# Patient Record
Sex: MALE | Race: WHITE | NOT HISPANIC OR LATINO | Employment: OTHER | URBAN - METROPOLITAN AREA
[De-identification: names, ages, dates, MRNs, and addresses within clinical notes are randomized per-mention and may not be internally consistent; named-entity substitution may affect disease eponyms.]

---

## 2017-04-25 ENCOUNTER — COMMUNICATION - HEALTHEAST (OUTPATIENT)
Dept: FAMILY MEDICINE | Facility: CLINIC | Age: 82
End: 2017-04-25

## 2017-05-10 ENCOUNTER — HOSPITAL ENCOUNTER (OUTPATIENT)
Dept: NEUROLOGY | Facility: CLINIC | Age: 82
Setting detail: THERAPIES SERIES
Discharge: STILL ADMITTED OR ADMINISTRATIVE | End: 2017-05-10
Attending: NURSE PRACTITIONER

## 2017-05-10 DIAGNOSIS — R06.02 SHORTNESS OF BREATH: ICD-10-CM

## 2017-05-11 ENCOUNTER — COMMUNICATION - HEALTHEAST (OUTPATIENT)
Dept: NEUROSURGERY | Facility: CLINIC | Age: 82
End: 2017-05-11

## 2017-05-11 ENCOUNTER — RECORDS - HEALTHEAST (OUTPATIENT)
Dept: ADMINISTRATIVE | Facility: OTHER | Age: 82
End: 2017-05-11

## 2017-05-18 ENCOUNTER — HOSPITAL ENCOUNTER (OUTPATIENT)
Dept: NEUROLOGY | Facility: CLINIC | Age: 82
Setting detail: THERAPIES SERIES
Discharge: STILL ADMITTED OR ADMINISTRATIVE | End: 2017-05-18
Attending: NURSE PRACTITIONER

## 2017-05-18 ENCOUNTER — HOSPITAL ENCOUNTER (OUTPATIENT)
Dept: CT IMAGING | Facility: CLINIC | Age: 82
Discharge: HOME | End: 2017-05-18
Attending: NURSE PRACTITIONER

## 2017-05-18 DIAGNOSIS — R29.818 PARKINSONIAN FEATURES: ICD-10-CM

## 2017-05-18 DIAGNOSIS — Z91.09 ENVIRONMENTAL ALLERGIES: ICD-10-CM

## 2017-05-18 DIAGNOSIS — E08.3213: ICD-10-CM

## 2017-05-18 DIAGNOSIS — F09 MILD COGNITIVE DISORDER: ICD-10-CM

## 2017-05-18 DIAGNOSIS — E55.9 VITAMIN D DEFICIENCY: ICD-10-CM

## 2017-05-20 ENCOUNTER — COMMUNICATION - HEALTHEAST (OUTPATIENT)
Dept: FAMILY MEDICINE | Facility: CLINIC | Age: 82
End: 2017-05-20

## 2017-05-30 ENCOUNTER — AMBULATORY - HEALTHEAST (OUTPATIENT)
Dept: NEUROLOGY | Facility: CLINIC | Age: 82
End: 2017-05-30

## 2017-05-30 DIAGNOSIS — R29.818 PARKINSONIAN FEATURES: ICD-10-CM

## 2017-06-05 ENCOUNTER — HOSPITAL ENCOUNTER (OUTPATIENT)
Dept: NEUROLOGY | Facility: CLINIC | Age: 82
Setting detail: THERAPIES SERIES
Discharge: STILL ADMITTED OR ADMINISTRATIVE | End: 2017-06-05
Attending: NURSE PRACTITIONER

## 2017-06-05 DIAGNOSIS — R29.818 PARKINSONIAN FEATURES: ICD-10-CM

## 2017-06-16 ENCOUNTER — OFFICE VISIT - HEALTHEAST (OUTPATIENT)
Dept: FAMILY MEDICINE | Facility: CLINIC | Age: 82
End: 2017-06-16

## 2017-06-16 DIAGNOSIS — R29.818 PARKINSONIAN FEATURES: ICD-10-CM

## 2017-06-16 DIAGNOSIS — Z01.818 PREOP EXAMINATION: ICD-10-CM

## 2017-06-16 DIAGNOSIS — G31.83: ICD-10-CM

## 2017-06-16 DIAGNOSIS — F02.80: ICD-10-CM

## 2017-06-16 DIAGNOSIS — H26.9 CATARACT: ICD-10-CM

## 2017-06-16 ASSESSMENT — MIFFLIN-ST. JEOR: SCORE: 1587.05

## 2017-06-21 ENCOUNTER — HOSPITAL ENCOUNTER (OUTPATIENT)
Dept: NEUROLOGY | Facility: CLINIC | Age: 82
Setting detail: THERAPIES SERIES
Discharge: STILL ADMITTED OR ADMINISTRATIVE | End: 2017-06-21
Attending: NURSE PRACTITIONER

## 2017-06-21 DIAGNOSIS — M51.379 DEGENERATION OF LUMBAR OR LUMBOSACRAL INTERVERTEBRAL DISC: ICD-10-CM

## 2017-06-21 DIAGNOSIS — M54.30 SCIATICA, UNSPECIFIED LATERALITY: ICD-10-CM

## 2017-06-21 DIAGNOSIS — F06.4 ANXIETY DISORDER DUE TO MEDICAL CONDITION: ICD-10-CM

## 2017-06-21 DIAGNOSIS — G89.29 OTHER CHRONIC PAIN: ICD-10-CM

## 2017-06-21 DIAGNOSIS — R29.818 PARKINSONIAN FEATURES: ICD-10-CM

## 2017-06-26 ENCOUNTER — HOSPITAL ENCOUNTER (OUTPATIENT)
Dept: PHYSICAL THERAPY | Age: 82
Setting detail: THERAPIES SERIES
Discharge: STILL ADMITTED OR ADMINISTRATIVE | End: 2017-06-26
Attending: NURSE PRACTITIONER

## 2017-06-26 DIAGNOSIS — M54.30 SCIATICA, UNSPECIFIED LATERALITY: ICD-10-CM

## 2017-06-26 DIAGNOSIS — G89.29 CHRONIC BILATERAL LOW BACK PAIN WITH RIGHT-SIDED SCIATICA: ICD-10-CM

## 2017-06-26 DIAGNOSIS — M51.379 DEGENERATION OF LUMBAR OR LUMBOSACRAL INTERVERTEBRAL DISC: ICD-10-CM

## 2017-06-26 DIAGNOSIS — M54.41 CHRONIC BILATERAL LOW BACK PAIN WITH RIGHT-SIDED SCIATICA: ICD-10-CM

## 2017-06-26 DIAGNOSIS — G89.29 OTHER CHRONIC PAIN: ICD-10-CM

## 2017-07-19 ENCOUNTER — HOSPITAL ENCOUNTER (OUTPATIENT)
Dept: NEUROLOGY | Facility: CLINIC | Age: 82
Setting detail: THERAPIES SERIES
Discharge: STILL ADMITTED OR ADMINISTRATIVE | End: 2017-07-19
Attending: NURSE PRACTITIONER

## 2017-07-19 ENCOUNTER — HOSPITAL ENCOUNTER (OUTPATIENT)
Dept: PHYSICAL THERAPY | Age: 82
Setting detail: THERAPIES SERIES
Discharge: STILL ADMITTED OR ADMINISTRATIVE | End: 2017-07-19
Attending: NURSE PRACTITIONER

## 2017-07-19 DIAGNOSIS — M54.30 SCIATICA, UNSPECIFIED LATERALITY: ICD-10-CM

## 2017-07-19 DIAGNOSIS — G89.29 CHRONIC BILATERAL LOW BACK PAIN WITH RIGHT-SIDED SCIATICA: ICD-10-CM

## 2017-07-19 DIAGNOSIS — R29.818 PARKINSONIAN FEATURES: ICD-10-CM

## 2017-07-19 DIAGNOSIS — M54.41 CHRONIC BILATERAL LOW BACK PAIN WITH RIGHT-SIDED SCIATICA: ICD-10-CM

## 2017-07-19 DIAGNOSIS — M54.9 CHRONIC BACK PAIN GREATER THAN 3 MONTHS DURATION: ICD-10-CM

## 2017-07-19 DIAGNOSIS — G89.29 CHRONIC BACK PAIN GREATER THAN 3 MONTHS DURATION: ICD-10-CM

## 2017-07-19 DIAGNOSIS — G31.84 MILD COGNITIVE IMPAIRMENT: ICD-10-CM

## 2017-07-21 ENCOUNTER — HOSPITAL ENCOUNTER (OUTPATIENT)
Dept: PHYSICAL THERAPY | Age: 82
Setting detail: THERAPIES SERIES
Discharge: STILL ADMITTED OR ADMINISTRATIVE | End: 2017-07-21
Attending: NURSE PRACTITIONER

## 2017-07-21 DIAGNOSIS — G89.29 CHRONIC BILATERAL LOW BACK PAIN WITH RIGHT-SIDED SCIATICA: ICD-10-CM

## 2017-07-21 DIAGNOSIS — M54.41 CHRONIC BILATERAL LOW BACK PAIN WITH RIGHT-SIDED SCIATICA: ICD-10-CM

## 2017-07-26 ENCOUNTER — HOSPITAL ENCOUNTER (OUTPATIENT)
Dept: PHYSICAL THERAPY | Age: 82
Setting detail: THERAPIES SERIES
Discharge: STILL ADMITTED OR ADMINISTRATIVE | End: 2017-07-26
Attending: NURSE PRACTITIONER

## 2017-07-26 DIAGNOSIS — M54.41 CHRONIC BILATERAL LOW BACK PAIN WITH RIGHT-SIDED SCIATICA: ICD-10-CM

## 2017-07-26 DIAGNOSIS — G89.29 CHRONIC BILATERAL LOW BACK PAIN WITH RIGHT-SIDED SCIATICA: ICD-10-CM

## 2017-07-28 ENCOUNTER — HOSPITAL ENCOUNTER (OUTPATIENT)
Dept: PHYSICAL THERAPY | Age: 82
Setting detail: THERAPIES SERIES
Discharge: STILL ADMITTED OR ADMINISTRATIVE | End: 2017-07-28
Attending: NURSE PRACTITIONER

## 2017-07-28 DIAGNOSIS — G89.29 CHRONIC BILATERAL LOW BACK PAIN WITH RIGHT-SIDED SCIATICA: ICD-10-CM

## 2017-07-28 DIAGNOSIS — M54.41 CHRONIC BILATERAL LOW BACK PAIN WITH RIGHT-SIDED SCIATICA: ICD-10-CM

## 2017-08-02 ENCOUNTER — HOSPITAL ENCOUNTER (OUTPATIENT)
Dept: PHYSICAL THERAPY | Age: 82
Setting detail: THERAPIES SERIES
Discharge: STILL ADMITTED OR ADMINISTRATIVE | End: 2017-08-02
Attending: NURSE PRACTITIONER

## 2017-08-02 DIAGNOSIS — M54.41 CHRONIC BILATERAL LOW BACK PAIN WITH RIGHT-SIDED SCIATICA: ICD-10-CM

## 2017-08-02 DIAGNOSIS — G89.29 CHRONIC BILATERAL LOW BACK PAIN WITH RIGHT-SIDED SCIATICA: ICD-10-CM

## 2017-08-04 ENCOUNTER — HOSPITAL ENCOUNTER (OUTPATIENT)
Dept: PHYSICAL THERAPY | Age: 82
Setting detail: THERAPIES SERIES
Discharge: STILL ADMITTED OR ADMINISTRATIVE | End: 2017-08-04
Attending: NURSE PRACTITIONER

## 2017-08-04 DIAGNOSIS — M54.41 CHRONIC BILATERAL LOW BACK PAIN WITH RIGHT-SIDED SCIATICA: ICD-10-CM

## 2017-08-04 DIAGNOSIS — G89.29 CHRONIC BILATERAL LOW BACK PAIN WITH RIGHT-SIDED SCIATICA: ICD-10-CM

## 2017-08-08 ENCOUNTER — COMMUNICATION - HEALTHEAST (OUTPATIENT)
Dept: SCHEDULING | Facility: CLINIC | Age: 82
End: 2017-08-08

## 2017-08-09 ENCOUNTER — HOSPITAL ENCOUNTER (OUTPATIENT)
Dept: PHYSICAL THERAPY | Age: 82
Setting detail: THERAPIES SERIES
Discharge: STILL ADMITTED OR ADMINISTRATIVE | End: 2017-08-09
Attending: NURSE PRACTITIONER

## 2017-08-09 DIAGNOSIS — G89.29 CHRONIC BILATERAL LOW BACK PAIN WITH RIGHT-SIDED SCIATICA: ICD-10-CM

## 2017-08-09 DIAGNOSIS — M54.41 CHRONIC BILATERAL LOW BACK PAIN WITH RIGHT-SIDED SCIATICA: ICD-10-CM

## 2017-08-16 ENCOUNTER — HOSPITAL ENCOUNTER (OUTPATIENT)
Dept: PHYSICAL THERAPY | Age: 82
Setting detail: THERAPIES SERIES
Discharge: STILL ADMITTED OR ADMINISTRATIVE | End: 2017-08-16
Attending: NURSE PRACTITIONER

## 2017-08-16 DIAGNOSIS — G89.29 CHRONIC BILATERAL LOW BACK PAIN WITH RIGHT-SIDED SCIATICA: ICD-10-CM

## 2017-08-16 DIAGNOSIS — M54.41 CHRONIC BILATERAL LOW BACK PAIN WITH RIGHT-SIDED SCIATICA: ICD-10-CM

## 2017-08-18 ENCOUNTER — HOSPITAL ENCOUNTER (OUTPATIENT)
Dept: PHYSICAL THERAPY | Age: 82
Setting detail: THERAPIES SERIES
Discharge: STILL ADMITTED OR ADMINISTRATIVE | End: 2017-08-18
Attending: NURSE PRACTITIONER

## 2017-08-18 ENCOUNTER — HOSPITAL ENCOUNTER (OUTPATIENT)
Dept: NEUROLOGY | Facility: CLINIC | Age: 82
Setting detail: THERAPIES SERIES
Discharge: STILL ADMITTED OR ADMINISTRATIVE | End: 2017-08-18
Attending: NURSE PRACTITIONER

## 2017-08-18 DIAGNOSIS — R29.818 PARKINSONIAN FEATURES: ICD-10-CM

## 2017-08-18 DIAGNOSIS — M54.41 CHRONIC BILATERAL LOW BACK PAIN WITH RIGHT-SIDED SCIATICA: ICD-10-CM

## 2017-08-18 DIAGNOSIS — G89.29 CHRONIC BILATERAL LOW BACK PAIN WITH RIGHT-SIDED SCIATICA: ICD-10-CM

## 2017-08-18 DIAGNOSIS — F43.23 ADJUSTMENT DISORDER WITH MIXED ANXIETY AND DEPRESSED MOOD: ICD-10-CM

## 2017-08-18 DIAGNOSIS — R25.2 CRAMPS OF LOWER EXTREMITY, UNSPECIFIED LATERALITY: ICD-10-CM

## 2017-08-21 ENCOUNTER — OFFICE VISIT - HEALTHEAST (OUTPATIENT)
Dept: FAMILY MEDICINE | Facility: CLINIC | Age: 82
End: 2017-08-21

## 2017-08-21 ENCOUNTER — RECORDS - HEALTHEAST (OUTPATIENT)
Dept: GENERAL RADIOLOGY | Facility: CLINIC | Age: 82
End: 2017-08-21

## 2017-08-21 DIAGNOSIS — R04.0 EPISTAXIS: ICD-10-CM

## 2017-08-21 DIAGNOSIS — S02.2XXA CLOSED FRACTURE OF NASAL BONE, INITIAL ENCOUNTER: ICD-10-CM

## 2017-08-21 DIAGNOSIS — S09.92XA NOSE INJURY, INITIAL ENCOUNTER: ICD-10-CM

## 2017-08-21 DIAGNOSIS — S09.92XA UNSPECIFIED INJURY OF NOSE, INITIAL ENCOUNTER: ICD-10-CM

## 2017-08-21 ASSESSMENT — MIFFLIN-ST. JEOR: SCORE: 1584.33

## 2017-09-05 ENCOUNTER — COMMUNICATION - HEALTHEAST (OUTPATIENT)
Dept: NEUROLOGY | Facility: CLINIC | Age: 82
End: 2017-09-05

## 2017-09-05 DIAGNOSIS — Z91.09 ENVIRONMENTAL ALLERGIES: ICD-10-CM

## 2017-09-21 ENCOUNTER — RECORDS - HEALTHEAST (OUTPATIENT)
Dept: ADMINISTRATIVE | Facility: OTHER | Age: 82
End: 2017-09-21

## 2017-09-21 ENCOUNTER — HOSPITAL ENCOUNTER (OUTPATIENT)
Dept: NEUROLOGY | Facility: CLINIC | Age: 82
Setting detail: THERAPIES SERIES
Discharge: STILL ADMITTED OR ADMINISTRATIVE | End: 2017-09-21
Attending: NURSE PRACTITIONER

## 2017-09-21 DIAGNOSIS — R40.20 LOC (LOSS OF CONSCIOUSNESS) (H): ICD-10-CM

## 2017-09-21 DIAGNOSIS — G47.52 REM SLEEP BEHAVIOR DISORDER: ICD-10-CM

## 2017-09-21 DIAGNOSIS — R29.818 PARKINSONIAN FEATURES: ICD-10-CM

## 2017-09-21 DIAGNOSIS — R44.3 HALLUCINATIONS: ICD-10-CM

## 2017-09-27 LAB
ATRIAL RATE - MUSE: 68 BPM
DIASTOLIC BLOOD PRESSURE - MUSE: NORMAL MMHG
INTERPRETATION ECG - MUSE: NORMAL
P AXIS - MUSE: 61 DEGREES
PR INTERVAL - MUSE: 164 MS
QRS DURATION - MUSE: 78 MS
QT - MUSE: 404 MS
QTC - MUSE: 429 MS
R AXIS - MUSE: -40 DEGREES
SYSTOLIC BLOOD PRESSURE - MUSE: NORMAL MMHG
T AXIS - MUSE: 18 DEGREES
VENTRICULAR RATE- MUSE: 68 BPM

## 2017-10-20 ENCOUNTER — COMMUNICATION - HEALTHEAST (OUTPATIENT)
Dept: CARDIOLOGY | Facility: CLINIC | Age: 82
End: 2017-10-20

## 2017-10-20 ENCOUNTER — COMMUNICATION - HEALTHEAST (OUTPATIENT)
Dept: NEUROLOGY | Facility: CLINIC | Age: 82
End: 2017-10-20

## 2017-10-20 DIAGNOSIS — I25.10 CAD (CORONARY ARTERY DISEASE): ICD-10-CM

## 2017-10-20 DIAGNOSIS — F32.A DEPRESSION: ICD-10-CM

## 2017-10-20 DIAGNOSIS — G89.29 CHRONIC PAIN: ICD-10-CM

## 2017-10-25 ENCOUNTER — OFFICE VISIT - HEALTHEAST (OUTPATIENT)
Dept: FAMILY MEDICINE | Facility: CLINIC | Age: 82
End: 2017-10-25

## 2017-10-25 DIAGNOSIS — R42 VERTIGO: ICD-10-CM

## 2017-10-25 DIAGNOSIS — J06.9 UPPER RESPIRATORY TRACT INFECTION, UNSPECIFIED TYPE: ICD-10-CM

## 2017-11-29 ENCOUNTER — HOSPITAL ENCOUNTER (OUTPATIENT)
Dept: NEUROLOGY | Facility: CLINIC | Age: 82
Setting detail: THERAPIES SERIES
Discharge: STILL ADMITTED OR ADMINISTRATIVE | End: 2017-11-29
Attending: NURSE PRACTITIONER

## 2017-11-29 DIAGNOSIS — F09 MILD COGNITIVE DISORDER: ICD-10-CM

## 2017-11-29 DIAGNOSIS — G20.A1 MILD NEUROCOGNITIVE DISORDER DUE TO PARKINSON'S DISEASE (H): ICD-10-CM

## 2017-11-29 DIAGNOSIS — R29.818 PARKINSONIAN FEATURES: ICD-10-CM

## 2017-11-29 DIAGNOSIS — F06.70 MILD NEUROCOGNITIVE DISORDER DUE TO PARKINSON'S DISEASE (H): ICD-10-CM

## 2017-12-12 ENCOUNTER — HOSPITAL ENCOUNTER (OUTPATIENT)
Dept: NEUROLOGY | Facility: CLINIC | Age: 82
Setting detail: THERAPIES SERIES
Discharge: STILL ADMITTED OR ADMINISTRATIVE | End: 2017-12-12
Attending: NURSE PRACTITIONER

## 2017-12-12 DIAGNOSIS — G20.A1 MILD NEUROCOGNITIVE DISORDER DUE TO PARKINSON'S DISEASE (H): ICD-10-CM

## 2017-12-12 DIAGNOSIS — F06.70 MILD NEUROCOGNITIVE DISORDER DUE TO PARKINSON'S DISEASE (H): ICD-10-CM

## 2017-12-26 ENCOUNTER — COMMUNICATION - HEALTHEAST (OUTPATIENT)
Dept: NEUROLOGY | Facility: CLINIC | Age: 82
End: 2017-12-26

## 2017-12-26 DIAGNOSIS — R29.818 PARKINSONIAN FEATURES: ICD-10-CM

## 2018-01-05 ENCOUNTER — HOSPITAL ENCOUNTER (OUTPATIENT)
Dept: NEUROLOGY | Facility: CLINIC | Age: 83
Setting detail: THERAPIES SERIES
Discharge: STILL ADMITTED OR ADMINISTRATIVE | End: 2018-01-05
Attending: NURSE PRACTITIONER

## 2018-01-05 DIAGNOSIS — R55 PRE-SYNCOPE: ICD-10-CM

## 2018-01-05 DIAGNOSIS — R42 DIZZINESS: ICD-10-CM

## 2018-01-05 DIAGNOSIS — F43.23 ADJUSTMENT DISORDER WITH MIXED ANXIETY AND DEPRESSED MOOD: ICD-10-CM

## 2018-01-05 DIAGNOSIS — R06.02 SOB (SHORTNESS OF BREATH): ICD-10-CM

## 2018-01-05 DIAGNOSIS — K13.0 SORE OF LOWER LIP: ICD-10-CM

## 2018-01-05 DIAGNOSIS — R29.818 PARKINSONIAN FEATURES: ICD-10-CM

## 2018-01-05 LAB
ALBUMIN SERPL-MCNC: 4.1 G/DL (ref 3.5–5)
ALP SERPL-CCNC: 89 U/L (ref 45–120)
ALT SERPL W P-5'-P-CCNC: 17 U/L (ref 0–45)
ANION GAP SERPL CALCULATED.3IONS-SCNC: 9 MMOL/L (ref 5–18)
AST SERPL W P-5'-P-CCNC: 23 U/L (ref 0–40)
BASOPHILS # BLD AUTO: 0 THOU/UL (ref 0–0.2)
BASOPHILS NFR BLD AUTO: 0 % (ref 0–2)
BILIRUB SERPL-MCNC: 0.4 MG/DL (ref 0–1)
BUN SERPL-MCNC: 20 MG/DL (ref 8–28)
CALCIUM SERPL-MCNC: 10.5 MG/DL (ref 8.5–10.5)
CHLORIDE BLD-SCNC: 105 MMOL/L (ref 98–107)
CK SERPL-CCNC: 250 U/L (ref 30–190)
CO2 SERPL-SCNC: 26 MMOL/L (ref 22–31)
CREAT SERPL-MCNC: 0.9 MG/DL (ref 0.7–1.3)
EOSINOPHIL # BLD AUTO: 0.2 THOU/UL (ref 0–0.4)
EOSINOPHIL NFR BLD AUTO: 3 % (ref 0–6)
ERYTHROCYTE [DISTWIDTH] IN BLOOD BY AUTOMATED COUNT: 14.3 % (ref 11–14.5)
GFR SERPL CREATININE-BSD FRML MDRD: >60 ML/MIN/1.73M2
GLUCOSE BLD-MCNC: 97 MG/DL (ref 70–125)
HCT VFR BLD AUTO: 44.3 % (ref 40–54)
HGB BLD-MCNC: 14.6 G/DL (ref 14–18)
LYMPHOCYTES # BLD AUTO: 2.4 THOU/UL (ref 0.8–4.4)
LYMPHOCYTES NFR BLD AUTO: 35 % (ref 20–40)
MCH RBC QN AUTO: 28.3 PG (ref 27–34)
MCHC RBC AUTO-ENTMCNC: 33 G/DL (ref 32–36)
MCV RBC AUTO: 86 FL (ref 80–100)
MONOCYTES # BLD AUTO: 0.7 THOU/UL (ref 0–0.9)
MONOCYTES NFR BLD AUTO: 11 % (ref 2–10)
NEUTROPHILS # BLD AUTO: 3.4 THOU/UL (ref 2–7.7)
NEUTROPHILS NFR BLD AUTO: 52 % (ref 50–70)
PLATELET # BLD AUTO: 219 THOU/UL (ref 140–440)
PMV BLD AUTO: 10.4 FL (ref 8.5–12.5)
POTASSIUM BLD-SCNC: 4.6 MMOL/L (ref 3.5–5)
PROT SERPL-MCNC: 7.1 G/DL (ref 6–8)
RBC # BLD AUTO: 5.16 MILL/UL (ref 4.4–6.2)
SODIUM SERPL-SCNC: 140 MMOL/L (ref 136–145)
TROPONIN I SERPL-MCNC: 0.02 NG/ML (ref 0–0.29)
WBC: 6.7 THOU/UL (ref 4–11)

## 2018-01-08 ENCOUNTER — COMMUNICATION - HEALTHEAST (OUTPATIENT)
Dept: NEUROLOGY | Facility: CLINIC | Age: 83
End: 2018-01-08

## 2018-01-08 ENCOUNTER — HOSPITAL ENCOUNTER (OUTPATIENT)
Dept: PHYSICAL THERAPY | Age: 83
Setting detail: THERAPIES SERIES
Discharge: STILL ADMITTED OR ADMINISTRATIVE | End: 2018-01-08
Attending: NURSE PRACTITIONER

## 2018-01-08 DIAGNOSIS — R29.818 PARKINSONIAN FEATURES: ICD-10-CM

## 2018-01-08 DIAGNOSIS — R26.89 DECREASED FUNCTIONAL MOBILITY: ICD-10-CM

## 2018-01-08 DIAGNOSIS — G89.29 CHRONIC BILATERAL LOW BACK PAIN WITH RIGHT-SIDED SCIATICA: ICD-10-CM

## 2018-01-08 DIAGNOSIS — R26.89 UNSTABLE BALANCE: ICD-10-CM

## 2018-01-08 DIAGNOSIS — R42 DIZZINESS: ICD-10-CM

## 2018-01-08 DIAGNOSIS — M54.41 CHRONIC BILATERAL LOW BACK PAIN WITH RIGHT-SIDED SCIATICA: ICD-10-CM

## 2018-01-08 DIAGNOSIS — R26.81 GAIT INSTABILITY: ICD-10-CM

## 2018-01-09 LAB
ATRIAL RATE - MUSE: 65 BPM
DIASTOLIC BLOOD PRESSURE - MUSE: NORMAL MMHG
INTERPRETATION ECG - MUSE: NORMAL
P AXIS - MUSE: 57 DEGREES
PR INTERVAL - MUSE: 170 MS
QRS DURATION - MUSE: 78 MS
QT - MUSE: 400 MS
QTC - MUSE: 416 MS
R AXIS - MUSE: -43 DEGREES
SYSTOLIC BLOOD PRESSURE - MUSE: NORMAL MMHG
T AXIS - MUSE: 17 DEGREES
VENTRICULAR RATE- MUSE: 65 BPM

## 2018-01-12 ENCOUNTER — OFFICE VISIT - HEALTHEAST (OUTPATIENT)
Dept: FAMILY MEDICINE | Facility: CLINIC | Age: 83
End: 2018-01-12

## 2018-01-12 DIAGNOSIS — C00.9 CANCER OF LIP: ICD-10-CM

## 2018-01-17 ENCOUNTER — RECORDS - HEALTHEAST (OUTPATIENT)
Dept: ADMINISTRATIVE | Facility: OTHER | Age: 83
End: 2018-01-17

## 2018-01-17 ENCOUNTER — COMMUNICATION - HEALTHEAST (OUTPATIENT)
Dept: FAMILY MEDICINE | Facility: CLINIC | Age: 83
End: 2018-01-17

## 2018-01-19 ENCOUNTER — HOSPITAL ENCOUNTER (OUTPATIENT)
Dept: PHYSICAL THERAPY | Age: 83
Setting detail: THERAPIES SERIES
Discharge: STILL ADMITTED OR ADMINISTRATIVE | End: 2018-01-19
Attending: NURSE PRACTITIONER

## 2018-01-19 DIAGNOSIS — R42 DIZZINESS: ICD-10-CM

## 2018-01-19 DIAGNOSIS — M54.41 CHRONIC BILATERAL LOW BACK PAIN WITH RIGHT-SIDED SCIATICA: ICD-10-CM

## 2018-01-19 DIAGNOSIS — G89.29 CHRONIC BILATERAL LOW BACK PAIN WITH RIGHT-SIDED SCIATICA: ICD-10-CM

## 2018-01-19 DIAGNOSIS — R26.89 UNSTABLE BALANCE: ICD-10-CM

## 2018-01-19 DIAGNOSIS — R26.81 GAIT INSTABILITY: ICD-10-CM

## 2018-01-19 DIAGNOSIS — R26.89 DECREASED FUNCTIONAL MOBILITY: ICD-10-CM

## 2018-01-22 ENCOUNTER — HOSPITAL ENCOUNTER (OUTPATIENT)
Dept: PHYSICAL THERAPY | Age: 83
Setting detail: THERAPIES SERIES
Discharge: STILL ADMITTED OR ADMINISTRATIVE | End: 2018-01-22
Attending: NURSE PRACTITIONER

## 2018-01-22 DIAGNOSIS — R26.89 UNSTABLE BALANCE: ICD-10-CM

## 2018-01-22 DIAGNOSIS — R42 DIZZINESS: ICD-10-CM

## 2018-01-29 ENCOUNTER — HOSPITAL ENCOUNTER (OUTPATIENT)
Dept: PHYSICAL THERAPY | Age: 83
Setting detail: THERAPIES SERIES
Discharge: STILL ADMITTED OR ADMINISTRATIVE | End: 2018-01-29
Attending: NURSE PRACTITIONER

## 2018-01-29 DIAGNOSIS — R26.89 UNSTABLE BALANCE: ICD-10-CM

## 2018-01-29 DIAGNOSIS — R42 DIZZINESS: ICD-10-CM

## 2018-01-29 DIAGNOSIS — R26.89 DECREASED FUNCTIONAL MOBILITY: ICD-10-CM

## 2018-01-29 DIAGNOSIS — R26.81 GAIT INSTABILITY: ICD-10-CM

## 2018-02-01 ENCOUNTER — HOSPITAL ENCOUNTER (OUTPATIENT)
Dept: PHYSICAL THERAPY | Age: 83
Setting detail: THERAPIES SERIES
Discharge: STILL ADMITTED OR ADMINISTRATIVE | End: 2018-02-01
Attending: NURSE PRACTITIONER

## 2018-02-01 DIAGNOSIS — R26.89 UNSTABLE BALANCE: ICD-10-CM

## 2018-02-01 DIAGNOSIS — R42 DIZZINESS: ICD-10-CM

## 2018-02-05 ENCOUNTER — HOSPITAL ENCOUNTER (OUTPATIENT)
Dept: PHYSICAL THERAPY | Age: 83
Setting detail: THERAPIES SERIES
Discharge: STILL ADMITTED OR ADMINISTRATIVE | End: 2018-02-05
Attending: NURSE PRACTITIONER

## 2018-02-05 DIAGNOSIS — R42 DIZZINESS: ICD-10-CM

## 2018-02-05 DIAGNOSIS — R26.89 UNSTABLE BALANCE: ICD-10-CM

## 2018-02-05 DIAGNOSIS — G89.29 OTHER CHRONIC PAIN: ICD-10-CM

## 2018-02-05 DIAGNOSIS — R26.81 GAIT INSTABILITY: ICD-10-CM

## 2018-02-05 DIAGNOSIS — R26.89 DECREASED FUNCTIONAL MOBILITY: ICD-10-CM

## 2018-02-14 ENCOUNTER — OFFICE VISIT - HEALTHEAST (OUTPATIENT)
Dept: OTOLARYNGOLOGY | Facility: CLINIC | Age: 83
End: 2018-02-14

## 2018-02-14 DIAGNOSIS — J34.89 NASAL DRAINAGE: ICD-10-CM

## 2018-02-19 ENCOUNTER — HOSPITAL ENCOUNTER (OUTPATIENT)
Dept: PHYSICAL THERAPY | Age: 83
Setting detail: THERAPIES SERIES
Discharge: STILL ADMITTED OR ADMINISTRATIVE | End: 2018-02-19
Attending: NURSE PRACTITIONER

## 2018-02-19 DIAGNOSIS — R26.89 UNSTABLE BALANCE: ICD-10-CM

## 2018-02-19 DIAGNOSIS — R42 DIZZINESS: ICD-10-CM

## 2018-02-21 ENCOUNTER — HOSPITAL ENCOUNTER (OUTPATIENT)
Dept: PHYSICAL THERAPY | Age: 83
Setting detail: THERAPIES SERIES
Discharge: STILL ADMITTED OR ADMINISTRATIVE | End: 2018-02-21
Attending: NURSE PRACTITIONER

## 2018-02-21 ENCOUNTER — COMMUNICATION - HEALTHEAST (OUTPATIENT)
Dept: FAMILY MEDICINE | Facility: CLINIC | Age: 83
End: 2018-02-21

## 2018-02-21 DIAGNOSIS — Z01.818 PREOP EXAMINATION: ICD-10-CM

## 2018-02-21 DIAGNOSIS — R26.89 UNSTABLE BALANCE: ICD-10-CM

## 2018-02-26 ENCOUNTER — HOSPITAL ENCOUNTER (OUTPATIENT)
Dept: PHYSICAL THERAPY | Age: 83
Setting detail: THERAPIES SERIES
Discharge: STILL ADMITTED OR ADMINISTRATIVE | End: 2018-02-26
Attending: NURSE PRACTITIONER

## 2018-02-26 DIAGNOSIS — R26.89 UNSTABLE BALANCE: ICD-10-CM

## 2018-03-14 ENCOUNTER — HOSPITAL ENCOUNTER (OUTPATIENT)
Dept: PHYSICAL THERAPY | Age: 83
Setting detail: THERAPIES SERIES
Discharge: STILL ADMITTED OR ADMINISTRATIVE | End: 2018-03-14
Attending: NURSE PRACTITIONER

## 2018-03-14 DIAGNOSIS — R26.89 UNSTABLE BALANCE: ICD-10-CM

## 2018-03-14 DIAGNOSIS — R26.89 DECREASED FUNCTIONAL MOBILITY: ICD-10-CM

## 2018-03-14 DIAGNOSIS — R42 DIZZINESS: ICD-10-CM

## 2018-03-14 DIAGNOSIS — R26.81 GAIT INSTABILITY: ICD-10-CM

## 2018-03-21 ENCOUNTER — RECORDS - HEALTHEAST (OUTPATIENT)
Dept: ADMINISTRATIVE | Facility: OTHER | Age: 83
End: 2018-03-21

## 2018-03-23 ENCOUNTER — HOSPITAL ENCOUNTER (OUTPATIENT)
Dept: PHYSICAL THERAPY | Age: 83
Setting detail: THERAPIES SERIES
Discharge: STILL ADMITTED OR ADMINISTRATIVE | End: 2018-03-23
Attending: NURSE PRACTITIONER

## 2018-03-23 DIAGNOSIS — R26.89 UNSTABLE BALANCE: ICD-10-CM

## 2018-03-23 DIAGNOSIS — R42 DIZZINESS: ICD-10-CM

## 2018-05-22 ENCOUNTER — OFFICE VISIT - HEALTHEAST (OUTPATIENT)
Dept: CARDIOLOGY | Facility: CLINIC | Age: 83
End: 2018-05-22

## 2018-05-22 DIAGNOSIS — I10 ESSENTIAL HYPERTENSION: ICD-10-CM

## 2018-05-22 DIAGNOSIS — E78.5 DYSLIPIDEMIA: ICD-10-CM

## 2018-05-22 DIAGNOSIS — I25.10 CORONARY ARTERY DISEASE INVOLVING NATIVE CORONARY ARTERY OF NATIVE HEART WITHOUT ANGINA PECTORIS: ICD-10-CM

## 2018-05-22 ASSESSMENT — MIFFLIN-ST. JEOR: SCORE: 1564.37

## 2018-07-03 ENCOUNTER — COMMUNICATION - HEALTHEAST (OUTPATIENT)
Dept: FAMILY MEDICINE | Facility: CLINIC | Age: 83
End: 2018-07-03

## 2018-07-03 DIAGNOSIS — Z01.818 PREOP EXAMINATION: ICD-10-CM

## 2018-07-06 ENCOUNTER — COMMUNICATION - HEALTHEAST (OUTPATIENT)
Dept: FAMILY MEDICINE | Facility: CLINIC | Age: 83
End: 2018-07-06

## 2018-07-06 DIAGNOSIS — Z01.818 PREOP EXAMINATION: ICD-10-CM

## 2018-08-09 ENCOUNTER — RECORDS - HEALTHEAST (OUTPATIENT)
Dept: ADMINISTRATIVE | Facility: OTHER | Age: 83
End: 2018-08-09

## 2018-09-28 ENCOUNTER — OFFICE VISIT - HEALTHEAST (OUTPATIENT)
Dept: FAMILY MEDICINE | Facility: CLINIC | Age: 83
End: 2018-09-28

## 2018-09-28 DIAGNOSIS — H61.93 LESION OF BOTH EXTERNAL EARS: ICD-10-CM

## 2019-04-17 ENCOUNTER — COMMUNICATION - HEALTHEAST (OUTPATIENT)
Dept: ADMINISTRATIVE | Facility: CLINIC | Age: 84
End: 2019-04-17

## 2019-06-11 ENCOUNTER — OFFICE VISIT - HEALTHEAST (OUTPATIENT)
Dept: CARDIOLOGY | Facility: CLINIC | Age: 84
End: 2019-06-11

## 2019-06-11 DIAGNOSIS — E78.5 DYSLIPIDEMIA: ICD-10-CM

## 2019-06-11 DIAGNOSIS — I10 ESSENTIAL HYPERTENSION: ICD-10-CM

## 2019-06-11 DIAGNOSIS — I25.10 CORONARY ARTERY DISEASE INVOLVING NATIVE CORONARY ARTERY OF NATIVE HEART WITHOUT ANGINA PECTORIS: ICD-10-CM

## 2019-06-11 ASSESSMENT — MIFFLIN-ST. JEOR: SCORE: 1582.52

## 2019-08-15 ENCOUNTER — COMMUNICATION - HEALTHEAST (OUTPATIENT)
Dept: CARDIOLOGY | Facility: CLINIC | Age: 84
End: 2019-08-15

## 2019-08-15 DIAGNOSIS — I25.10 CAD (CORONARY ARTERY DISEASE): ICD-10-CM

## 2019-08-22 ENCOUNTER — HOSPITAL ENCOUNTER (OUTPATIENT)
Dept: CARDIOLOGY | Facility: HOSPITAL | Age: 84
Discharge: HOME | End: 2019-08-22
Attending: INTERNAL MEDICINE

## 2019-08-22 ENCOUNTER — HOSPITAL ENCOUNTER (OUTPATIENT)
Dept: NUCLEAR MEDICINE | Facility: HOSPITAL | Age: 84
Discharge: HOME | End: 2019-08-22
Attending: INTERNAL MEDICINE

## 2019-08-22 DIAGNOSIS — I25.10 CAD (CORONARY ARTERY DISEASE): ICD-10-CM

## 2019-08-22 LAB
CV STRESS CURRENT BP HE: NORMAL
CV STRESS CURRENT HR HE: 58
CV STRESS CURRENT HR HE: 59
CV STRESS CURRENT HR HE: 60
CV STRESS CURRENT HR HE: 61
CV STRESS CURRENT HR HE: 61
CV STRESS CURRENT HR HE: 62
CV STRESS CURRENT HR HE: 72
CV STRESS CURRENT HR HE: 75
CV STRESS CURRENT HR HE: 76
CV STRESS CURRENT HR HE: 77
CV STRESS CURRENT HR HE: 78
CV STRESS CURRENT HR HE: 78
CV STRESS CURRENT HR HE: 79
CV STRESS CURRENT HR HE: 80
CV STRESS CURRENT HR HE: 80
CV STRESS CURRENT HR HE: 81
CV STRESS CURRENT HR HE: 82
CV STRESS DEVIATION TIME HE: NORMAL
CV STRESS ECHO PERCENT HR HE: NORMAL
CV STRESS EXERCISE STAGE HE: NORMAL
CV STRESS FINAL RESTING BP HE: NORMAL
CV STRESS FINAL RESTING HR HE: 72
CV STRESS MAX HR HE: 84
CV STRESS MAX TREADMILL GRADE HE: 0
CV STRESS MAX TREADMILL SPEED HE: 0
CV STRESS PEAK DIA BP HE: NORMAL
CV STRESS PEAK SYS BP HE: NORMAL
CV STRESS PHASE HE: NORMAL
CV STRESS PROTOCOL HE: NORMAL
CV STRESS RESTING PT POSITION HE: NORMAL
CV STRESS RESTING PT POSITION HE: NORMAL
CV STRESS ST DEVIATION AMOUNT HE: NORMAL
CV STRESS ST DEVIATION ELEVATION HE: NORMAL
CV STRESS ST EVELATION AMOUNT HE: NORMAL
CV STRESS TEST TYPE HE: NORMAL
CV STRESS TOTAL STAGE TIME MIN 1 HE: NORMAL
NUC STRESS EJECTION FRACTION: 69 %
STRESS ECHO BASELINE BP: NORMAL
STRESS ECHO BASELINE HR: 62
STRESS ECHO CALCULATED PERCENT HR: 64 %
STRESS ECHO LAST STRESS BP: NORMAL
STRESS ECHO LAST STRESS HR: 80

## 2020-07-06 ENCOUNTER — AMBULATORY - HEALTHEAST (OUTPATIENT)
Dept: CARDIOLOGY | Facility: CLINIC | Age: 85
End: 2020-07-06

## 2020-07-06 ENCOUNTER — RECORDS - HEALTHEAST (OUTPATIENT)
Dept: ADMINISTRATIVE | Facility: OTHER | Age: 85
End: 2020-07-06

## 2020-07-07 ENCOUNTER — OFFICE VISIT - HEALTHEAST (OUTPATIENT)
Dept: CARDIOLOGY | Facility: CLINIC | Age: 85
End: 2020-07-07

## 2020-07-07 DIAGNOSIS — E78.5 DYSLIPIDEMIA: ICD-10-CM

## 2020-07-07 DIAGNOSIS — I10 HYPERTENSION, UNSPECIFIED TYPE: ICD-10-CM

## 2020-07-07 DIAGNOSIS — I25.10 CORONARY ARTERY DISEASE INVOLVING NATIVE CORONARY ARTERY OF NATIVE HEART WITHOUT ANGINA PECTORIS: ICD-10-CM

## 2020-07-22 ENCOUNTER — OFFICE VISIT (OUTPATIENT)
Dept: FAMILY MEDICINE | Facility: CLINIC | Age: 85
End: 2020-07-22
Payer: MEDICARE

## 2020-07-22 VITALS
HEART RATE: 69 BPM | TEMPERATURE: 98.8 F | WEIGHT: 200.6 LBS | OXYGEN SATURATION: 97 % | HEIGHT: 70 IN | DIASTOLIC BLOOD PRESSURE: 86 MMHG | BODY MASS INDEX: 28.72 KG/M2 | RESPIRATION RATE: 16 BRPM | SYSTOLIC BLOOD PRESSURE: 134 MMHG

## 2020-07-22 DIAGNOSIS — R42 DIZZINESS AND GIDDINESS: ICD-10-CM

## 2020-07-22 DIAGNOSIS — Z79.82 LONG TERM (CURRENT) USE OF ASPIRIN: ICD-10-CM

## 2020-07-22 DIAGNOSIS — R41.3 MEMORY DIFFICULTIES: Primary | ICD-10-CM

## 2020-07-22 PROBLEM — D50.9 CHRONIC IRON DEFICIENCY ANEMIA: Status: ACTIVE | Noted: 2020-07-22

## 2020-07-22 PROBLEM — G47.33 OSA (OBSTRUCTIVE SLEEP APNEA): Status: ACTIVE | Noted: 2020-07-22

## 2020-07-22 PROBLEM — H52.223 REGULAR ASTIGMATISM OF BOTH EYES: Status: ACTIVE | Noted: 2020-07-22

## 2020-07-22 PROBLEM — I25.10 CORONARY ARTERY DISEASE INVOLVING NATIVE CORONARY ARTERY OF NATIVE HEART WITHOUT ANGINA PECTORIS: Status: ACTIVE | Noted: 2020-07-22

## 2020-07-22 PROBLEM — M54.16 LUMBAR RADICULOPATHY: Status: ACTIVE | Noted: 2020-07-22

## 2020-07-22 PROBLEM — H90.6 MIXED CONDUCTIVE AND SENSORINEURAL HEARING LOSS OF BOTH EARS: Status: ACTIVE | Noted: 2020-07-22

## 2020-07-22 PROBLEM — M16.12 PRIMARY OSTEOARTHRITIS OF LEFT HIP: Status: ACTIVE | Noted: 2020-07-22

## 2020-07-22 PROBLEM — Z86.718 HISTORY OF DVT OF LOWER EXTREMITY: Status: ACTIVE | Noted: 2020-07-22

## 2020-07-22 PROBLEM — F32.4 MAJOR DEPRESSIVE DISORDER, SINGLE EPISODE, IN PARTIAL REMISSION (H): Status: ACTIVE | Noted: 2020-07-22

## 2020-07-22 PROBLEM — E11.9 TYPE 2 DIABETES MELLITUS WITHOUT COMPLICATIONS (H): Status: ACTIVE | Noted: 2020-07-22

## 2020-07-22 LAB
ALBUMIN SERPL-MCNC: 4.1 G/DL (ref 3.4–5)
ALP SERPL-CCNC: 98 U/L (ref 40–150)
ALT SERPL W P-5'-P-CCNC: 31 U/L (ref 0–70)
ANION GAP SERPL CALCULATED.3IONS-SCNC: 6 MMOL/L (ref 3–14)
AST SERPL W P-5'-P-CCNC: 26 U/L (ref 0–45)
BASOPHILS # BLD AUTO: 0 10E9/L (ref 0–0.2)
BASOPHILS NFR BLD AUTO: 0.3 %
BILIRUB SERPL-MCNC: 0.3 MG/DL (ref 0.2–1.3)
BUN SERPL-MCNC: 22 MG/DL (ref 7–30)
CALCIUM SERPL-MCNC: 10 MG/DL (ref 8.5–10.1)
CHLORIDE SERPL-SCNC: 107 MMOL/L (ref 94–109)
CO2 SERPL-SCNC: 26 MMOL/L (ref 20–32)
CREAT SERPL-MCNC: 0.76 MG/DL (ref 0.66–1.25)
DIFFERENTIAL METHOD BLD: NORMAL
EOSINOPHIL # BLD AUTO: 0.2 10E9/L (ref 0–0.7)
EOSINOPHIL NFR BLD AUTO: 2.6 %
ERYTHROCYTE [DISTWIDTH] IN BLOOD BY AUTOMATED COUNT: 14.6 % (ref 10–15)
GFR SERPL CREATININE-BSD FRML MDRD: 81 ML/MIN/{1.73_M2}
GLUCOSE SERPL-MCNC: 125 MG/DL (ref 70–99)
HCT VFR BLD AUTO: 44.5 % (ref 40–53)
HGB BLD-MCNC: 14.9 G/DL (ref 13.3–17.7)
LYMPHOCYTES # BLD AUTO: 2.3 10E9/L (ref 0.8–5.3)
LYMPHOCYTES NFR BLD AUTO: 31.5 %
MCH RBC QN AUTO: 29.6 PG (ref 26.5–33)
MCHC RBC AUTO-ENTMCNC: 33.5 G/DL (ref 31.5–36.5)
MCV RBC AUTO: 88 FL (ref 78–100)
MONOCYTES # BLD AUTO: 0.8 10E9/L (ref 0–1.3)
MONOCYTES NFR BLD AUTO: 10.8 %
NEUTROPHILS # BLD AUTO: 4 10E9/L (ref 1.6–8.3)
NEUTROPHILS NFR BLD AUTO: 54.8 %
PLATELET # BLD AUTO: 180 10E9/L (ref 150–450)
POTASSIUM SERPL-SCNC: 3.9 MMOL/L (ref 3.4–5.3)
PROT SERPL-MCNC: 7.6 G/DL (ref 6.8–8.8)
RBC # BLD AUTO: 5.04 10E12/L (ref 4.4–5.9)
SODIUM SERPL-SCNC: 139 MMOL/L (ref 133–144)
TSH SERPL DL<=0.005 MIU/L-ACNC: 0.95 MU/L (ref 0.4–4)
VIT B12 SERPL-MCNC: 566 PG/ML (ref 193–986)
WBC # BLD AUTO: 7.2 10E9/L (ref 4–11)

## 2020-07-22 PROCEDURE — 82306 VITAMIN D 25 HYDROXY: CPT | Performed by: FAMILY MEDICINE

## 2020-07-22 PROCEDURE — 99204 OFFICE O/P NEW MOD 45 MIN: CPT | Performed by: FAMILY MEDICINE

## 2020-07-22 PROCEDURE — 82607 VITAMIN B-12: CPT | Performed by: FAMILY MEDICINE

## 2020-07-22 PROCEDURE — 85025 COMPLETE CBC W/AUTO DIFF WBC: CPT | Performed by: FAMILY MEDICINE

## 2020-07-22 PROCEDURE — 84443 ASSAY THYROID STIM HORMONE: CPT | Performed by: FAMILY MEDICINE

## 2020-07-22 PROCEDURE — 80053 COMPREHEN METABOLIC PANEL: CPT | Performed by: FAMILY MEDICINE

## 2020-07-22 RX ORDER — NITROGLYCERIN 0.4 MG/1
0.4 TABLET SUBLINGUAL EVERY 5 MIN PRN
COMMUNITY

## 2020-07-22 RX ORDER — ATORVASTATIN CALCIUM 80 MG/1
40 TABLET, FILM COATED ORAL DAILY
COMMUNITY
End: 2020-07-22

## 2020-07-22 RX ORDER — LIDOCAINE 50 MG/G
2 PATCH TOPICAL DAILY PRN
COMMUNITY

## 2020-07-22 RX ORDER — ACETAMINOPHEN 500 MG
500-1000 TABLET ORAL EVERY 6 HOURS PRN
Status: ON HOLD | COMMUNITY
End: 2024-05-23

## 2020-07-22 RX ORDER — FERROUS SULFATE 325(65) MG
325 TABLET, DELAYED RELEASE (ENTERIC COATED) ORAL DAILY
COMMUNITY

## 2020-07-22 RX ORDER — AMOXICILLIN 500 MG/1
500 CAPSULE ORAL
COMMUNITY
End: 2023-12-10

## 2020-07-22 RX ORDER — CELECOXIB 100 MG/1
100 CAPSULE ORAL 2 TIMES DAILY PRN
COMMUNITY
End: 2023-12-10

## 2020-07-22 RX ORDER — HYDROCORTISONE 2.5 %
CREAM (GRAM) TOPICAL 2 TIMES DAILY PRN
COMMUNITY
End: 2024-05-20

## 2020-07-22 RX ORDER — OLOPATADINE HYDROCHLORIDE 1 MG/ML
1 SOLUTION/ DROPS OPHTHALMIC 2 TIMES DAILY
COMMUNITY

## 2020-07-22 RX ORDER — ASPIRIN 81 MG/1
81 TABLET ORAL DAILY
COMMUNITY
End: 2023-12-10

## 2020-07-22 RX ORDER — KETOCONAZOLE 20 MG/ML
SHAMPOO TOPICAL
COMMUNITY

## 2020-07-22 ASSESSMENT — MIFFLIN-ST. JEOR: SCORE: 1573.23

## 2020-07-22 NOTE — PROGRESS NOTES
"Subjective     Familia Sales is a 89 year old male who presents to clinic today for the following health issues:    HPI     Chief Complaint   Patient presents with     Establish Care     [facility]  Would like to establish with primary care closer to home.     Depression Screening     pt scored 2 on PHQ2     Health Maintenance     pt will bring in copy of ACP          [facility]    Memory difficulty      Duration: worsening in the last \"several months\"; milder for the last 2 years    Description (location/character/radiation): forgetfulness (names, what he needs to get in the garage, how to operate computer programs, how to execute some carpentry projects). Denies difficulty with math, directions, place, time.     Intensity:  moderate    Accompanying signs and symptoms: intermittent left sided posterior headaches; irritability; depressed because of the above; recurrent dizziness and unsteady gait requiring walker all the time; denies nightmares or loss of sleep; denies wandering aimlessly; denies illegible writing    History (similar episodes/previous evaluation): None; denies head trauma; [place] ; former smoker - stopped in the distant past.    Precipitating or alleviating factors: None    Therapies tried and outcome: None     Patient reports he has a cardiologists he regularly sees.    Patient reports to provider there is leukemia hx in the famlily - he could not remember what the disease is called during intake.      Patient Active Problem List   Diagnosis     Chronic iron deficiency anemia     Type 2 diabetes mellitus without complications (H)     Major depressive disorder, single episode, in partial remission (H)     TESSY (obstructive sleep apnea)     Regular astigmatism of both eyes     Mixed conductive and sensorineural hearing loss of both ears     Coronary artery disease involving native coronary artery of native heart without angina pectoris "     History of DVT of lower extremity     Primary osteoarthritis of left hip     Lumbar radiculopathy     Past Surgical History:   Procedure Laterality Date     C ANESTH,LUMBAR SPINE,CORD SURGERY  10/28/97 & 98    L3 to sacrum with spinal fusion L4-L% and decompression L5-S1     C ANESTH,TOTAL KNEE ARTHROPLASTY  03    with revision left 04     C APPENDECTOMY  MAY 1949 OR      C FOOT/TOES SURGERY PROC UNLISTED  1989     HC DEST LOC LES CHOROID, PHOTOCOAG >=1 SESSION  06    LASIK     HERNIA REPAIR, UMBILICAL  06     SURGICAL HISTORY OF -   1959    CARTILAGE REMOVED FROM NOSE     SURGICAL HISTORY OF -   2008    LUMBAR DECOMPRESSION AT L2-3 AND L3-4     SURGICAL HISTORY OF -   09 THRU 3/06/09    CORTIZONE INJECTIOSN A SERIES OF 8     SURGICAL HISTORY OF -       INJECTION L3 NERVE ENDING       Social History     Tobacco Use     Smoking status: Former Smoker     Packs/day: 1.00     Years: 24.00     Pack years: 24.00     Types: Cigarettes     Last attempt to quit: 1964     Years since quittin.5     Smokeless tobacco: Never Used   Substance Use Topics     Alcohol use: Yes     Comment: very rare     Family History   Problem Relation Age of Onset     Gastrointestinal Disease Mother         ulcers     Cancer Mother         brain     Cancer Maternal Grandfather      Depression Daughter      Liver Disease Daughter      Diabetes Daughter          Current Outpatient Medications   Medication Sig Dispense Refill     acetaminophen (TYLENOL) 500 MG tablet Take 500-1,000 mg by mouth every 6 hours as needed for mild pain       amoxicillin (AMOXIL) 500 MG capsule Take 500 mg by mouth Take four capsules 1 hour before dental procedures.       aspirin 81 MG EC tablet Take 81 mg by mouth daily       celecoxib (CELEBREX) 100 MG capsule Take 100 mg by mouth 2 times daily as needed for pain       [employer]S GLUCOSAMINE-CHONDROIT-MSM PO 1 tablet daily       ferrous sulfate (FE TABS)  325 (65 Fe) MG EC tablet Take 325 mg by mouth daily       hydrocortisone 2.5 % cream Apply topically 2 times daily       ketoconazole (NIZORAL) 2 % external shampoo Shampoo scalp 3 times weekly       lidocaine (LIDODERM) 5 % patch Place 2 patches onto the skin every 24 hours To prevent lidocaine toxicity, patient should be patch free for 12 hrs daily.       nitroGLYcerin (NITROSTAT) 0.4 MG sublingual tablet Place 0.4 mg under the tongue every 5 minutes as needed for chest pain For chest pain place 1 tablet under the tongue every 5 minutes for 3 doses. If symptoms persist 5 minutes after 1st dose call 911.       olopatadine (PATANOL) 0.1 % ophthalmic solution Place 1 drop into both eyes 2 times daily prn       sertraline (ZOLOFT) 50 MG tablet Take 75 mg by mouth daily       Allergies   Allergen Reactions     Codeine Itching     Duloxetine      Dye [Contrast Dye]      ONE REACTION TO INJECTED DYE IN SHOULDER, ABOUT MAY 1965.     Morphine      Motrin [Ibuprofen Micronized] Itching     Tagamet Itching     Vicodin [Hydrocodone-Acetaminophen]      EXTREME SWEATING, FLUSHING AND LIGHT-HEADEDNESS.       Hay Fever & [A.R.M.]      BP Readings from Last 3 Encounters:   07/22/20 134/86   06/09/10 122/78    Wt Readings from Last 3 Encounters:   07/22/20 91 kg (200 lb 9.6 oz)   06/09/10 93 kg (205 lb)                    Reviewed and updated as needed this visit by Provider  Tobacco  Allergies  Meds  Problems  Med Hx  Surg Hx  Fam Hx         Review of Systems   C: NEGATIVE for fever, chills, or change in weight  I: NEGATIVE for worrisome rashes, moles or lesions  E: NEGATIVE for vision changes or irritation  ENT: NEGATIVE for ear, mouth and throat problems  R: NEGATIVE for significant cough or SOB  CV: NEGATIVE for chest pain, palpitations or peripheral edema  GI: NEGATIVE for nausea, abdominal pain, heartburn, or change in bowel habits  :negative for dysuria, hematuria, decreased urinary stream, or discharge  M: NEGATIVE  "for significant arthralgias or myalgia  N: NEGATIVE for weakness, or paresthesias  E: NEGATIVE for temperature intolerance, skin/hair changes  H: NEGATIVE for bleeding problems  P: persistent mild depressed mood      Objective    /86   Pulse 69   Temp 98.8  F (37.1  C) (Tympanic)   Resp 16   Ht 1.765 m (5' 9.5\")   Wt 91 kg (200 lb 9.6 oz)   SpO2 97%   BMI 29.20 kg/m    Body mass index is 29.2 kg/m .  Physical Exam   GENERAL: alert and no distress, ambulatory w/ a walker  NECK: no tenderness, no adenopathy,  Thyroid not enlarged  RESP: lungs clear to auscultation - no rales, no rhonchi, no wheezes  CV: regular rates and rhythm, no murmur  MS: no edema  SKIN: no suspicious lesions, no rashes  Neurological:  Mental: awake, follows commands, no aphasia or dysarthria. Fund of knowledge is normal for age. Poor recent recall.  Cranial Nerves:  CN II: visual acuity - is able to accurately count fingers with each eye.  CN III, IV, VI: EOM intact, pupils equal and reactive  CN V: facial sensation grossly intact  CN VII: face symmetric, no facial droop  CN VIII: hearing decreased bilaterally  CN IX: palate elevation symmetric, uvula at midline  CN XI SCM normal, shoulder shrug nl  CN XII: tongue midline  Motor: Strength: 5/5 in all major groups of all extremities. Normal tone.  There isno tremor. Writing is legible.  no other abnormal movements. no pronator drift b/l.  Reflexes: Triceps, biceps, brachioradialis, patellar, and achilles reflexes normal and symmetric. No clonus noted. Toes are down-going b/l.   Sensory: temperature, light touch, pinprick, and vibration grossly intact. Romberg: negative.  Coordination: FNF and heel-shin tests normal b/l. no dysdiadochokinesia with rapid alternating movements.  Gait:  slightly wide based, no shuffling  MMSE: only recalled 1 of the three objects on recent recall; all other points were met.  Diagnostic Test Results:  none         Assessment & Plan     Familia was seen today " for establish care, depression screening and health maintenance.    Diagnoses and all orders for this visit:    Memory difficulties  -     CBC with platelets differential  -     Comprehensive metabolic panel  -     TSH with free T4 reflex  -     Vitamin D Deficiency  -     Vitamin B12  -     MR Brain w/o Contrast; Future  -     MRA Brain ([place]) wo & w Contrast; Future  -     NEUROPSYCHOLOGY REFERRAL  Concerning for dementia: senila, Alzheimer's, vascular, LBD, other forms   Discussed eval with brain imaging, (since with  Dizziness and gait instability as well), and blood tests.  Discussed neuropsych eval - patient concurred.  Fall precautions advised.  Advised driving precautions. Low threshold to advice no driving if with confusion or progressive memory or executory dysfunction.  Return precautions discussed and given to patient.    Long term (current) use of aspirin   -     Vitamin D Deficiency    Dizziness and giddiness   -     MRA Brain ([place] [facility] [place]) wo & w Contrast; Future  -     NEUROPSYCHOLOGY REFERRAL  Fall precautions.         Patient Instructions   .To schedule the MRI and MRA of the brain, call [phone].    Schedule neuropsychology consult for the memory concern.    You will be contacted in 1-2 days for results of your lab tests.    Continue all medications as prescribed.      Thank you for choosing [facility].  You may be receiving an email and/or telephone survey request from [facility] Customer Experience regarding your visit today.  Please take a few minutes to respond to the survey to let us know how we are doing.      If you have questions or concerns, please contact us via "[employer] or you can contact your care team at [phone].    Our Clinic hours are:  Monday 6:40 am  to 7:00 pm  Tuesday -Friday 6:40 am to 5:00 pm    The [place] outpatient lab hours are:  Monday - Friday 6:10 am to 4:45 pm  Saturdays 7:00 am to 11:00 am  Appointments are required, call  "[phone]    If you have clinical questions after hours or would like to schedule an appointment,  call the clinic at [phone].    Patient Education   Coping with Memory Loss  What happens when you have memory loss?  Memory loss causes problems with thinking, learning and memory. You may feel forgetful or have trouble focusing on tasks.  Memory loss may be a side effect of medicine, chemotherapy or radiation. In these cases, problems with memory may improve after you finish your treatment. But you could have long-term problems.  Other factors that affect memory and your ability to focus include:    Aging    Illness    Depression    Hormonal changes    Anemia (low red blood cells)    Stress.  What can I do to treat or prevent memory loss?  Problems with thinking and memory can be very frustrating. There is no standard treatment at this time. But there are things you can do to reduce the effects of memory loss:    Really pay attention. Use your eyes, ears and sense of touch to remember things. Focus when someone tells you something.    Limit distractions when you need to complete tasks that require you to focus.    Tell yourself what you are doing as you do it. For example, if you're going out for a few hours, as you close the door tell yourself, \"I'm locking the door now and putting the key in my pocket so I don't have to worry about whether I locked the door.\"    Give yourself clear reminders. If you need to buy dog food, put the empty bag at the door so you'll see it as you leave the house. Or write yourself a note and put it where it's needed.    Keep things in the same place. This way you don't have to wonder where you put your keys, remote control or medicine.    Keep a journal of daily events and activities. Carry a notebook and write down important information that you want to remember.    Exercise your brain. For example, do crossword puzzles, painting, sudoku.    Use word play and rhyming to help " remember things.    Use helpful tools, such as:  ? A daily planner  ? A wall calendar  ? Checklists  ? Sticky notes  ? A  near the door  ? A pre-programmed phone  ? A wristwatch with an alarm  ? A pill box to keep track of your medicines.    Get plenty of sleep and exercise each day.    Stay positive, and try to manage stress.    If you think you may be depressed, talk to your doctor. Depression should be treated.  When should I call my care team?  Call your care team if:    You feel depressed.    You cannot complete basic daily activities.  Comments:  __________________________________________  __________________________________________  __________________________________________  __________________________________________  __________________________________________  __________________________________________  __________________________________________  For informational purposes only. Not to replace the advice of your health care provider. Copyright   2006 [employer]. All rights reserved. Clinically reviewed by [facility]. SMARTworks 171573 - REV 04/19.           Return in about 2 weeks (around 8/5/2020) for follow up on tests.    Heriberto Reinoso MD  [facility]

## 2020-07-22 NOTE — PATIENT INSTRUCTIONS
.To schedule the MRI and MRA of the brain, call [phone].    Schedule neuropsychology consult for the memory concern.    You will be contacted in 1-2 days for results of your lab tests.    Continue all medications as prescribed.      Thank you for choosing [facility].  You may be receiving an email and/or telephone survey request from [facility] Customer Experience regarding your visit today.  Please take a few minutes to respond to the survey to let us know how we are doing.      If you have questions or concerns, please contact us via [employer] or you can contact your care team at [phone].    Our Clinic hours are:  Monday 6:40 am  to 7:00 pm  Tuesday -Friday 6:40 am to 5:00 pm    The [place] outpatient lab hours are:  Monday - Friday 6:10 am to 4:45 pm  Saturdays 7:00 am to 11:00 am  Appointments are required, call [phone]    If you have clinical questions after hours or would like to schedule an appointment,  call the clinic at [phone].    Patient Education   Coping with Memory Loss  What happens when you have memory loss?  Memory loss causes problems with thinking, learning and memory. You may feel forgetful or have trouble focusing on tasks.  Memory loss may be a side effect of medicine, chemotherapy or radiation. In these cases, problems with memory may improve after you finish your treatment. But you could have long-term problems.  Other factors that affect memory and your ability to focus include:    Aging    Illness    Depression    Hormonal changes    Anemia (low red blood cells)    Stress.  What can I do to treat or prevent memory loss?  Problems with thinking and memory can be very frustrating. There is no standard treatment at this time. But there are things you can do to reduce the effects of memory loss:    Really pay attention. Use your eyes, ears and sense of touch to remember things. Focus when someone tells you something.    Limit distractions when you need to complete tasks  "that require you to focus.    Tell yourself what you are doing as you do it. For example, if you're going out for a few hours, as you close the door tell yourself, \"I'm locking the door now and putting the key in my pocket so I don't have to worry about whether I locked the door.\"    Give yourself clear reminders. If you need to buy dog food, put the empty bag at the door so you'll see it as you leave the house. Or write yourself a note and put it where it's needed.    Keep things in the same place. This way you don't have to wonder where you put your keys, remote control or medicine.    Keep a journal of daily events and activities. Carry a notebook and write down important information that you want to remember.    Exercise your brain. For example, do crossword puzzles, painting, sudoku.    Use word play and rhyming to help remember things.    Use helpful tools, such as:  ? A daily planner  ? A wall calendar  ? Checklists  ? Sticky notes  ? A  near the door  ? A pre-programmed phone  ? A wristwatch with an alarm  ? A pill box to keep track of your medicines.    Get plenty of sleep and exercise each day.    Stay positive, and try to manage stress.    If you think you may be depressed, talk to your doctor. Depression should be treated.  When should I call my care team?  Call your care team if:    You feel depressed.    You cannot complete basic daily activities.  Comments:  __________________________________________  __________________________________________  __________________________________________  __________________________________________  __________________________________________  __________________________________________  __________________________________________  For informational purposes only. Not to replace the advice of your health care provider. Copyright   2006 [employer]. All rights reserved. Clinically reviewed by [facility]. SMARTworks 711640 - REV 04/19.       "

## 2020-07-23 LAB — DEPRECATED CALCIDIOL+CALCIFEROL SERPL-MC: 24 UG/L (ref 20–75)

## 2020-07-30 ENCOUNTER — OFFICE VISIT (OUTPATIENT)
Dept: FAMILY MEDICINE | Facility: CLINIC | Age: 85
End: 2020-07-30
Payer: MEDICARE

## 2020-07-30 VITALS
OXYGEN SATURATION: 96 % | HEIGHT: 70 IN | DIASTOLIC BLOOD PRESSURE: 74 MMHG | HEART RATE: 68 BPM | RESPIRATION RATE: 16 BRPM | SYSTOLIC BLOOD PRESSURE: 118 MMHG | BODY MASS INDEX: 28.63 KG/M2 | TEMPERATURE: 97.8 F | WEIGHT: 200 LBS

## 2020-07-30 DIAGNOSIS — R41.3 MEMORY DIFFICULTIES: Primary | ICD-10-CM

## 2020-07-30 DIAGNOSIS — R42 DIZZINESS AND GIDDINESS: ICD-10-CM

## 2020-07-30 PROCEDURE — 99213 OFFICE O/P EST LOW 20 MIN: CPT | Performed by: FAMILY MEDICINE

## 2020-07-30 ASSESSMENT — MIFFLIN-ST. JEOR: SCORE: 1570.5

## 2020-07-30 NOTE — PROGRESS NOTES
Subjective     Familia Sales is a 89 year old male who presents to clinic today for the following health issues:  Chief Complaint   Patient presents with     discuss order     Pt here to discuss mri order that was placed last week.  Pt is unable to have due to medtronic device he has.  Wondering about getting it removed or ordering ct instead.       HPI     Patient said he was told by the MRI technicians that his Medtronic device is not compatible for an MRI.  He was given choice to modify the device or change the order for brain imaging to CT.  Patient prefers not to have to go through a procedure.  He was requested for brain imaging due to memorry difficulties and dizziness.  He said he got a voicemail from neuropsych clinic but when he called, he got a recording that he will be called back.    Tried to call the neuropsych department he was referred to now, but nly got recording and was asked to leave voicemail. VM with patient's information was left.    Patient denies new symptoms since last visit.    Patient Active Problem List   Diagnosis     Chronic iron deficiency anemia     Type 2 diabetes mellitus without complications (H)     Major depressive disorder, single episode, in partial remission (H)     TESSY (obstructive sleep apnea)     Regular astigmatism of both eyes     Mixed conductive and sensorineural hearing loss of both ears     Coronary artery disease involving native coronary artery of native heart without angina pectoris     History of DVT of lower extremity     Primary osteoarthritis of left hip     Lumbar radiculopathy     Past Surgical History:   Procedure Laterality Date     C ANESTH,LUMBAR SPINE,CORD SURGERY  10/28/97 & 09/21/98    L3 to sacrum with spinal fusion L4-L% and decompression L5-S1     C ANESTH,TOTAL KNEE ARTHROPLASTY  04/16/03    with revision left 08/02/04     C APPENDECTOMY  MAY 1949 OR 1950     C FOOT/TOES SURGERY PROC UNLISTED  MARCH 1989     HC DEST LOC LES CHOROID, PHOTOCOAG >=1  SESSION  06    LASIK     HERNIA REPAIR, UMBILICAL  06     SURGICAL HISTORY OF -   1959    CARTILAGE REMOVED FROM NOSE     SURGICAL HISTORY OF -   2008    LUMBAR DECOMPRESSION AT L2-3 AND L3-4     SURGICAL HISTORY OF -   09 THRU 3/06/09    CORTIZONE INJECTIOSN A SERIES OF 8     SURGICAL HISTORY OF -       INJECTION L3 NERVE ENDING       Social History     Tobacco Use     Smoking status: Former Smoker     Packs/day: 1.00     Years: 24.00     Pack years: 24.00     Types: Cigarettes     Last attempt to quit: 1964     Years since quittin.6     Smokeless tobacco: Never Used   Substance Use Topics     Alcohol use: Yes     Comment: very rare     Family History   Problem Relation Age of Onset     Gastrointestinal Disease Mother         ulcers     Cancer Mother         brain     Cancer Maternal Grandfather      Depression Daughter      Liver Disease Daughter      Diabetes Daughter          Current Outpatient Medications   Medication Sig Dispense Refill     acetaminophen (TYLENOL) 500 MG tablet Take 500-1,000 mg by mouth every 6 hours as needed for mild pain       aspirin 81 MG EC tablet Take 81 mg by mouth daily       celecoxib (CELEBREX) 100 MG capsule Take 100 mg by mouth 2 times daily as needed for pain       CVS GLUCOSAMINE-CHONDROIT-MSM PO 1 tablet daily       ferrous sulfate (FE TABS) 325 (65 Fe) MG EC tablet Take 325 mg by mouth daily       hydrocortisone 2.5 % cream Apply topically 2 times daily       ketoconazole (NIZORAL) 2 % external shampoo Shampoo scalp 3 times weekly       lidocaine (LIDODERM) 5 % patch Place 2 patches onto the skin every 24 hours To prevent lidocaine toxicity, patient should be patch free for 12 hrs daily.       olopatadine (PATANOL) 0.1 % ophthalmic solution Place 1 drop into both eyes 2 times daily prn       sertraline (ZOLOFT) 50 MG tablet Take 75 mg by mouth daily       amoxicillin (AMOXIL) 500 MG capsule Take 500 mg by mouth Take four capsules 1  "hour before dental procedures.       nitroGLYcerin (NITROSTAT) 0.4 MG sublingual tablet Place 0.4 mg under the tongue every 5 minutes as needed for chest pain For chest pain place 1 tablet under the tongue every 5 minutes for 3 doses. If symptoms persist 5 minutes after 1st dose call 911.       Allergies   Allergen Reactions     Codeine Itching     Duloxetine      Dye [Contrast Dye]      ONE REACTION TO INJECTED DYE IN SHOULDER, ABOUT MAY 1965.     Morphine      Motrin [Ibuprofen Micronized] Itching     Tagamet Itching     Vicodin [Hydrocodone-Acetaminophen]      EXTREME SWEATING, FLUSHING AND LIGHT-HEADEDNESS.       Hay Fever & [A.R.M.]        Reviewed and updated as needed this visit by Provider         Review of Systems   Constitutional, HEENT, cardiovascular, pulmonary, GI, , musculoskeletal, neuro, skin, endocrine and psych systems are negative, except as otherwise noted.      Objective    /74   Pulse 68   Temp 97.8  F (36.6  C) (Tympanic)   Resp 16   Ht 1.765 m (5' 9.5\")   Wt 90.7 kg (200 lb)   SpO2 96%   BMI 29.11 kg/m    Body mass index is 29.11 kg/m .  Physical Exam   GEN: alert, oriented x 3, NAD, ambulates with walker.  SKIN: no  ecchymosis, no jaundice  Rest of exam deferred    Diagnostic Test Results:  none         Assessment & Plan     Familia was seen today for discuss order.    Diagnoses and all orders for this visit:    Memory difficulties  -     CT Head w/o & w Contrast; Future    Dizziness and giddiness   -     CT Head w/o & w Contrast; Future         Per patient request, changed order to cT brain.  Will wait for scheduling for neuropsych.  Further recommendations to be given when results are out.  Fall precautions.  Return precautions discussed and given to patient.      Patient Instructions   To schedule the cT scan, call [phone].      Wait for neuropsychology clinic call in the next 5-7 days to schedule your appointment with them.    If with any new concerns in the coming days, " contact the primary care clinic care team at any time.      Return in about 1 month (around 8/30/2020) for follow up after neuropsych evaluattion; sooner if with new concern..    Heriberto Reinoso MD  [facility]

## 2020-07-30 NOTE — PATIENT INSTRUCTIONS
To schedule the cT scan, call [phone].      Wait for neuropsychology clinic call in the next 5-7 days to schedule your appointment with them.    If with any new concerns in the coming days, contact the primary care clinic care team at any time.

## 2020-08-04 ENCOUNTER — HOSPITAL ENCOUNTER (OUTPATIENT)
Dept: CT IMAGING | Facility: CLINIC | Age: 85
Discharge: HOME | End: 2020-08-04
Attending: FAMILY MEDICINE | Admitting: FAMILY MEDICINE
Payer: MEDICARE

## 2020-08-04 DIAGNOSIS — R41.3 MEMORY DIFFICULTIES: ICD-10-CM

## 2020-08-04 DIAGNOSIS — R42 DIZZINESS AND GIDDINESS: ICD-10-CM

## 2020-08-04 PROCEDURE — 70450 CT HEAD/BRAIN W/O DYE: CPT

## 2020-08-13 ENCOUNTER — TELEPHONE (OUTPATIENT)
Dept: FAMILY MEDICINE | Facility: CLINIC | Age: 85
End: 2020-08-13

## 2020-08-13 DIAGNOSIS — M54.16 LUMBAR RADICULOPATHY: Primary | ICD-10-CM

## 2020-08-13 NOTE — TELEPHONE ENCOUNTER
Dr. Reinoso,    Please see message from the patient about not being able to have MRI as he has a stimulator in his back. Anna ADORNO RN

## 2020-08-13 NOTE — TELEPHONE ENCOUNTER
Reason for call:    Symptom or request:     Patient called stating that he was scheduled for MRI, however cant have it done as the mri machine is not compatible to his back stimulator--details on back stimulator: Medtronic stimulation system. Implanted 03/21/2011- model # [id number] medtronic # /[id number] repairs medtronic #.       Best Time:  any    Can we leave a detailed message on this number?  YES     Mayela VERDUZCO  Station

## 2020-08-14 ENCOUNTER — HOSPITAL ENCOUNTER (OUTPATIENT)
Dept: CT IMAGING | Facility: CLINIC | Age: 85
Discharge: HOME | End: 2020-08-14
Attending: FAMILY MEDICINE | Admitting: FAMILY MEDICINE
Payer: MEDICARE

## 2020-08-14 DIAGNOSIS — M54.16 LUMBAR RADICULOPATHY: ICD-10-CM

## 2020-08-14 PROCEDURE — 72131 CT LUMBAR SPINE W/O DYE: CPT

## 2020-08-17 DIAGNOSIS — M54.16 LUMBAR RADICULOPATHY: Primary | ICD-10-CM

## 2020-08-17 DIAGNOSIS — M51.369 DDD (DEGENERATIVE DISC DISEASE), LUMBAR: ICD-10-CM

## 2020-09-28 ENCOUNTER — VIRTUAL VISIT (OUTPATIENT)
Dept: NEUROPSYCHOLOGY | Facility: CLINIC | Age: 85
End: 2020-09-28
Payer: MEDICARE

## 2020-09-28 DIAGNOSIS — R41.3 MEMORY LOSS: Primary | ICD-10-CM

## 2020-09-28 DIAGNOSIS — G47.33 OSA (OBSTRUCTIVE SLEEP APNEA): ICD-10-CM

## 2020-09-28 DIAGNOSIS — F32.A DEPRESSION, UNSPECIFIED DEPRESSION TYPE: ICD-10-CM

## 2020-09-28 NOTE — LETTER
"2020       RE: Familia Sales  [address] [address]     Dear Colleague,    Thank you for referring your patient, Familia Sales, to the [facility] NEUROPSYCHOLOGY at [facility]. Please see a copy of my visit note below.    Familia Sales is a 89 year old male who is being evaluated via a billable video visit.      The patient has been notified of following:     \"This video visit will be conducted via a call between you and your physician/provider. We have found that certain health care needs can be provided without the need for an in-person physical exam.  This service lets us provide the care you need with a video conversation.  Video visits are billed at different rates depending on your insurance coverage.  Please reach out to your insurance provider with any questions.    If during the course of the call the physician/provider feels a video visit is not appropriate, you will not be charged for this service.\"    Patient has given verbal consent for Video visit? Yes  If you are dropped from the video visit, the video invite should be resent to: Send to e-mail at: [email or web address]  Will anyone else be joining your video visit? No    Video-Visit Details    Type of service:  Video Visit    Video Start Time: 12:38  Video End Time: 1:01    Originating Location (pt. Location): Home    Distant Location (provider location):  [facility] NEUROPSYCHOLOGY     Platform used for Video Visit: Beck    RE: Familia Sales  MR#: [id number]  : 3/1/1931  DOS: Sep 28, 2020  NIDIA:  2020    Neuropsychology Laboratory  [address] [address] [phone]    NEUROPSYCHOLOGICAL CONSULTATION      REASON FOR REFERRAL:  Familia Sales is a 89 year old male with 10 years of education plus a GED and technical training. The patient was referred for a neuropsychological evaluation by Dr. Reinoso to assess his cognitive and emotional " functioning secondary to memory difficulties. The evaluation was requested in order to document his current level of functioning, assist with the differential diagnosis and provide appropriate recommendations to the patient, family and treatment team. The patient was informed that the evaluation included multiple measures of performance and symptom validity, and he was encouraged to provide his best effort at all times.  The nature of the neuropsychological evaluation was also discussed, including limits of confidentiality (for suspected child or elder abuse, potential homicide or suicide, and court orders). The patient was also informed that the report would be placed on the electronic medical records system.  The patient was also given an opportunity to ask questions. The patient indicated that he understood the information and consented to participate in the evaluation.    Due to circumstances that prevent in-person clinical visits, this assessment was conducted using telehealth methods (including remote audiovisual presentation of test instructions and test stimuli). The standard administration of these procedures involves in-person, face-to-face methods. The impact of applying non-standard administration methods has been evaluated only in part by scientific research. While every effort was made to simulate standard assessment practices, the diagnostic conclusions and recommendations for treatment provided in this report are being advanced with these reservations.    PROCEDURES:   Review of records and clinical interview  Mental Status-orientation, Wide Range Achievement Test-4, Wechsler Adult Intelligence Scale-IV, Reyna Verbal Learning Test-Revised, Clock Drawing Test, Verbal Fluency Test, Oral Trailmaking Test, BDAE Complex Ideational Material, Test of Sustained Attention and Tracking, [place] Naming Test (15 Item version), RBANS, Geriatric Depression Scale, Cat Anxiety Inventory and [facility] Health and Safety  "Questions    REVIEW OF RECORDS: Medical records from 7/22/2020 noted that the patient has been experiencing memory difficulties over the past 2 years, but has become noticeably worse over the past several months.  Records indicated the patient had noticed forgetfulness, such as difficulty remembering names, how to operate computer programs, and how to complete carpentry projects.  Records noted the patient also reported \"intermittent left sided posterior headaches; irritability; depressed because of the above; recurrent dizziness and unsteady gait requiring walker all the time; denies nightmares or loss of sleep; denies wandering aimlessly; denies illegible writing.\"  The records indicated the patient denied any history of traumatic brain injury.  Records indicated that he is a former smoker and [employer] .  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  Records indicated the patient is being treated with acetaminophen, amoxicillin, aspirin, Celebrex, glucosamine-conduit, ferrous sulfate, hydrocortisone, ketoconazole, lidocaine, nitroglycerin, Patanol, and sertraline.     Records indicated that a CT scan of the brain was conducted on 8/4/2020 because an MRI could not be done. The report of the CT scan noted  There is diffuse parenchymal volume loss. White matter changes are present in the cerebral hemispheres that are consistent with small vessel ischemic disease in this age patient. There is no evidence of intracranial hemorrhage, mass, acute infarct or anomaly.      CLINICAL INTERVIEW: The patient was interviewed via video platform for this telehealth neuropsychological " "evaluation.  The patient was interviewed alone.  On interview, the patient confirmed he has concerns with his memory and he reported that he has \"slow recall\".  He also stated \"some days I do not remember much of anything.\"  The patient indicated that he will forget tasks his wife asked him to do, such as taking out the dog.  The patient reported that he has observed memory problems over the past 3 to 4 years which have gotten \"a little worse\" over time.  The patient also noted increased difficulty with word finding recently.  He also said that he has difficulty with attention and concentration, and often has to write down tasks to remember them.  However, the patient also said that he will forget to read these notes.  The patient denies difficulty with decision-making or problem-solving, but, per the patient, his wife thinks he has more difficulty with this.  Functionally, the patient reported that he drives and denied any difficulty with driving such as accidents, tickets, or getting lost.  The patient noted that his wife has always managed the household finances so this is not a change.  The patient denied difficulty with managing his own medications, and noted that he uses a pillbox to organize his medications.  He also denied difficulty with basic household chores or personal care activities.    In terms of his mood, he reported that it is \"down\" at times because of the COVID-19 pandemic.  However, he reported that he enjoys woodworking, and when he does it, this improves his mood.  He also noted that he and his wife are going on a vacation soon and he is looking forward to this.  The patient noted that most nights he sleeps 9 hours, although he had more difficulty last night.  He reported that he has sleep apnea and uses a CPAP machine.  The patient denied difficulty with appetite.  The patient reported that he has worked with both a psychiatrist and psychologist for anger problems, and [facility]  The patient denied any suicidal or homicidal ideation, denied any history of suicide attempts.  Further, he denied any history of hallucinations or delusions.  The patient denied any use of alcohol or illicit substances.  He reported that he stopped cigarette smoking in 1964.    Medically, the patient noted significant problems with back pain since he was injured in basic training in 1952.  He noted that he has had 3 back surgeries, but still has pain.  He also noted pain problems from a recent hip replacement surgery that was not healing properly.  Additionally, the patient noted a significant history for arthritis, type 2 diabetes, coronary artery disease with 2 stents, bilateral hearing problems with hearing aids, and cataract removal last year. The patient attributed his hearing problems to working as an  in the past. The patient noted that he wears eyeglasses all the time.  He denied any history of traumatic brain injury with loss of consciousness.  Further, he denied any history multiple sclerosis, stroke, seizures, movement disorders, hypercholesterolemia, hypothyroidism, cancer, liver disease, or kidney disease.  The patient said that his medications were up-to-date in the electronic medical record.      The patient reported that he stopped high school in his moiz year and join the .  He reported that he earned a GED and then attended technical school, including  training.  The patient noted that he worked as an  and then as a .  The patient noted that he was an average student overall, although his grades depended on the subject.  The patient denied ever being in special education or having repeat a grade.  He stated that he has been  for 42 years and has a blended family.  He noted that he has 3 biological children, including 3 daughters, and 3 adopted children, including one son and 2 daughters.  He also  noted that his wife has 3 surviving children.  The patient noted that he lives at home with his wife.  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    BEHAVIORAL OBSERVATIONS: On examination, the patient was casually but appropriately dressed and groomed. The patient was cooperative with the evaluation and was talkative.  The patient appeared to put forth his best effort on all tasks. Thus, the results of this evaluation are a reasonably valid reflection of the patient s current level of functioning. There were no indications of hallucinations, delusions or unusual thought processes. His speech was appropriate in rate, tone, and volume. The patient was generally well oriented and his affect was somewhat constricted.     RESULTS: Formal performance validity measures were within expected limits and consistent with the above-noted observations.  Psychometric estimates of the patient's premorbid intellectual functioning based on single word reading ability were in the lower half of the average range, which is generally consistent with his educational and occupational history.  Thus, there was no evidence for significant decline in global cognitive functioning.    Measures of attention/concentration were mildly variable.  For example, a digit span task was in the average range.  Further, an oral sequencing task that integrates attention was within expected limits.  However, a timed measure of attention and processing speed was poorer than expected, indicating evidence for mildly slowed speed of information processing.    Measures of the patient's memory functioning were also mildly variable.  For example, there was evidence for mild inefficiency in encoding of new verbal information on a word list learning task.  However, the patient's performance on a more structured story memory task was better, and within expected limits.  Delayed recall and recognition were consistent with  his initial encoding on these tasks.  Thus, there was no evidence for rapid forgetting of previously learned verbal information.  Visual memory could not be assessed due to the telehealth platform for this evaluation.    Expressive and receptive language functioning was broadly within expected limits.  For example, confrontation naming was within expectancy.  Vocabulary ability was in the low average range.  Semantic and phonemic fluency were at the lower end of expected limits as well.  Receptive language functioning, as measured by a complex ideational material task, was well within expected limits.  Visual-spatial and visual constructional abilities were also well within expected limits.  For example, motor-free visual reasoning was in the average range.  Likewise, motor-free line orientation judgment was well within expected limits.  A clock drawing task did not indicate evidence for significant visual-spatial distortions.    Measures of the patient's executive functioning were also mildly variable.  For example, verbal and visual reasoning abilities were in the average range.  Verbal fluency was at the lower end of expected limits, and a clock drawing task did not indicate evidence for significant difficulty with planning and organization.  A complex oral sequencing task that integrates cognitive flexibility was slightly poorer than expected.  Further, a functional measure of basic health and safety awareness was poor than expected.    Assessment of the patient's emotional functioning was completed utilizing the clinical interview and self-report measures of depression and anxiety.  On the self-report measures, the patient noted mild symptoms of depression and anxiety, which is consistent with his report during the interview.    SUMMARY:  The following opinions and recommendations are based on the interview and formal testing data obtained via telephone, thus all results were interpreted with caution.  In  "summary, the neuropsychological assessment results indicated evidence for mild variability in attention, as well as mildly slowed speed of information processing.  The variable attention and slowed processing speed likely resulted in mild variability in encoding of new verbal information, but the patient performed better on more structured verbal memory task.  Further, there was no evidence for rapid forgetting of previously learned verbal information.  Expressive and receptive language functioning and visual-spatial abilities were generally within expected limits.  Executive functioning was mildly variable as well.  There was also evidence for mild depressive and anxiety symptoms, consistent with a depressive disorder, which is also consistent with the records.  While the etiology of the variability in cognitive functioning could not be completely determined based on this evaluation alone, the results indicated that a progressive dementing condition, such as Alzheimer's disease, is unlikely at this time.  More likely, the patient's depressive and anxiety symptoms are likely contributing to the variability in attention and memory, as well as slightly slowed processing speed, observed in this evaluation.  Other factors, such as his sleep apnea and chronic pain, could be contributing to his cognitive difficulties.    RECOMMENDATIONS:   1.  Given these results, continued psychiatric intervention is recommended for his emotional distress.  The patient indicated that he [facility], but this is \"on hold.\"  The patient strongly encouraged to reengage in psychiatric intervention.  2.  Further, returning to psychotherapeutic intervention is also recommended.  A highly structured cognitive behavioral intervention focused on coping and stress management likely would be the most helpful for him.  3. A suicide risk assessment was conducted with this patient, and it was determined " that the patient likely was not an imminent danger to himself. Nonetheless, ongoing close monitoring of the patient s suicidal ideation by health care providers is recommended.   4. The patient may find the following attention and organizational strategies helpful:     Use cell phone reminders to help monitor upcoming appointments and due dates as well as other important tasks (e.g., when to take medications). Set the reminder to go off several times prior to the event with advanced notice (e.g., one week before, two days before, the day before, and the morning of the event).     He should attempt to reduce distractions at home. Having a clutter-free work environment and removing or at least making it harder to access potential distractions would help optimize his attention. He might also consider facing away from high traffic areas and using earplugs or noise cancellation headphones when desiring a quiet work environment.    Taking short breaks during her day may help optimize and maintain her concentration.     Make to-do lists and prioritize tasks. Break down large tasks into smaller steps and check off each small step when completed.   5. In order to promote learning and memorization of new verbal material, it is recommended that the patient add structure to the material he is learning to promote subsequent recall (e.g., use mnemonic devices, acronyms, make up a story or song). Additionally, he is encouraged to use visual aids, such as flash cards, diagrams, charts, etc.  6. Chronic pain can interfere with cognitive functioning in daily life. The following strategies can be helpful in minimizing the impact of pain on concentration and memory:    Get an adequate amount of restorative sleep each night     Practice mindfulness exercises often      Maintain a schedule that allows for sufficient time to engage in pleasurable activities.     A good self-help resource for managing pain and instruction in relaxation  "techniques is  Managing Pain Before it Manages You  by Tati Peraza M.D., Ph.D., [employer].     \"Chronic Pain Control Workbook: A Step-by-Step Guide for Coping with and Overcoming Pain\" By Emilia Mcguire and Kellen aWre. [employer].     \"Chronic Pain Control Workbook: A step by step guide for coping with and overcoming pain\" by Emilia Mcguire and Kellen Ware. [employer]     LEE ANN Marie (1987). Mastering Pain: A Twelve-Step Program for Coping with Chronic Pain. [employer].     The following online resources can provide additional helpful information about pain management generally.     www.ninds.nih.gov/health_and_medical/disorders/chronic_pain.htm      American Pain Society at www.ampainsoc.org      International Association for the Study of Pain at www.iasp-pain.org     American Academy of Pain Medicine at www.painmed.org    7. A full medical evaluation of any treatable causes of cognitive changes is also recommended, if this has not already been accomplished.  Evaluation of any infectious processes, vitamin deficiencies or other metabolic abnormalities is recommended, to rule out these as contributors to his current concerns.  8.  The results indicated that the patient has at least adequate capacity for informed personal and medical decision making.  However, given the variability in cognitive functioning, family assistance with complex decision-making is recommended.  9. The results of the evaluation now constitute a baseline of the patient s cognitive functioning.  Given the evidence for variability in cognitive functioning, a referral for a neuropsychological reevaluation in approximately one year is recommended in order to clarify the differential diagnosis, document any changes in cognitive functioning, and provide further recommendations and prognosis to the patient, family and treatment team.    Results were provided in a telephone call to " the patient on 9/28/2020 and his questions were answered. Thank you for referring this interesting individual. If you have any questions, please feel free to contact me.      Rowdy Mckeon, PhD, ABPP, LP  Professor and Licensed Psychologist [id number]  Board Certified Clinical Neuropsychologist  Phone: [phone]  Fax: [phone]    Time Spent:      Minutes Date Code Units   Total Time-Neuropsychologist Professional 227 9/28/2020 11077 1     9/28/2020 86607 3   Neuropsychologist Admin/scoring 0 9/28/2020 65415 0     9/28/2020 17599 0   Diagnostic Interview 23 9/28/2020 42068 1   Psychometrician Time-Test Administration/Score 128 9/28/2020 22636 1     9/28/2020 40045 3   Diagnosis R41.3 F32.9 G47.33

## 2020-09-28 NOTE — PROGRESS NOTES
Face Sheet          Patient:  Familia Sales MRN:  [id number] :  3/1/1931 CRUZ:  2020   Education: 10 Handedness:  RH Provider: [place] Psychometrist:  RAEGAN   Station: OP Age: 89 Visit Type: (tel/vid) Video Platform (if video) [employer]   Orientation  WRAT-4  WAIS-IV Raw SS RDS   Personal Info 3 Raw 48 Digit Span 21 10 8   Place 1 SS 95 Vocabulary 19 7    Time 0 %ile 37 Matrix Reasoning 7 9    Presidents 6 Grade Equivalence 6 Similarities 24 12    BNT-15  COWAT  Animal Fluency      Raw 15 Form FAS Raw 11     z 0.90 Raw 24 SS 5     Total Stim Correct 0 SS 6 T 39     Total Phonemic Correct 0 T 41       Complex Ideational Material  Clock Drawing  RBANS Line Orientation      Raw 12 Command 2/3 Raw 19     SS 12 Copy 2/3 z 1.27     T 63   %ile >75     Oral Trails    TSAT      Trails A 10 Z 0.75 Total time 138 Z -1.22   Trails B - see errors 44 Z -0.68 Total Errors 3 Z -0.28   HVLT           Trial 1 3   T-Score   T-Score   Trial 2 5 Total Recall 15 35 True Positives 10    Trial 3 7 Delayed Recall 4 37 False Positives 1    Learning 4 Percent Retention 57% 38 Discrim. Index 9 41   RBANS Story           Total Immediate 12 z Score -0.85 Scaled Score 8     Total Delay 9 z Score 0.57 Scaled Score 11     ILS Health and Safety Questionnaire         Total 30 Classification Low       SANGITA          Total 13 Interpretation Mild       GDS          Total 11 Interpretation Mild/moderate

## 2020-09-28 NOTE — PROGRESS NOTES
The patient was seen for neuropsychological evaluation via telehealth at the request of Heriberto Reinoso MD for the purposes of diagnostic clarification and treatment planning.  128 minutes of video test administration and scoring were provided by this writer.  Please see Dr. Rowdy Mckeon's report for a full interpretation of the findings.    Video Start Time 1: 12:28PM  Video End Time 1: 12:38PM    Video Start Time 2: 1:06PM  Video End Time 2: 2:40PM    Bella Berry  Psychometrist

## 2020-09-28 NOTE — PROGRESS NOTES
"Familia Sales is a 89 year old male who is being evaluated via a billable video visit.      The patient has been notified of following:     \"This video visit will be conducted via a call between you and your physician/provider. We have found that certain health care needs can be provided without the need for an in-person physical exam.  This service lets us provide the care you need with a video conversation.  Video visits are billed at different rates depending on your insurance coverage.  Please reach out to your insurance provider with any questions.    If during the course of the call the physician/provider feels a video visit is not appropriate, you will not be charged for this service.\"    Patient has given verbal consent for Video visit? Yes  If you are dropped from the video visit, the video invite should be resent to: Send to e-mail at: [email or web address]  Will anyone else be joining your video visit? No    Video-Visit Details    Type of service:  Video Visit    Video Start Time: 12:38  Video End Time: 1:01    Originating Location (pt. Location): Home    Distant Location (provider location):   [facility]     Platform used for Video Visit: [employer]    RE: Familia Sales  MR#: [id number]  : 3/1/1931  DOS: Sep 28, 2020  NIDIA:  2020    Neuropsychology Laboratory  [address] [address] [phone]    NEUROPSYCHOLOGICAL CONSULTATION      REASON FOR REFERRAL:  Familia Sales is a 89 year old male with 10 years of education plus a GED and technical training. The patient was referred for a neuropsychological evaluation by Dr. Reinoso to assess his cognitive and emotional functioning secondary to memory difficulties. The evaluation was requested in order to document his current level of functioning, assist with the differential diagnosis and provide appropriate recommendations to the patient, family and treatment team. The patient was informed that " the evaluation included multiple measures of performance and symptom validity, and he was encouraged to provide his best effort at all times.  The nature of the neuropsychological evaluation was also discussed, including limits of confidentiality (for suspected child or elder abuse, potential homicide or suicide, and court orders). The patient was also informed that the report would be placed on the electronic medical records system.  The patient was also given an opportunity to ask questions. The patient indicated that he understood the information and consented to participate in the evaluation.    Due to circumstances that prevent in-person clinical visits, this assessment was conducted using telehealth methods (including remote audiovisual presentation of test instructions and test stimuli). The standard administration of these procedures involves in-person, face-to-face methods. The impact of applying non-standard administration methods has been evaluated only in part by scientific research. While every effort was made to simulate standard assessment practices, the diagnostic conclusions and recommendations for treatment provided in this report are being advanced with these reservations.    PROCEDURES:   Review of records and clinical interview  Mental Status-orientation, Wide Range Achievement Test-4, Wechsler Adult Intelligence Scale-IV, Reyna Verbal Learning Test-Revised, Clock Drawing Test, Verbal Fluency Test, Oral Trailmaking Test, BDAE Complex Ideational Material, Test of Sustained Attention and Tracking, [place] Naming Test (15 Item version), RBANS, Geriatric Depression Scale, Cat Anxiety Inventory and ILS Health and Safety Questions    REVIEW OF RECORDS: Medical records from 7/22/2020 noted that the patient has been experiencing memory difficulties over the past 2 years, but has become noticeably worse over the past several months.  Records indicated the patient had noticed forgetfulness, such as  "difficulty remembering names, how to operate computer programs, and how to complete i3 membrane projects.  Records noted the patient also reported \"intermittent left sided posterior headaches; irritability; depressed because of the above; recurrent dizziness and unsteady gait requiring walker all the time; denies nightmares or loss of sleep; denies wandering aimlessly; denies illegible writing.\"  The records indicated the patient denied any history of traumatic brain injury.  Records indicated that he is a former smoker and [employer] .  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  Records indicated the patient is being treated with acetaminophen, amoxicillin, aspirin, Celebrex, glucosamine-conduit, ferrous sulfate, hydrocortisone, ketoconazole, lidocaine, nitroglycerin, Patanol, and sertraline.     Records indicated that a CT scan of the brain was conducted on 8/4/2020 because an MRI could not be done. The report of the CT scan noted  There is diffuse parenchymal volume loss. White matter changes are present in the cerebral hemispheres that are consistent with small vessel ischemic disease in this age patient. There is no evidence of intracranial hemorrhage, mass, acute infarct or anomaly.      CLINICAL INTERVIEW: The patient was interviewed via video platform for this telehealth neuropsychological evaluation.  The patient was interviewed alone.  On interview, the patient confirmed he has concerns with his memory and he reported that he has \"slow recall\".  He also stated \"some days I do not remember much of anything.\"  The patient indicated that he will forget tasks his wife " "asked him to do, such as taking out the dog.  The patient reported that he has observed memory problems over the past 3 to 4 years which have gotten \"a little worse\" over time.  The patient also noted increased difficulty with word finding recently.  He also said that he has difficulty with attention and concentration, and often has to write down tasks to remember them.  However, the patient also said that he will forget to read these notes.  The patient denies difficulty with decision-making or problem-solving, but, per the patient, his wife thinks he has more difficulty with this.  Functionally, the patient reported that he drives and denied any difficulty with driving such as accidents, tickets, or getting lost.  The patient noted that his wife has always managed the household finances so this is not a change.  The patient denied difficulty with managing his own medications, and noted that he uses a pillbox to organize his medications.  He also denied difficulty with basic household chores or personal care activities.    In terms of his mood, he reported that it is \"down\" at times because of the COVID-19 pandemic.  However, he reported that he enjoys woodworking, and when he does it, this improves his mood.  He also noted that he and his wife are going on a vacation soon and he is looking forward to this.  The patient noted that most nights he sleeps 9 hours, although he had more difficulty last night.  He reported that he has sleep apnea and uses a CPAP machine.  The patient denied difficulty with appetite.  The patient reported that he has worked with both a psychiatrist and psychologist for anger problems, and [facility]  The patient denied any suicidal or homicidal ideation, denied any history of suicide attempts.  Further, he denied any history of hallucinations or delusions.  The patient denied any use of alcohol or illicit substances.  He reported that he stopped cigarette smoking in " 1964.    Medically, the patient noted significant problems with back pain since he was injured in basic training in 1952.  He noted that he has had 3 back surgeries, but still has pain.  He also noted pain problems from a recent hip replacement surgery that was not healing properly.  Additionally, the patient noted a significant history for arthritis, type 2 diabetes, coronary artery disease with 2 stents, bilateral hearing problems with hearing aids, and cataract removal last year. The patient attributed his hearing problems to working as an  in the past. The patient noted that he wears eyeglasses all the time.  He denied any history of traumatic brain injury with loss of consciousness.  Further, he denied any history multiple sclerosis, stroke, seizures, movement disorders, hypercholesterolemia, hypothyroidism, cancer, liver disease, or kidney disease.  The patient said that his medications were up-to-date in the electronic medical record.      The patient reported that he stopped high school in his moiz year and join the .  He reported that he earned a GED and then attended technical school, including  training.  The patient noted that he worked as an  and then as a .  The patient noted that he was an average student overall, although his grades depended on the subject.  The patient denied ever being in special education or having repeat a grade.  He stated that he has been  for 42 years and has a blended family.  He noted that he has 3 biological children, including 3 daughters, and 3 adopted children, including one son and 2 daughters.  He also noted that his wife has 3 surviving children.  The patient noted that he lives at home with his wife.  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    BEHAVIORAL OBSERVATIONS: On examination, the patient was casually but appropriately  dressed and groomed. The patient was cooperative with the evaluation and was talkative.  The patient appeared to put forth his best effort on all tasks. Thus, the results of this evaluation are a reasonably valid reflection of the patient s current level of functioning. There were no indications of hallucinations, delusions or unusual thought processes. His speech was appropriate in rate, tone, and volume. The patient was generally well oriented and his affect was somewhat constricted.     RESULTS: Formal performance validity measures were within expected limits and consistent with the above-noted observations.  Psychometric estimates of the patient's premorbid intellectual functioning based on single word reading ability were in the lower half of the average range, which is generally consistent with his educational and occupational history.  Thus, there was no evidence for significant decline in global cognitive functioning.    Measures of attention/concentration were mildly variable.  For example, a digit span task was in the average range.  Further, an oral sequencing task that integrates attention was within expected limits.  However, a timed measure of attention and processing speed was poorer than expected, indicating evidence for mildly slowed speed of information processing.    Measures of the patient's memory functioning were also mildly variable.  For example, there was evidence for mild inefficiency in encoding of new verbal information on a word list learning task.  However, the patient's performance on a more structured story memory task was better, and within expected limits.  Delayed recall and recognition were consistent with his initial encoding on these tasks.  Thus, there was no evidence for rapid forgetting of previously learned verbal information.  Visual memory could not be assessed due to the telehealth platform for this evaluation.    Expressive and receptive language functioning was broadly  within expected limits.  For example, confrontation naming was within expectancy.  Vocabulary ability was in the low average range.  Semantic and phonemic fluency were at the lower end of expected limits as well.  Receptive language functioning, as measured by a complex ideational material task, was well within expected limits.  Visual-spatial and visual constructional abilities were also well within expected limits.  For example, motor-free visual reasoning was in the average range.  Likewise, motor-free line orientation judgment was well within expected limits.  A clock drawing task did not indicate evidence for significant visual-spatial distortions.    Measures of the patient's executive functioning were also mildly variable.  For example, verbal and visual reasoning abilities were in the average range.  Verbal fluency was at the lower end of expected limits, and a clock drawing task did not indicate evidence for significant difficulty with planning and organization.  A complex oral sequencing task that integrates cognitive flexibility was slightly poorer than expected.  Further, a functional measure of basic health and safety awareness was poor than expected.    Assessment of the patient's emotional functioning was completed utilizing the clinical interview and self-report measures of depression and anxiety.  On the self-report measures, the patient noted mild symptoms of depression and anxiety, which is consistent with his report during the interview.    SUMMARY:  The following opinions and recommendations are based on the interview and formal testing data obtained via telephone, thus all results were interpreted with caution.  In summary, the neuropsychological assessment results indicated evidence for mild variability in attention, as well as mildly slowed speed of information processing.  The variable attention and slowed processing speed likely resulted in mild variability in encoding of new verbal  "information, but the patient performed better on more structured verbal memory task.  Further, there was no evidence for rapid forgetting of previously learned verbal information.  Expressive and receptive language functioning and visual-spatial abilities were generally within expected limits.  Executive functioning was mildly variable as well.  There was also evidence for mild depressive and anxiety symptoms, consistent with a depressive disorder, which is also consistent with the records.  While the etiology of the variability in cognitive functioning could not be completely determined based on this evaluation alone, the results indicated that a progressive dementing condition, such as Alzheimer's disease, is unlikely at this time.  More likely, the patient's depressive and anxiety symptoms are likely contributing to the variability in attention and memory, as well as slightly slowed processing speed, observed in this evaluation.  Other factors, such as his sleep apnea and chronic pain, could be contributing to his cognitive difficulties.    RECOMMENDATIONS:   1.  Given these results, continued psychiatric intervention is recommended for his emotional distress.  The patient indicated that he [facility], but this is \"on hold.\"  The patient strongly encouraged to reengage in psychiatric intervention.  2.  Further, returning to psychotherapeutic intervention is also recommended.  A highly structured cognitive behavioral intervention focused on coping and stress management likely would be the most helpful for him.  3. A suicide risk assessment was conducted with this patient, and it was determined that the patient likely was not an imminent danger to himself. Nonetheless, ongoing close monitoring of the patient s suicidal ideation by health care providers is recommended.   4. The patient may find the following attention and organizational strategies helpful:     Use " "cell phone reminders to help monitor upcoming appointments and due dates as well as other important tasks (e.g., when to take medications). Set the reminder to go off several times prior to the event with advanced notice (e.g., one week before, two days before, the day before, and the morning of the event).     He should attempt to reduce distractions at home. Having a clutter-free work environment and removing or at least making it harder to access potential distractions would help optimize his attention. He might also consider facing away from high traffic areas and using earplugs or noise cancellation headphones when desiring a quiet work environment.    Taking short breaks during her day may help optimize and maintain her concentration.     Make to-do lists and prioritize tasks. Break down large tasks into smaller steps and check off each small step when completed.   5. In order to promote learning and memorization of new verbal material, it is recommended that the patient add structure to the material he is learning to promote subsequent recall (e.g., use mnemonic devices, acronyms, make up a story or song). Additionally, he is encouraged to use visual aids, such as flash cards, diagrams, charts, etc.  6. Chronic pain can interfere with cognitive functioning in daily life. The following strategies can be helpful in minimizing the impact of pain on concentration and memory:    Get an adequate amount of restorative sleep each night     Practice mindfulness exercises often      Maintain a schedule that allows for sufficient time to engage in pleasurable activities.     A good self-help resource for managing pain and instruction in relaxation techniques is  Managing Pain Before it Manages You  by Tati Peraza M.D., Ph.D., [employer].     \"Chronic Pain Control Workbook: A Step-by-Step Guide for Coping with and Overcoming Pain\" By Emilia Mcguire and Kellen Ware. [employer]. " "    \"Chronic Pain Control Workbook: A step by step guide for coping with and overcoming pain\" by Emilia Mcguire and Kellen Ware. 1[employer]     LEE ANN Marie (1987). Mastering Pain: A Twelve-Step Program for Coping with Chronic Pain. [employer].     The following online resources can provide additional helpful information about pain management generally.     www.ninds.nih.gov/health_and_medical/disorders/chronic_pain.htm      American Pain Society at www.ampainsoc.org      International Association for the Study of Pain at www.iasp-pain.org     American Academy of Pain Medicine at www.painmed.org    7. A full medical evaluation of any treatable causes of cognitive changes is also recommended, if this has not already been accomplished.  Evaluation of any infectious processes, vitamin deficiencies or other metabolic abnormalities is recommended, to rule out these as contributors to his current concerns.  8.  The results indicated that the patient has at least adequate capacity for informed personal and medical decision making.  However, given the variability in cognitive functioning, family assistance with complex decision-making is recommended.  9. The results of the evaluation now constitute a baseline of the patient s cognitive functioning.  Given the evidence for variability in cognitive functioning, a referral for a neuropsychological reevaluation in approximately one year is recommended in order to clarify the differential diagnosis, document any changes in cognitive functioning, and provide further recommendations and prognosis to the patient, family and treatment team.    Results were provided in a telephone call to the patient on 9/28/2020 and his questions were answered. Thank you for referring this interesting individual. If you have any questions, please feel free to contact me.      Rowdy Mckeon, PhD, ABPP, LP  Professor and Licensed Psychologist [id number]  Board Certified " Clinical Neuropsychologist  Phone: [phone]  Fax: [phone]    Time Spent:      Minutes Date Code Units   Total Time-Neuropsychologist Professional 227 9/28/2020 67548 1     9/28/2020 99185 3   Neuropsychologist Admin/scoring 0 9/28/2020 06786 0     9/28/2020 41728 0   Diagnostic Interview 23 9/28/2020 00221 1   Psychometrician Time-Test Administration/Score 128 9/28/2020 15335 1     9/28/2020 78610 3   Diagnosis R41.3 F32.9 G47.33

## 2020-09-29 ENCOUNTER — TELEPHONE (OUTPATIENT)
Dept: FAMILY MEDICINE | Facility: CLINIC | Age: 85
End: 2020-09-29

## 2020-09-29 NOTE — PROGRESS NOTES
Received patient's neuropsych evaluation report.  Please schedule patient for a virtual visit to discuss further plans.

## 2020-09-29 NOTE — TELEPHONE ENCOUNTER
Per Dr Reinoso.  Received patient's neuropsych evaluation report.  Please schedule patient for a virtual visit to discuss further plans

## 2020-09-30 ENCOUNTER — TRANSFERRED RECORDS (OUTPATIENT)
Dept: HEALTH INFORMATION MANAGEMENT | Facility: CLINIC | Age: 85
End: 2020-09-30

## 2020-09-30 ENCOUNTER — VIRTUAL VISIT (OUTPATIENT)
Dept: FAMILY MEDICINE | Facility: CLINIC | Age: 85
End: 2020-09-30
Payer: MEDICARE

## 2020-09-30 DIAGNOSIS — R41.3 MEMORY DIFFICULTIES: Primary | ICD-10-CM

## 2020-09-30 PROCEDURE — 99213 OFFICE O/P EST LOW 20 MIN: CPT | Mod: 95 | Performed by: FAMILY MEDICINE

## 2020-09-30 ASSESSMENT — PATIENT HEALTH QUESTIONNAIRE - PHQ9: SUM OF ALL RESPONSES TO PHQ QUESTIONS 1-9: 1

## 2020-09-30 NOTE — PATIENT INSTRUCTIONS
Neuropsychology evaluation did not reveal evidence of progressive dementia.    It is recommended that your chronic conditions be managed optimally, particularly chronic pain.  Follow up with your ortho and pain specialists.    It was mentioned in the report to make sure that your advance care directives, health care directives, living will and other personal concerns are in order in case you experience progressive memory or cognitive decline.    Engage in activities that stimulate your memory and other mental processes.

## 2020-09-30 NOTE — PROGRESS NOTES
"Familia Sales is a 89 year old male who is being evaluated via a billable video visit.      The patient has been notified of following:     \"This video visit will be conducted via a call between you and your physician/provider. We have found that certain health care needs can be provided without the need for an in-person physical exam.  This service lets us provide the care you need with a video conversation.  If a prescription is necessary we can send it directly to your pharmacy.  If lab work is needed we can place an order for that and you can then stop by our lab to have the test done at a later time.    Video visits are billed at different rates depending on your insurance coverage.  Please reach out to your insurance provider with any questions.    If during the course of the call the physician/provider feels a video visit is not appropriate, you will not be charged for this service.\"    Patient has given verbal consent for Video visit? Yes  How would you like to obtain your AVS? Mail a copy  If you are dropped from the video visit, the video invite should be resent to: Send to e-mail at: [email or web address]  Will anyone else be joining your video visit? No    Subjective     Familia Sales is a 89 year old male who presents today via video visit for the following health issues:    HPI      Patient had neuropsych eval for memory changes. Results are:    RECOMMENDATIONS:   1.  Given these results, continued psychiatric intervention is recommended for his emotional distress.  The patient indicated that [facility] but [facility] is \"on [facility].\"  The patient strongly encouraged to reengage in psychiatric intervention.  2.  Further, returning to psychotherapeutic intervention is also recommended.  A highly structured cognitive behavioral intervention focused on coping and stress management likely would be the most helpful for him.  3. A suicide risk assessment was conducted " with this patient, and it was determined that the patient likely was not an imminent danger to himself. Nonetheless, ongoing close monitoring of the patient s suicidal ideation by health care providers is recommended.   4. The patient may find the following attention and organizational strategies helpful:   ? Use cell phone reminders to help monitor upcoming appointments and due dates as well as other important tasks (e.g., when to take medications). Set the reminder to go off several times prior to the event with advanced notice (e.g., one week before, two days before, the day before, and the morning of the event).     He should attempt to reduce distractions at home. Having a clutter-free work environment and removing or at least making it harder to access potential distractions would help optimize his attention. He might also consider facing away from high traffic areas and using earplugs or noise cancellation headphones when desiring a quiet work environment.    Taking short breaks during her day may help optimize and maintain her concentration.     Make to-do lists and prioritize tasks. Break down large tasks into smaller steps and check off each small step when completed.   5. In order to promote learning and memorization of new verbal material, it is recommended that the patient add structure to the material he is learning to promote subsequent recall (e.g., use mnemonic devices, acronyms, make up a story or song). Additionally, he is encouraged to use visual aids, such as flash cards, diagrams, charts, etc.  6. Chronic pain can interfere with cognitive functioning in daily life. The following strategies can be helpful in minimizing the impact of pain on concentration and memory:    Get an adequate amount of restorative sleep each night     Practice mindfulness exercises often      Maintain a schedule that allows for sufficient time to engage in pleasurable activities.     A good self-help resource for  "managing pain and instruction in relaxation techniques is  Managing Pain Before it Manages You  by Tati Peraza M.D., Ph.D., [employer].     \"Chronic Pain Control Workbook: A Step-by-Step Guide for Coping with and Overcoming Pain\" By Emilia Mcguire and Kellen Ware. [employer].     \"Chronic Pain Control Workbook: A step by step guide for coping with and overcoming pain\" by Emilia Mcguire and Kellen Ware. [employer]     LEE ANN Marie (1987). Mastering Pain: A Twelve-Step Program for Coping with Chronic Pain. [employer].     The following online resources can provide additional helpful information about pain management generally.     www.ninds.nih.gov/health_and_medical/disorders/chronic_pain.htm      American Pain Society at www.ampainsoc.org      International Association for the Study of Pain at www.iasp-pain.org     American Academy of Pain Medicine at www.painmed.org    7. A full medical evaluation of any treatable causes of cognitive changes is also recommended, if this has not already been accomplished.  Evaluation of any infectious processes, vitamin deficiencies or other metabolic abnormalities is recommended, to rule out these as contributors to his current concerns.  8.  The results indicated that the patient has at least adequate capacity for informed personal and medical decision making.  However, given the variability in cognitive functioning, family assistance with complex decision-making is recommended.  9. The results of the evaluation now constitute a baseline of the patient s cognitive functioning.  Given the evidence for variability in cognitive functioning, a referral for a neuropsychological reevaluation in approximately one year is recommended in order to clarify the differential diagnosis, document any changes in cognitive functioning, and provide further recommendations and prognosis to the patient, family and treatment " team.    Patient denies any new symptoms.  Just still experiences random forgetfulness of things he needs to do.      Video Start Time: 3:18 pm    Patient denies hx of syphilis, chronic infections, CVA or high risk behaviors when he was younger.    Patient Active Problem List   Diagnosis     Chronic iron deficiency anemia     Type 2 diabetes mellitus without complications (H)     Major depressive disorder, single episode, in partial remission (H)     TESSY (obstructive sleep apnea)     Regular astigmatism of both eyes     Mixed conductive and sensorineural hearing loss of both ears     Coronary artery disease involving native coronary artery of native heart without angina pectoris     History of DVT of lower extremity     Primary osteoarthritis of left hip     Lumbar radiculopathy     Memory difficulties     Past Surgical History:   Procedure Laterality Date     C ANESTH,LUMBAR SPINE,CORD SURGERY  10/28/97 & 98    L3 to sacrum with spinal fusion L4-L% and decompression L5-S1     C ANESTH,TOTAL KNEE ARTHROPLASTY  03    with revision left 04     C APPENDECTOMY  MAY 1949 OR      C FOOT/TOES SURGERY PROC UNLISTED  1989     HC DEST LOC LES CHOROID, PHOTOCOAG >=1 SESSION  06    LASIK     HERNIA REPAIR, UMBILICAL  06     SURGICAL HISTORY OF -   1959    CARTILAGE REMOVED FROM NOSE     SURGICAL HISTORY OF -   2008    LUMBAR DECOMPRESSION AT L2-3 AND L3-4     SURGICAL HISTORY OF -   09 THRU 3/06/09    CORTIZONE INJECTIOSN A SERIES OF 8     SURGICAL HISTORY OF -       INJECTION L3 NERVE ENDING       Social History     Tobacco Use     Smoking status: Former Smoker     Packs/day: 1.00     Years: 24.00     Pack years: 24.00     Types: Cigarettes     Last attempt to quit: 1964     Years since quittin.7     Smokeless tobacco: Never Used   Substance Use Topics     Alcohol use: Yes     Comment: very rare     Family History   Problem Relation Age of Onset      Gastrointestinal Disease Mother         ulcers     Cancer Mother         brain     Cancer Maternal Grandfather      Depression Daughter      Liver Disease Daughter      Diabetes Daughter          Current Outpatient Medications   Medication Sig Dispense Refill     acetaminophen (TYLENOL) 500 MG tablet Take 500-1,000 mg by mouth every 6 hours as needed for mild pain       amoxicillin (AMOXIL) 500 MG capsule Take 500 mg by mouth Take four capsules 1 hour before dental procedures.       aspirin 81 MG EC tablet Take 81 mg by mouth daily       celecoxib (CELEBREX) 100 MG capsule Take 100 mg by mouth 2 times daily as needed for pain       [employer]S GLUCOSAMINE-CHONDROIT-MSM PO 1 tablet daily       ferrous sulfate (FE TABS) 325 (65 Fe) MG EC tablet Take 325 mg by mouth daily       hydrocortisone 2.5 % cream Apply topically 2 times daily       ketoconazole (NIZORAL) 2 % external shampoo Shampoo scalp 3 times weekly       lidocaine (LIDODERM) 5 % patch Place 2 patches onto the skin every 24 hours To prevent lidocaine toxicity, patient should be patch free for 12 hrs daily.       nitroGLYcerin (NITROSTAT) 0.4 MG sublingual tablet Place 0.4 mg under the tongue every 5 minutes as needed for chest pain For chest pain place 1 tablet under the tongue every 5 minutes for 3 doses. If symptoms persist 5 minutes after 1st dose call 911.       olopatadine (PATANOL) 0.1 % ophthalmic solution Place 1 drop into both eyes 2 times daily prn       sertraline (ZOLOFT) 50 MG tablet Take 75 mg by mouth daily       Allergies   Allergen Reactions     Codeine Itching     Duloxetine      Dye [Contrast Dye]      ONE REACTION TO INJECTED DYE IN SHOULDER, ABOUT MAY 1965.     Morphine      Motrin [Ibuprofen Micronized] Itching     Tagamet Itching     Vicodin [Hydrocodone-Acetaminophen]      EXTREME SWEATING, FLUSHING AND LIGHT-HEADEDNESS.       Hay Fever & [A.R.M.]        Review of Systems   C: NEGATIVE for fever, chills, change in weight  I: NEGATIVE for  worrisome rashes, moles or lesions  E: NEGATIVE for vision changes or irritation  R: NEGATIVE for significant cough or SOB  CV: NEGATIVE for chest pain, palpitations or peripheral edema  GI: NEGATIVE for nausea, abdominal pain, heartburn, or change in bowel habits  : NEGATIVE for frequency, dysuria, or hematuria  M: NEGATIVE for significant arthralgias or myalgia  N: NEGATIVE for weakness, dizziness or paresthesias  E: NEGATIVE for temperature intolerance, skin/hair changes  H: NEGATIVE for bleeding problems      Objective           Vitals:  No vitals were obtained today due to virtual visit.    Physical Exam     GENERAL: Healthy, alert and no distress  EYES: Eyes grossly normal to inspection.  No discharge or erythema, or obvious scleral/conjunctival abnormalities.  RESP: No audible wheeze, cough, or visible cyanosis.  No visible retractions or increased work of breathing.    SKIN: Visible skin clear. No significant rash, abnormal pigmentation or lesions.  NEURO: Cranial nerves grossly intact.  Mentation and speech appropriate for age.  PSYCH: Mentation appears normal, affect normal/bright, judgement and insight intact, normal speech and appearance well-groomed.      No results found for this or any previous visit (from the past 24 hour(s)).        Assessment & Plan     Familia was seen today for results.    Diagnoses and all orders for this visit:    Memory difficulties          Patient Instructions   Neuropsychology evaluation did not reveal evidence of progressive dementia.    It is recommended that your chronic conditions be managed optimally, particularly chronic pain.  Follow up with your ortho and pain specialists.    It was mentioned in the report to make sure that your advance care directives, health care directives, living will and other personal concerns are in order in case you experience progressive memory or cognitive decline.    Engage in activities that stimulate your memory and other mental  processes.        Return in about 6 months (around 3/30/2021) for or sooner if with any change in memory or mental function..    Heriberto Reinoso MD  [facility]      Video-Visit Details    Type of service:  Video Visit    Video End Time:3:32 PM    Originating Location (pt. Location): Home    Distant Location (provider location):  [facility]     Platform used for Video Visit: [employer]

## 2020-11-04 ENCOUNTER — TRANSFERRED RECORDS (OUTPATIENT)
Dept: HEALTH INFORMATION MANAGEMENT | Facility: CLINIC | Age: 85
End: 2020-11-04

## 2021-03-19 ENCOUNTER — HOSPITAL ENCOUNTER (EMERGENCY)
Facility: CLINIC | Age: 86
Discharge: HOME | End: 2021-03-19
Attending: EMERGENCY MEDICINE | Admitting: EMERGENCY MEDICINE
Payer: MEDICARE

## 2021-03-19 ENCOUNTER — APPOINTMENT (OUTPATIENT)
Dept: CT IMAGING | Facility: CLINIC | Age: 86
End: 2021-03-19
Attending: EMERGENCY MEDICINE
Payer: MEDICARE

## 2021-03-19 ENCOUNTER — NURSE TRIAGE (OUTPATIENT)
Dept: FAMILY MEDICINE | Facility: CLINIC | Age: 86
End: 2021-03-19

## 2021-03-19 VITALS
WEIGHT: 200 LBS | RESPIRATION RATE: 20 BRPM | OXYGEN SATURATION: 97 % | TEMPERATURE: 98.2 F | SYSTOLIC BLOOD PRESSURE: 125 MMHG | DIASTOLIC BLOOD PRESSURE: 71 MMHG | HEART RATE: 64 BPM | BODY MASS INDEX: 29.11 KG/M2

## 2021-03-19 DIAGNOSIS — R20.2 PARESTHESIAS: ICD-10-CM

## 2021-03-19 PROBLEM — M15.9 GENERALIZED OSTEOARTHRITIS OF MULTIPLE SITES: Status: ACTIVE | Noted: 2021-03-19

## 2021-03-19 PROBLEM — G89.29 CHRONIC LOW BACK PAIN: Status: ACTIVE | Noted: 2021-03-19

## 2021-03-19 PROBLEM — R29.818 PARKINSONIAN FEATURES: Status: ACTIVE | Noted: 2017-05-18

## 2021-03-19 PROBLEM — M54.30 SCIATICA: Status: ACTIVE | Noted: 2021-03-19

## 2021-03-19 PROBLEM — I21.09 ACUTE MYOCARDIAL INFARCTION OF OTHER ANTERIOR WALL, INITIAL EPISODE OF CARE: Status: ACTIVE | Noted: 2021-03-19

## 2021-03-19 PROBLEM — D49.59 NEOPLASM OF PROSTATE: Status: ACTIVE | Noted: 2021-03-19

## 2021-03-19 PROBLEM — R97.20 HIGH PROSTATE SPECIFIC ANTIGEN (PSA): Status: ACTIVE | Noted: 2021-03-19

## 2021-03-19 PROBLEM — F41.9 ANXIETY: Status: ACTIVE | Noted: 2018-01-23

## 2021-03-19 PROBLEM — H01.009 BLEPHARITIS: Status: ACTIVE | Noted: 2021-03-19

## 2021-03-19 PROBLEM — E78.5 HYPERLIPIDEMIA: Status: ACTIVE | Noted: 2021-03-19

## 2021-03-19 PROBLEM — N20.0 NEPHROLITHIASIS: Status: ACTIVE | Noted: 2021-03-19

## 2021-03-19 PROBLEM — E83.52 HYPERCALCEMIA: Status: ACTIVE | Noted: 2021-03-19

## 2021-03-19 PROBLEM — M51.379 DEGENERATION OF LUMBAR OR LUMBOSACRAL INTERVERTEBRAL DISC: Status: ACTIVE | Noted: 2021-03-19

## 2021-03-19 PROBLEM — N40.0 HYPERTROPHY OF PROSTATE WITHOUT URINARY OBSTRUCTION AND OTHER LOWER URINARY TRACT SYMPTOMS (LUTS): Status: ACTIVE | Noted: 2021-03-19

## 2021-03-19 PROBLEM — F32.A DEPRESSIVE DISORDER: Status: ACTIVE | Noted: 2021-03-19

## 2021-03-19 PROBLEM — M54.50 CHRONIC LOW BACK PAIN: Status: ACTIVE | Noted: 2021-03-19

## 2021-03-19 PROBLEM — H35.30 AGE-RELATED MACULAR DEGENERATION: Status: ACTIVE | Noted: 2021-03-19

## 2021-03-19 LAB
ALBUMIN SERPL-MCNC: 3.9 G/DL (ref 3.4–5)
ALP SERPL-CCNC: 105 U/L (ref 40–150)
ALT SERPL W P-5'-P-CCNC: 27 U/L (ref 0–70)
ANION GAP SERPL CALCULATED.3IONS-SCNC: 9 MMOL/L (ref 3–14)
AST SERPL W P-5'-P-CCNC: 19 U/L (ref 0–45)
BASOPHILS # BLD AUTO: 0 10E9/L (ref 0–0.2)
BASOPHILS NFR BLD AUTO: 0.4 %
BILIRUB SERPL-MCNC: 0.4 MG/DL (ref 0.2–1.3)
BUN SERPL-MCNC: 22 MG/DL (ref 7–30)
CA-I SERPL ISE-MCNC: 5.2 MG/DL (ref 4.4–5.2)
CALCIUM SERPL-MCNC: 9.8 MG/DL (ref 8.5–10.1)
CHLORIDE SERPL-SCNC: 109 MMOL/L (ref 94–109)
CO2 SERPL-SCNC: 24 MMOL/L (ref 20–32)
CREAT SERPL-MCNC: 0.76 MG/DL (ref 0.66–1.25)
DIFFERENTIAL METHOD BLD: NORMAL
EOSINOPHIL # BLD AUTO: 0.2 10E9/L (ref 0–0.7)
EOSINOPHIL NFR BLD AUTO: 2.9 %
ERYTHROCYTE [DISTWIDTH] IN BLOOD BY AUTOMATED COUNT: 14.6 % (ref 10–15)
ERYTHROCYTE [SEDIMENTATION RATE] IN BLOOD BY WESTERGREN METHOD: 4 MM/H (ref 0–20)
GFR SERPL CREATININE-BSD FRML MDRD: 78 ML/MIN/{1.73_M2}
GLUCOSE SERPL-MCNC: 125 MG/DL (ref 70–99)
HCT VFR BLD AUTO: 44.4 % (ref 40–53)
HGB BLD-MCNC: 15 G/DL (ref 13.3–17.7)
IMM GRANULOCYTES # BLD: 0 10E9/L (ref 0–0.4)
IMM GRANULOCYTES NFR BLD: 0.3 %
LYMPHOCYTES # BLD AUTO: 2 10E9/L (ref 0.8–5.3)
LYMPHOCYTES NFR BLD AUTO: 29 %
MAGNESIUM SERPL-MCNC: 2.3 MG/DL (ref 1.6–2.3)
MCH RBC QN AUTO: 30.3 PG (ref 26.5–33)
MCHC RBC AUTO-ENTMCNC: 33.8 G/DL (ref 31.5–36.5)
MCV RBC AUTO: 90 FL (ref 78–100)
MONOCYTES # BLD AUTO: 0.7 10E9/L (ref 0–1.3)
MONOCYTES NFR BLD AUTO: 9.7 %
NEUTROPHILS # BLD AUTO: 4 10E9/L (ref 1.6–8.3)
NEUTROPHILS NFR BLD AUTO: 57.7 %
NRBC # BLD AUTO: 0 10*3/UL
NRBC BLD AUTO-RTO: 0 /100
PLATELET # BLD AUTO: 197 10E9/L (ref 150–450)
POTASSIUM SERPL-SCNC: 4.2 MMOL/L (ref 3.4–5.3)
PROT SERPL-MCNC: 7.2 G/DL (ref 6.8–8.8)
RBC # BLD AUTO: 4.95 10E12/L (ref 4.4–5.9)
SODIUM SERPL-SCNC: 142 MMOL/L (ref 133–144)
TROPONIN I SERPL-MCNC: 0.02 UG/L (ref 0–0.04)
TSH SERPL DL<=0.005 MIU/L-ACNC: 1.13 MU/L (ref 0.4–4)
WBC # BLD AUTO: 6.9 10E9/L (ref 4–11)

## 2021-03-19 PROCEDURE — 84443 ASSAY THYROID STIM HORMONE: CPT | Performed by: EMERGENCY MEDICINE

## 2021-03-19 PROCEDURE — 99285 EMERGENCY DEPT VISIT HI MDM: CPT | Mod: 25 | Performed by: EMERGENCY MEDICINE

## 2021-03-19 PROCEDURE — 80053 COMPREHEN METABOLIC PANEL: CPT | Performed by: EMERGENCY MEDICINE

## 2021-03-19 PROCEDURE — 93010 ELECTROCARDIOGRAM REPORT: CPT | Performed by: EMERGENCY MEDICINE

## 2021-03-19 PROCEDURE — 84484 ASSAY OF TROPONIN QUANT: CPT | Performed by: EMERGENCY MEDICINE

## 2021-03-19 PROCEDURE — 82330 ASSAY OF CALCIUM: CPT | Performed by: EMERGENCY MEDICINE

## 2021-03-19 PROCEDURE — 70450 CT HEAD/BRAIN W/O DYE: CPT | Mod: ME

## 2021-03-19 PROCEDURE — 85025 COMPLETE CBC W/AUTO DIFF WBC: CPT | Performed by: EMERGENCY MEDICINE

## 2021-03-19 PROCEDURE — 93005 ELECTROCARDIOGRAM TRACING: CPT | Performed by: EMERGENCY MEDICINE

## 2021-03-19 PROCEDURE — 83735 ASSAY OF MAGNESIUM: CPT | Performed by: EMERGENCY MEDICINE

## 2021-03-19 PROCEDURE — 85652 RBC SED RATE AUTOMATED: CPT | Performed by: EMERGENCY MEDICINE

## 2021-03-19 ASSESSMENT — ENCOUNTER SYMPTOMS
ABDOMINAL PAIN: 0
NAUSEA: 0
MYALGIAS: 1
PALPITATIONS: 0
WOUND: 0
VOMITING: 0
DIAPHORESIS: 1
SEIZURES: 0
TREMORS: 1
COUGH: 0
NECK STIFFNESS: 0
FEVER: 0
DIARRHEA: 0
TROUBLE SWALLOWING: 0
HEADACHES: 1
FATIGUE: 1
SORE THROAT: 0
WEAKNESS: 1
ACTIVITY CHANGE: 1
SHORTNESS OF BREATH: 1
DIZZINESS: 0
VOICE CHANGE: 0
CHEST TIGHTNESS: 0
NUMBNESS: 1
NERVOUS/ANXIOUS: 0
LIGHT-HEADEDNESS: 0
BACK PAIN: 1
SPEECH DIFFICULTY: 0
WHEEZING: 0
UNEXPECTED WEIGHT CHANGE: 1
RHINORRHEA: 0
CHILLS: 0
APPETITE CHANGE: 0
CONFUSION: 0
NECK PAIN: 0

## 2021-03-19 ASSESSMENT — VISUAL ACUITY
OS: 20/20;WITH CORRECTIVE LENSES
OD: 20/25;WITH CORRECTIVE LENSES

## 2021-03-19 NOTE — TELEPHONE ENCOUNTER
"  Reason for Disposition    MODERATE difficulty breathing (e.g., speaks in phrases, SOB even at rest, pulse 100-120) of new onset or worse than normal    Additional Information    Negative: Breathing stopped and hasn't returned    Negative: Choking on something    Negative: SEVERE difficulty breathing (e.g., struggling for each breath, speaks in single words, pulse > 120)    Negative: Bluish (or gray) lips or face    Negative: Difficult to awaken or acting confused (e.g., disoriented, slurred speech)    Negative: Passed out (i.e., fainted, collapsed and was not responding)    Negative: Wheezing started suddenly after medicine, an allergic food, or bee sting    Negative: Stridor    Negative: Slow, shallow and weak breathing    Negative: Sounds like a life-threatening emergency to the triager    Negative: Chest pain    Negative: Wheezing (high pitched whistling sound) and previous asthma attacks or use of asthma medicines    Negative: Difficulty breathing and only present when coughing    Negative: Difficulty breathing and only from stuffy or runny nose    Answer Assessment - Initial Assessment Questions  1. RESPIRATORY STATUS: \"Describe your breathing?\" (e.g., wheezing, shortness of breath, unable to speak, severe coughing)       Shortness of breath with exertion.  2. ONSET: \"When did this breathing problem begin?\"       For one week.  3. PATTERN \"Does the difficult breathing come and go, or has it been constant since it started?\"      Relieves at rest.  4. SEVERITY: \"How bad is your breathing?\" (e.g., mild, moderate, severe)     - MILD: No SOB at rest, mild SOB with walking, speaks normally in sentences, can lay down, no retractions, pulse < 100.     - MODERATE: SOB at rest, SOB with minimal exertion and prefers to sit, cannot lie down flat, speaks in phrases, mild retractions, audible wheezing, pulse 100-120.     - SEVERE: Very SOB at rest, speaks in single words, struggling to breathe, sitting hunched forward, " "retractions, pulse > 120       moderate  5. RECURRENT SYMPTOM: \"Have you had difficulty breathing before?\" If so, ask: \"When was the last time?\" and \"What happened that time?\"       no  6. CARDIAC HISTORY: \"Do you have any history of heart disease?\" (e.g., heart attack, angina, bypass surgery, angioplasty)      CAD  7. LUNG HISTORY: \"Do you have any history of lung disease?\"  (e.g., pulmonary embolus, asthma, emphysema)      yes  8. CAUSE: \"What do you think is causing the breathing problem?\"       anemia  9. OTHER SYMPTOMS: \"Do you have any other symptoms? (e.g., dizziness, runny nose, cough, chest pain, fever)      Yes, tingly all over.  Tingle from top of head to legs.  Teeth chatter and light-headed with exertion.  Denies chest pain or pressure.  10. PREGNANCY: \"Is there any chance you are pregnant?\" \"When was your last menstrual period?\"        NA  11. TRAVEL: \"Have you traveled out of the country in the last month?\" (e.g., travel history, exposures)        No    Protocols used: BREATHING DIFFICULTY-A-[place]    "

## 2021-03-19 NOTE — TELEPHONE ENCOUNTER
Reason for call:    Symptom or request:     Patient called stating that he is dizzy, sob times 1 week ; reports that he is tingly all over.  He is congested.and cutting out medications      Best Time:  any    Can we leave a detailed message on this number?  YES     Mayela VERDUZCO  Station

## 2021-03-19 NOTE — ED PROVIDER NOTES
History     Chief Complaint   Patient presents with     Shaking     Pt states that he has      HPI  Familia Sales is a 89 year old male with history of type 2 diabetes, iron deficiency anemia, depression, obstructive sleep apnea, coronary disease with previous stents, presented for evaluation of several weeks of of various symptoms including diffuse tingling over her entire body, blurry vision, and headaches.  Patient reports he believes symptoms of been present for at least 2 weeks although possibly more prominent over the past week.  Does not recall an exact or discrete time of onset.  Patient reports a diffuse tingling sensation in his entire body including all extremities, chest, abdomen, and head.  Patient reports this tingling sensation is present persistently at the night with no significant change in intensity.  Denies any focal weakness but does report some global weakness as well as occasional muscle pain in his calves bilaterally.  Denies any swelling in the legs.  Patient reports persistent mild blurry vision although admits he has not had his eyes checked recently and is due for an eye exam in April.  Patient reports his blurry vision is symmetric.  Patient also reports left sided headaches.  He reports a constant dull pressure sensation in the top part of the left anterior part of his head with occasional brief sharp pains lasting seconds and resolving.  Patient ports the head discomfort is relatively constant in this area.  Does report a mild tremor which may be slightly more prominent recently but has been present for a long time.  Denies any regular difficulty with dropping objects but does report he occasionally will drop a spoon or something similar.  No significant change with this recently.  Does report a global weakness in his legs and arms but does not notice any asymmetry to this.  Does report being unsteady on his feet and feels that he is close to falling frequently but uses a walker and  has not fallen recently.  Patient does report that if it were not for his walker, he likely would have fallen recently.  Patient reports he still eating and drinking but has lost about 6 pounds over the past 6 or so months without intending to lose weight.  Does report occasional mild sweating at night but denies any drenching night sweats.  Denies any new urinary or bowel symptoms.  No nausea or vomiting.  Denies chest pain, diaphoresis, or nausea.  Does report some dyspnea intermittently most prominent when he bends over to put on his shoes.  Dyspnea improves when sitting upright or standing.  Denies significant dyspnea with exertion.  Denies cough, fevers, or chills.  No known sick contacts.      ==================================================================    CHART REVIEW:      RN Triage note today:     Reason for Disposition    MODERATE difficulty breathing (e.g., speaks in phrases, SOB even at rest, pulse 100-120) of new onset or worse than normal    END CHART REVIEW  ==================================================================      Allergies:  Allergies   Allergen Reactions     Codeine Itching     Duloxetine      Dye [Contrast Dye]      ONE REACTION TO INJECTED DYE IN SHOULDER, ABOUT MAY 1965.     Morphine      Motrin [Ibuprofen Micronized] Itching     Tagamet Itching     Vicodin [Hydrocodone-Acetaminophen]      EXTREME SWEATING, FLUSHING AND LIGHT-HEADEDNESS.       Hay Fever & [A.R.M.]        Problem List:    Patient Active Problem List    Diagnosis Date Noted     Hyperlipidemia 03/19/2021     Priority: Medium     Acute myocardial infarction of other anterior wall, initial episode of care 03/19/2021     Priority: Medium     Formatting of this note might be different from the original.  Created by Conversion       Age-related macular degeneration 03/19/2021     Priority: Medium     Blepharitis 03/19/2021     Priority: Medium     Nephrolithiasis 03/19/2021     Priority: Medium     Degeneration of  lumbar or lumbosacral intervertebral disc 03/19/2021     Priority: Medium     Formatting of this note might be different from the original.  Created by Conversion       Depressive disorder 03/19/2021     Priority: Medium     Jul 27, 2018 Entered By: LICO MCHUGH Comment: Dr. Estrella, psychiatrist,       Generalized osteoarthritis of multiple sites 03/19/2021     Priority: Medium     Formatting of this note might be different from the original.  Created by Conversion       High prostate specific antigen (PSA) 03/19/2021     Priority: Medium     Mar 24, 2016 Entered By: JACQUIE KENNEDY Comment: 2/16' see by  who will cont to moniter for now.       Hypercalcemia 03/19/2021     Priority: Medium     Hypertrophy of prostate without urinary obstruction and other lower urinary tract symptoms (LUTS) 03/19/2021     Priority: Medium     Feb 14, 2019 Entered By: LICO MCHUGH Comment: 3/2017       Neoplasm of prostate 03/19/2021     Priority: Medium     May 05, 2015 Entered By: JACQUIE KENNEDY Comment: was seen by  who recommended annual PSA and SHAHRZAD       Chronic low back pain 03/19/2021     Priority: Medium     Jan 31, 2012 Entered By: JACQUIE KENNEDY Comment: has had two back surgeries in [place], has had back injection w/Jan 31, 2012 Entered By: JACQUIE KENNEDY Comment: limited response had spinal stim placed 3/2011 w/goodJan 31, 2012 Entered By: JACQUIE KENNEDY Comment: response       Paresthesia of lower extremity 03/19/2021     Priority: Medium     Sciatica 03/19/2021     Priority: Medium     Formatting of this note might be different from the original.  Created by Conversion       Memory difficulties 09/30/2020     Priority: Medium     Chronic iron deficiency anemia 07/22/2020     Priority: Medium     Type 2 diabetes mellitus without complications (H) 07/22/2020     Priority: Medium     Major depressive disorder, single episode, in partial remission (H) 07/22/2020     Priority: Medium     TESSY  (obstructive sleep apnea) 07/22/2020     Priority: Medium     Regular astigmatism of both eyes 07/22/2020     Priority: Medium     Mixed conductive and sensorineural hearing loss of both ears 07/22/2020     Priority: Medium     Coronary artery disease involving native coronary artery of native heart without angina pectoris 07/22/2020     Priority: Medium     History of DVT of lower extremity 07/22/2020     Priority: Medium     Primary osteoarthritis of left hip 07/22/2020     Priority: Medium     Lumbar radiculopathy 07/22/2020     Priority: Medium     Anxiety 01/23/2018     Priority: Medium     Parkinsonian features 05/18/2017     Priority: Medium     Pre-syncope 07/21/2014     Priority: Medium     Degenerative spinal arthritis 10/13/2010     Priority: Medium     Formatting of this note might be different from the original.  L2-L3       Allergic rhinitis 09/08/2009     Priority: Medium        Past Medical History:    History reviewed. No pertinent past medical history.    Past Surgical History:    Past Surgical History:   Procedure Laterality Date     C ANESTH,LUMBAR SPINE,CORD SURGERY  10/28/97 & 09/21/98    L3 to sacrum with spinal fusion L4-L% and decompression L5-S1     C ANESTH,TOTAL KNEE ARTHROPLASTY  04/16/03    with revision left 08/02/04     C APPENDECTOMY  MAY 1949 OR 1950     C FOOT/TOES SURGERY PROC UNLISTED  MARCH 1989     HC DEST LOC LES CHOROID, PHOTOCOAG >=1 SESSION  12/13/06    LASIK     HERNIA REPAIR, UMBILICAL  09/19/06     SURGICAL HISTORY OF -   APRIL 1959    CARTILAGE REMOVED FROM NOSE     SURGICAL HISTORY OF -   08/27/2008    LUMBAR DECOMPRESSION AT L2-3 AND L3-4     SURGICAL HISTORY OF -   1/20/09 THRU 3/06/09    CORTIZONE INJECTIOSN A SERIES OF 8     SURGICAL HISTORY OF -   8/09    INJECTION L3 NERVE ENDING       Family History:    Family History   Problem Relation Age of Onset     Gastrointestinal Disease Mother         ulcers     Cancer Mother         brain     Cancer Maternal Grandfather       Depression Daughter      Liver Disease Daughter      Diabetes Daughter        Social History:  Marital Status:   [2]  Social History     Tobacco Use     Smoking status: Former Smoker     Packs/day: 1.00     Years: 24.00     Pack years: 24.00     Types: Cigarettes     Quit date: 1964     Years since quittin.2     Smokeless tobacco: Never Used   Substance Use Topics     Alcohol use: Yes     Comment: very rare     Drug use: No        Medications:    acetaminophen (TYLENOL) 500 MG tablet  amoxicillin (AMOXIL) 500 MG capsule  aspirin 81 MG EC tablet  celecoxib (CELEBREX) 100 MG capsule  [employer] GLUCOSAMINE-CHONDROIT-MSM PO  ferrous sulfate (FE TABS) 325 (65 Fe) MG EC tablet  hydrocortisone 2.5 % cream  ketoconazole (NIZORAL) 2 % external shampoo  lidocaine (LIDODERM) 5 % patch  nitroGLYcerin (NITROSTAT) 0.4 MG sublingual tablet  olopatadine (PATANOL) 0.1 % ophthalmic solution  sertraline (ZOLOFT) 50 MG tablet          Review of Systems   Constitutional: Positive for activity change (Decreased), diaphoresis (Occasional, mild at night), fatigue and unexpected weight change (About 6 pounds over the past 6 months). Negative for appetite change, chills and fever.   HENT: Negative for congestion, postnasal drip, rhinorrhea, sore throat, trouble swallowing and voice change.    Eyes: Positive for visual disturbance (Slight bilateral blurring of vision).   Respiratory: Positive for shortness of breath (When bending forward, no significant dyspnea at rest or exertion). Negative for cough, chest tightness and wheezing.    Cardiovascular: Negative for chest pain, palpitations and leg swelling.   Gastrointestinal: Negative for abdominal pain, diarrhea, nausea and vomiting.   Genitourinary: Negative for decreased urine volume and enuresis.   Musculoskeletal: Positive for back pain (Chronic, at baseline), gait problem (Unsteady gait at baseline, possibly slightly worse over the past 3 weeks) and myalgias (Intermittent  in the legs bilaterally). Negative for neck pain and neck stiffness.   Skin: Negative for rash and wound.   Neurological: Positive for tremors (Mild), weakness (Global), numbness (Tingling over entire body) and headaches. Negative for dizziness, seizures, speech difficulty and light-headedness.   Psychiatric/Behavioral: Negative for confusion. The patient is not nervous/anxious.    All other systems reviewed and are negative.      Physical Exam   BP: (!) 143/67  Pulse: 61  Temp: 98.7  F (37.1  C)  Resp: 18  Weight: 90.7 kg (200 lb)  SpO2: 97 %      Physical Exam  Vitals signs and nursing note reviewed.   Constitutional:       Appearance: Normal appearance. He is not ill-appearing or diaphoretic.   HENT:      Head: Atraumatic.      Nose: Nose normal.      Mouth/Throat:      Mouth: Mucous membranes are moist.   Eyes:      Extraocular Movements: Extraocular movements intact.      Conjunctiva/sclera: Conjunctivae normal.      Pupils: Pupils are equal, round, and reactive to light.   Neck:      Musculoskeletal: Normal range of motion.   Cardiovascular:      Rate and Rhythm: Normal rate and regular rhythm.      Pulses: Normal pulses.      Heart sounds: No murmur.   Pulmonary:      Effort: Pulmonary effort is normal.      Breath sounds: Normal breath sounds.   Abdominal:      Palpations: Abdomen is soft.      Tenderness: There is no abdominal tenderness. There is no guarding.   Musculoskeletal:      Right lower leg: Edema present.      Left lower leg: Edema present.      Comments: Mild bilateral pitting edema   Skin:     General: Skin is warm and dry.      Capillary Refill: Capillary refill takes less than 2 seconds.   Neurological:      Mental Status: He is alert and oriented to person, place, and time.      Cranial Nerves: No cranial nerve deficit.      Sensory: No sensory deficit.      Motor: Weakness (Global strength slightly reduced but symmetric) present.      Coordination: Coordination normal (Relatively normal  finger-to-nose and heel-to-shin exam.).   Psychiatric:         Mood and Affect: Mood normal.         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, no acute ischemic abnormalities, no old available for comparison             Results for orders placed or performed during the hospital encounter of 03/19/21 (from the past 24 hour(s))   CBC with platelets differential   Result Value Ref Range    WBC 6.9 4.0 - 11.0 10e9/L    RBC Count 4.95 4.4 - 5.9 10e12/L    Hemoglobin 15.0 13.3 - 17.7 g/dL    Hematocrit 44.4 40.0 - 53.0 %    MCV 90 78 - 100 fl    MCH 30.3 26.5 - 33.0 pg    MCHC 33.8 31.5 - 36.5 g/dL    RDW 14.6 10.0 - 15.0 %    Platelet Count 197 150 - 450 10e9/L    Diff Method Automated Method     % Neutrophils 57.7 %    % Lymphocytes 29.0 %    % Monocytes 9.7 %    % Eosinophils 2.9 %    % Basophils 0.4 %    % Immature Granulocytes 0.3 %    Nucleated RBCs 0 0 /100    Absolute Neutrophil 4.0 1.6 - 8.3 10e9/L    Absolute Lymphocytes 2.0 0.8 - 5.3 10e9/L    Absolute Monocytes 0.7 0.0 - 1.3 10e9/L    Absolute Eosinophils 0.2 0.0 - 0.7 10e9/L    Absolute Basophils 0.0 0.0 - 0.2 10e9/L    Abs Immature Granulocytes 0.0 0 - 0.4 10e9/L    Absolute Nucleated RBC 0.0    Comprehensive metabolic panel   Result Value Ref Range    Sodium 142 133 - 144 mmol/L    Potassium 4.2 3.4 - 5.3 mmol/L    Chloride 109 94 - 109 mmol/L    Carbon Dioxide 24 20 - 32 mmol/L    Anion Gap 9 3 - 14 mmol/L    Glucose 125 (H) 70 - 99 mg/dL    Urea Nitrogen 22 7 - 30 mg/dL    Creatinine 0.76 0.66 - 1.25 mg/dL    GFR  Estimate 78 >60 mL/min/[1.73_m2]    GFR Estimate If Black 90 >60 mL/min/[1.73_m2]    Calcium 9.8 8.5 - 10.1 mg/dL    Bilirubin Total 0.4 0.2 - 1.3 mg/dL    Albumin 3.9 3.4 - 5.0 g/dL    Protein Total 7.2 6.8 - 8.8 g/dL    Alkaline Phosphatase 105 40 - 150 U/L    ALT 27 0 - 70 U/L    AST 19 0 - 45 U/L   TSH with free T4 reflex   Result Value Ref Range    TSH 1.13 0.40 - 4.00 mU/L   Calcium ionized   Result Value Ref Range    Calcium Ionized 5.2 4.4 - 5.2 mg/dL   Erythrocyte sedimentation rate auto   Result Value Ref Range    Sed Rate 4 0 - 20 mm/h   Magnesium   Result Value Ref Range    Magnesium 2.3 1.6 - 2.3 mg/dL   Troponin I   Result Value Ref Range    Troponin I ES 0.024 0.000 - 0.045 ug/L   Head CT w/o contrast    Narrative    EXAM: CT HEAD W/O CONTRAST  LOCATION: [facility]  DATE/TIME: 3/19/2021 5:51 PM    INDICATION: Dizziness, non-specific. Headache, new or worsening (Age >= 50y).  COMPARISON: None.  TECHNIQUE: Routine CT Head without IV contrast. Multiplanar reformats. Dose reduction techniques were used.    FINDINGS:  INTRACRANIAL CONTENTS: No intracranial hemorrhage, extraaxial collection, or mass effect.  No CT evidence of acute infarct. Mild to moderate presumed chronic small vessel ischemic changes. Moderate generalized volume loss. No hydrocephalus. Position of   cerebellar tonsils is satisfactory. Sella appears within normal limits. Physiologic mineralization right basal ganglia. Mild vascular calcification.     VISUALIZED ORBITS/SINUSES/MASTOIDS: Procedural changes both globes with no acute orbital process. Mild polypoid membrane thickening left maxillary sinus and right paramedian sphenoid sinus region. No middle ear or mastoid effusion.    BONES/SOFT TISSUES: Demineralization of skull base. Degenerative changes both TMJs is mild. No fracture of the calvarium is noted. No significant swelling of the facial or scalp level is noted.      Impression    IMPRESSION:  1.  No acute process  intracranially. Please see above for details and full description.       Medications - No data to display    6:17 PM Patient re-assessed: Patient updated regarding her reassuring labs and CT.  Exact cause of symptoms unclear.  Encouraged close primary care follow-up.     Assessments & Plan (with Medical Decision Making)  89-year-old male with type 2 diabetes, iron deficiency anemia, depression, and coronary disease sent for evaluation of several weeks of various nonspecific symptoms including diffuse tingling, blurry vision and intermittent headaches.  Patient well-appearing in no distress with no fevers or focal neurologic deficit in the ED.  Symptoms atypical for acute CVA and patient has an embedded nerve stimulator so cannot have an MRI for further diagnostic evaluation of the brain.  Had symptoms could be diabetic neuropathy related although he is not having significant pain.  Could be medication adverse effects.  Labs including electrolytes all reassuringly normal with only mildly elevated glucose.  Exact cause of his symptoms unclear but did not appear acutely dangerous.  Encouraged close primary care follow-up to discuss consideration for further work-up and management.     I have reviewed the nursing notes.    I have reviewed the findings, diagnosis, plan and need for follow up with the patient.       New Prescriptions    No medications on file       Final diagnoses:   Paresthesias       3/19/2021   [facility] EMERGENCY DEPT     Espinal, Earnest Hedrick MD  03/19/21 [id number]

## 2021-03-19 NOTE — ED NOTES
States he has had tingling all over his body x 1 week. Also gets shooting pains in his head that lasts about a second. Not having them now. Also states vision blurry x 1 week. [facility] ago. Had MI and stents placed in 2/2014. Diabetic.

## 2021-05-26 ENCOUNTER — RECORDS - HEALTHEAST (OUTPATIENT)
Dept: ADMINISTRATIVE | Facility: CLINIC | Age: 86
End: 2021-05-26

## 2021-05-27 ENCOUNTER — RECORDS - HEALTHEAST (OUTPATIENT)
Dept: ADMINISTRATIVE | Facility: CLINIC | Age: 86
End: 2021-05-27

## 2021-05-31 VITALS — WEIGHT: 201 LBS | BODY MASS INDEX: 28.84 KG/M2

## 2021-05-31 VITALS — BODY MASS INDEX: 29.06 KG/M2 | HEIGHT: 70 IN | WEIGHT: 203 LBS

## 2021-05-31 VITALS — WEIGHT: 205 LBS | BODY MASS INDEX: 29.41 KG/M2

## 2021-05-31 VITALS — WEIGHT: 201.3 LBS | BODY MASS INDEX: 28.88 KG/M2

## 2021-05-31 VITALS — WEIGHT: 202 LBS | BODY MASS INDEX: 28.98 KG/M2

## 2021-05-31 VITALS — BODY MASS INDEX: 28.98 KG/M2 | WEIGHT: 202 LBS

## 2021-05-31 VITALS — WEIGHT: 206 LBS | BODY MASS INDEX: 29.56 KG/M2

## 2021-05-31 VITALS — WEIGHT: 202.4 LBS | HEIGHT: 70 IN | BODY MASS INDEX: 28.98 KG/M2

## 2021-05-31 VITALS — BODY MASS INDEX: 29.56 KG/M2 | WEIGHT: 206 LBS

## 2021-05-31 NOTE — TELEPHONE ENCOUNTER
----- Message from Andrew Weber sent at 8/15/2019  1:39 PM CDT -----  Contact: Pt  General phone call:    Caller: Pt    Primary cardiologist: Dr Breaux     Detailed reason for call: Pt to have hip surgery - TBD possibly end of Sept. - Pt was asked to check with Dr Breaux and see if he would need to be seen prior - Pt would like a call back -    (Pt states he had an [facility]CHO at the [facility] done in January FYI)    New or active symptoms? Na    Best phone number: [phone]  Best time to contact: any  Ok to leave a detailed message? yes  Device? no    Additional Info:

## 2021-05-31 NOTE — TELEPHONE ENCOUNTER
Dr. Breaux,    Patient reports he is going through the process of being approved for [facility] at the [facility]. The date is TBD, but most likely towards the end of September. Patient was advised to contact you for your input.     Should he be seen or does he require any cardiac testing prior. Patient last seen by you in 6/2019 with plans to follow-up in 1 year. Patient also states he had [facility] in 1/2019 and will make results available for you to review. -ejb

## 2021-05-31 NOTE — TELEPHONE ENCOUNTER
Called patient and reviewed response/recommendations from [facility]. Patient verbalized understanding and agreement with plan.   Order for [facility] Pharm Stress Test placed.  Patient transferred to scheduling line to arrange. -mary    ----- Message -----  From: Henrik Breaux MD  Sent: 8/16/2019  10:49 AM  To: Maribell Lizarraga RN    Since his stents were placed quite some time ago and it has been approximately 3 years since he has had any type of ischemic evaluation, would advocate a regadenoson on nuclear perfusion study in advance of surgery.  Patient is willing, let us go ahead and set that up.  Thanks.

## 2021-06-01 VITALS — BODY MASS INDEX: 28.35 KG/M2 | HEIGHT: 70 IN | WEIGHT: 198 LBS

## 2021-06-02 ENCOUNTER — RECORDS - HEALTHEAST (OUTPATIENT)
Dept: ADMINISTRATIVE | Facility: CLINIC | Age: 86
End: 2021-06-02

## 2021-06-02 VITALS — BODY MASS INDEX: 28.97 KG/M2 | WEIGHT: 201.9 LBS

## 2021-06-03 VITALS — HEIGHT: 70 IN | WEIGHT: 202 LBS | BODY MASS INDEX: 28.92 KG/M2

## 2021-06-09 ENCOUNTER — RECORDS - HEALTHEAST (OUTPATIENT)
Dept: ADMINISTRATIVE | Facility: CLINIC | Age: 86
End: 2021-06-09

## 2021-06-10 NOTE — PROGRESS NOTES
Persons accompanying you (the patient) today: Himself    How have you been doing since we saw you last? Please note any concerns.  F/u apt, pt feels he has a new diagnosis lewy body dementia.    Please list any surgeries or procedures you have had since we saw you last:  None    Have you had any falls since your last visit? Yes: 6    Do you have any pain today? Yes:     With whom do you currently reside? (alone, spouse, family, assisted living, nursing home)  With Wife in single family home   If assisted living or nursing home, please note name and location of facility:

## 2021-06-10 NOTE — PROGRESS NOTES
Assessment:     1. SOB  2. Mild cognitive impairment  3. Dizziness  4. Orthostatic B/P    Plan:     Sent to [facility] for further evaluation      Subjective:        Patient presented to memory loss clinic to ask if there was any advancement in his cognitive impairment. When :MA took the patient's B/P it was 212/88 sitting 147/77 standing  I was questioning the patient and he suddenly was pale and diaphoretic. I  Retook B/P 163/85. The patient reported that he had a hard time breathing. He also had tingling down both arms. Patient has a history of CAD, 2 previous MI with stents placed, anxiety/depression, and diabetes. He reported that he felt dizzy and confused since he got up this morning. No chest pain. Patient reported increased SOB, internal code 9 called. House doctor assessed and agreed that patient should be sent for further testing. Wife was called and updated.     Patient Active Problem List    Diagnosis Date Noted     Chest pain 07/21/2014     Right shoulder pain 07/21/2014     CAD (coronary artery disease) 07/21/2014     Pre-syncope 07/21/2014     Acute [id number] Myocardial Infarction - Initial Care ([place])      Coronary Artery Disease      Peripheral Vascular Disease      Male Erectile Disorder      Lumbar Disc Degeneration      Sciatica      Intervertebral Disc Degeneration      Generalized Osteoarthritis Of Multiple Sites      Past Medical History:   Diagnosis Date     Arthritis      Back pain      Chest pain      Coronary artery disease      Diabetes mellitus     type II     Hearing loss of both ears      Heart attack 2/14/2014     Past Surgical History:   Procedure Laterality Date     APPENDECTOMY  1950     CARPAL TUNNEL RELEASE Right 10/29/2014     CORONARY STENT PLACEMENT  4/30/2014     decompression l3       EYE SURGERY       FOOT SURGERY       HERNIA REPAIR       REPLACEMENT TOTAL KNEE       spinal fusion L4-L5       Family History   Problem Relation Age of Onset     Cancer Mother       Cancer Father      Current Outpatient Prescriptions   Medication Sig Dispense Refill     acetaminophen (TYLENOL) 500 MG tablet Take 1,000 mg by mouth every 6 (six) hours as needed for pain.        aspirin 81 MG tablet Take 81 mg by mouth daily.       coQ10, liposomal ubiquinol, 8 mg/mL Liqd Take 10 mL by mouth.       DULoxetine (CYMBALTA) 30 MG capsule Take 1 capsule (30 mg total) by mouth daily. 90 capsule 3     fexofenadine (ALLEGRA) 180 MG tablet Take 180 mg by mouth daily as needed.        GARLIC ORAL Take 10 mg by mouth daily.       GLUC/SOO-MSM#1/VIT C/CLAUDETTE/BOR (GLUCOSAMINE-CHOND-MSM COMPLEX ORAL) Take 1 each by mouth daily.       MV-MN/FA/LYCOPENE/LUT/HB#185 (DIABETIC SUPPORT FORMULA ORAL) Take 1 Package by mouth once daily. Takes a diabetic vitamin/supplement thomas- gets at [facility]       naproxen (NAPROSYN) 500 MG tablet Take 1 tablet (500 mg total) by mouth 3 (three) times a day with meals. 90 tablet 3     nitroglycerin (NITROSTAT) 0.4 MG SL tablet Place 1 tablet (0.4 mg total) under the tongue every 5 (five) minutes as needed for chest pain. 25 tablet 3     NITROSTAT 0.4 mg SL tablet DISSOLVE 1 TABLET UNDER THE TONGUE EVERY 5 MINUTES AS NEEDED FOR CHEST PAIN 25 tablet 2     omeprazole (PRILOSEC) 40 MG capsule Take 40 mg by mouth as needed. Before a meal       penicillin VK (PEN VK) 500 MG tablet        sildenafil (VIAGRA) 50 MG tablet Take 50 mg by mouth daily as needed for erectile dysfunction.       triamcinolone (KENALOG) 0.1 % cream Apply a small amount and rub into your left foot/ankle eczema BID.  Do not share!!  Do not use elsewhere!! 30 g 0     VIT C/VIT E/LUTEIN/MIN/OMEGA-3 (OCUVITE ORAL) Take 1 capsule by mouth daily.       No current facility-administered medications for this encounter.        Allergies   Allergen Reactions     Codeine Itching     And sweating     Ibuprofen Itching     Morphine      Excessive sweating     Tagamet [Cimetidine]      Vicodin [Hydrocodone-Acetaminophen] Other (See  Comments)     Profuse sweating     Social History     Social History     Marital status:      Spouse name: N/A     Number of children: N/A     Years of education: N/A     Occupational History     Not on file.     Social History Main Topics     Smoking status: Former Smoker     Quit date: 5/4/1964     Smokeless tobacco: Never Used     Alcohol use No     Drug use: No     Sexual activity: Not on file     Other Topics Concern     Not on file     Social History Narrative       The following portions of the patient's history were reviewed and updated as appropriate: allergies, current medications, past family history, past medical history, past social history, past surgical history and problem list      Objective:     Vitals:    05/10/17 1452 05/10/17 1453   BP: (!) 212/88 147/77   Pulse: 97 91   Weight: 202 lb (91.6 kg)            Total time spent with the patient today was 0 with greater than 50% of the time spent in counseling and care coordination.
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
## 2021-06-10 NOTE — PROGRESS NOTES
Persons accompanying you (the patient) today: Himself    How have you been doing since we saw you last? Please note any concerns.  F/u apt from last Wednesday    Please list any surgeries or procedures you have had since we saw you last:  none    Have you had any falls since your last visit? No    Do you have any pain today? yes    With whom do you currently reside? (alone, spouse, family, assisted living, nursing home)  With wife in single family home   If assisted living or nursing home, please note name and location of facility:

## 2021-06-11 NOTE — PROGRESS NOTES
"Physical Therapy  Physical Therapy Daily Outpatient Documentation    MEDICARE      Rx Units: 2TE, 1MT    Total OP Minutes: 40    Treatment #: 2/12    Pain: 5-6/10  Location: Low back   Intervention: See below    Subjective: Pt reports he's been compliant with his HEP issued at the initial eval and that overall he's been \"up and down\" with his pain and ability to move after having 2 eye surgeries (cataract removal)    Therapeutic Exercise  Total Minutes: 30    -NuStep x 8min, level #3, avg METS:  2.4, SPM: 87    -Prone on elbows x 10 reps with 5 sec hold; pt was limited with range and c/o increased pain  -Hooklying lower trunk rotation x 20 reps (10 ea side); cues to stay within pain-free range)  -Bridging x 15 reps with UE assist; improved hip range this date with better glute elevation  -Seated figure 4 stretch x 45 sec bilaterally; cues for tall posture  -Mini squats x 10 reps; cues to place weight into the heels and to stick bottom out vs allowing too much anterior femur translation causing increased knee pain.    Neuro Re-Ed/Balance  Total Minutes:       Manual Therapy  Total Minutes: 10  -Bilateral lumbar roll gr II-III as tolerated with progressive stretch as tolerated  -Prone PA's gr II-III as tolerate with c/o pain bilaterally  HEP  Posterior pelvic tilts  Bridging  Hooklying lower trunk rotation    Assessment/Plan for week of 7/17/2017-7/21/2017:  Pt returns for his initial subsequent Rx session since his initial eval on 6/26/2017.  Pt continues to demo weakness and limited mobility in the hips and low back.  Plan to incorporate hip mobilizations and multifidus isometric and clam exercises next Rx session.  Plan to progress HEP next Rx session and issue NAVID.    Additional Notes:                "

## 2021-06-11 NOTE — PROGRESS NOTES
Persons accompanying you (the patient) today: Self     How have you been doing since we saw you last? Please note any concerns.  F/u appt, thins has been up and down, wants to know if CNP can refill Naproxen.    Please list any surgeries or procedures you have had since we saw you last:  None     Have you had any falls since your last visit? No    Do you have any pain today? Yes: ongoing body aches     With whom do you currently reside? (alone, spouse, family, assisted living, nursing home)  With spouse in single family home.

## 2021-06-11 NOTE — PROGRESS NOTES
Persons accompanying you (the patient) today: alone    How have you been doing since we saw you last? Please note any concerns.  Pt is concerned that his b12 test is high and also why his vit D3 low. Also he wants to talk about the nuplazid med.    Please list any surgeries or procedures you have had since we saw you last:  none    Have you had any falls since your last visit? No    Do you have any pain today? No    With whom do you currently reside? (alone, spouse, family, assisted living, nursing home)  n/a

## 2021-06-11 NOTE — PROGRESS NOTES
"Physical Therapy  Physical Therapy Initial Assessment     Patient Name: Familia Sales  Today's Date: 6/26/2017     Subjective:  Pt presents to the PT evaluation with the chief complaint of constant chronic LBP x 7 years with his last lumbar decompression surgery in 2008.  Pt reports that the pain shoots down the R LE intermittently but daily, mostly stopping above the knee but can, on occasion, go to the heel.  Pt had L4-5 fusion in 1998.  Pt describes the chronic LBP as achey and feeling weak.  \"I can't walk very long--it will just hurt and I'm tired.\"  Pt is unsure of what can exacerbate his R LE pain but describes the pain as \"shooting\".  Pt feels like the low back pain limits him with \"everything\" including his walking that requires UE support to avoid loss of balance from R foot drop (when the pain is really bad).  Pt currently walks about a 1/2 mile every night and stops about a1/4 mile into it.  The pt ambulates with a 4WW in and outside of the home.  Pt lives with his wife in a house with 1 step to enter.  It is a single level home without a basement.  Pt had a cortisone shot in the low back about 4 years ago and reports having 10-12 cortisone shots over the years.  Pt reports daily stretches that he is compliant with, noting that he can tolerate flexion (\"it relieves the back pain some) but is unable to extend (\"that makes my pain worse\"). Pt takes naproxen to relieve pain.    Current low back pain:  7-8/10; Best:  7-8/10; Worst:  10/10 (\"it makes me cry\")    OBJECTIVE:    Posture:  Standing-  Pt demos slight forward flexed at the hips, approximately 15 degrees flexed at rest  Sensation and Proprioception:  Intact bilaterally  Lumbar AROM:   -Flexion:  85 degrees, no pain   -Extension:  15 degrees (to neutral, upright position), c/o increased R LE pain.  SLR:    -R LE:  30 deg with c/o increased LBP; denies any radicular symptoms this date   -L LE:  45 deg with c/o increased LBP; denies any radicular symptom " this date  Hip:   -Limited bilateral hip IR ~15 degrees   -+ scouring with both R and L hips  Segmental Mobility   -Limited lumbar mobility, grossly hypomobile with PA's, gr 1-2 on the arthrokinematic dysfunction scale   -Grossly limited to no mobility with the sacrum    Intervention:   Manual Therapy x 10 min:  -Bilateral lumbar roll gr II-III as tolerated with progressive stretch as tolerated  -Prone PA's gr II-III as tolerate with c/o pain bilaterally    Therapeutic Exercise x 15 min  -**Hooklying posterior pelvic tilt x 10 reps with 5 sec holds.  With tactile cues pt is able to engage mobility and TA activation but has difficulty holding it due to weakness.  -**Bridging x 8 reps; unable to raise hips >1-2 inches off mat due to weakness.  -**Hooklying lower trunk rotation x 10 reps  **Issued written HEP of the **'ed exercises above**    Past Medical History:  Patient Active Problem List   Diagnosis     Male Erectile Disorder     Lumbar Disc Degeneration     Sciatica     Intervertebral Disc Degeneration     Generalized Osteoarthritis Of Multiple Sites     Acute [id number] Myocardial Infarction - Initial Care ([place])     Coronary Artery Disease     Peripheral Vascular Disease     Chest pain     Right shoulder pain     CAD (coronary artery disease)     Pre-syncope     Parkinsonian features     Past Medical History:   Diagnosis Date     Arthritis      Back pain      Chest pain      Coronary artery disease      Diabetes mellitus     type II     Hearing loss of both ears      Heart attack 2/14/2014     Past Surgical History:   Procedure Laterality Date     APPENDECTOMY  1950     CARPAL TUNNEL RELEASE Right 10/29/2014     CORONARY STENT PLACEMENT  4/30/2014     decompression l3       EYE SURGERY       FOOT SURGERY       HERNIA REPAIR       REPLACEMENT TOTAL KNEE       spinal fusion L4-L5        Assessment/Plan:  See POC for details    Total OP Minutes:  60  Charges:  Eval, 1TE, 1MT

## 2021-06-11 NOTE — PROGRESS NOTES
Assessment:       Familia Sales is a 86 y.o. male with planned surgery: Bilateral cataract surgery left first and right.    Known risk factors for perioperative complications: Patient has new onset Parkinson's disease and Lewy body dementia early    Cardiac Risk Estimation: Low to moderate    Current medications which may produce withdrawal symptoms if withheld perioperatively: None               Plan:     1. Preoperative workup as follows EKG is current stable hemoglobin and potassium will be checked.  2. Change in medication regimen before surgery: No change.  3. Prophylaxis for cardiac events with perioperative beta-blockers: Not indicated.  4. Deep vein thrombosis prophylaxis postoperatively: per surgical team.  5. Other measures: Per surgical team          Subjective:          Ariela is a very pleasant 86-year-old with some chronic medical issues that are under control he has neurological symptoms consistent with early Parkinson's disease and early Lewy body dementia.  Patient is being seen today for preoperative exam to get bilateral cataracts handled first left eye than right eye 2 weeks later.  Patient is alert cooperative oriented still drives he is really doing quite well patient does have multiple orthopedic problems including low back pain and right knee pain which will be handled down the road he currently does get physical therapy for his back at [facility].  He has no recent history of chronic kidney disease invasion of body cavity angina congestive heart failure or recent cardiovascular accidents therefore satisfies the Mortensen criteria for anesthesia.  The patient has been approved for the anticipated cataract surgery all medical questions that were asked were answered I personally reviewed family social history surgeries allergies problems list 14 point review of systems negative      The following portions of the patient's history were reviewed and updated as appropriate: allergies, current  "medications, past family history, past medical history, past social history, past surgical history and problem list.    Review of Systems  A 12 point comprehensive review of systems was negative except as noted.       Review of Systems: A complete 14 point review of systems was obtained and is negative or as stated in the history of present illness.    Past Medical History:   Diagnosis Date     Arthritis      Back pain      Chest pain      Coronary artery disease      Diabetes mellitus     type II     Hearing loss of both ears      Heart attack 2/14/2014     Family History   Problem Relation Age of Onset     Cancer Mother      Cancer Father      Past Surgical History:   Procedure Laterality Date     APPENDECTOMY  1950     CARPAL TUNNEL RELEASE Right 10/29/2014     CORONARY STENT PLACEMENT  4/30/2014     decompression l3       EYE SURGERY       FOOT SURGERY       HERNIA REPAIR       REPLACEMENT TOTAL KNEE       spinal fusion L4-L5       Social History   Substance Use Topics     Smoking status: Former Smoker     Quit date: 5/4/1964     Smokeless tobacco: Never Used     Alcohol use No         Objective:       /64  Pulse 84  Ht 5' 10\" (1.778 m)  Wt 203 lb (92.1 kg)  SpO2 96%  BMI 29.13 kg/m2    General Appearance:  Alert, cooperative, no distress  Head:  Normocephalic, no obvious abnormality  Ears: TM anatomy normal  Eyes:  PERRL, EOM's intact, conjunctiva and corneas clear  Nose:  Nares symmetrical, septum midline, mucosa pink, no sinus tenderness  Throat:  Lips, tongue, and mucosa are moist, pink, and intact  Neck:  Supple, symmetrical, trachea midline, no adenopathy; thyroid: no enlargement, symmetric,no tenderness/mass/nodules; no carotid bruit, no JVD  Back:  Symmetrical, no curvature, ROM normal, no CVA tenderness  Chest/Breast:  No mass or tenderness  Lungs:  Clear to auscultation bilaterally, respirations unlabored   Heart:  Normal PMI, regular rate & rhythm, S1 and S2 normal, no murmurs, rubs, or " gallops  Abdomen:  Soft, non-tender, bowel sounds active all four quadrants, no mass, or organomegaly  Musculoskeletal:  Tone and strength strong and symmetrical, all extremities  Lymphatic:  No adenopathy  Skin/Hair/Nails:  Skin warm, dry, and intact, no rashes  Neurologic:  Alert and oriented x3, no cranial nerve deficits, normal strength and tone, gait steady  Extremities:  No edema.  Adriana's sign negative.    Genitourinary: deferred  Pulses:  Equal bilaterally      Cardiographics  ECG: No acute changes  Echocardiogram: Not indicated    Imaging  Chest x-ray: Not indicated hemogram potassium will be checked prior to surgery    Lab Revew      hemoglobin and potassium and CBC will be checked and reviewed prior to surgery          This note has been dictated using voice recognition software. Any grammatical or context distortions are unintentional and inherent to the the software.

## 2021-06-11 NOTE — PROGRESS NOTES
"Physical Therapy  Medicare Certification      Medical Diagnosis: Chronic Low Back Pain         Treatment Dx: Chronic Low Back Pain with R Sciatic Pain  Referring MD: Deandra Stewart FNP  Onset date 6/21/2017     Start of Care 6/26/2017      Assessment:  Pt is an 85 yo male with a history of chronic LBP with R LE Sciatic pain for almost 20 years with reported lumbar decompression surgery in 2008 and h/o L4/5 in 1998.  The pt feels that for the past 7 years the pain has been constant with the \"best\" that it gets is 7-8/10. The pain limits the pt's sitting tolerance, walking distance, and necessitates that the pt walks with a 4WW to ensure gait stability if his sciatic pain were to become exacerbated.  PT eval reveals gross lumbar hypomobility with significant core weakness, limited bilateral SLR, as well as possible hip bilateral OA.  Pt would benefit from skilled 1:1 PT for an exercise program to work on gross cervical and proximal hip strengthening and manual therapy to address limited mobility to improve overall pain and functional activity as walking to go shopping without having to take frequent rest breaks.    Prognostic Indicators:  Rehabilitation potential: Poor.  History of LBP for 20 years, mild cognitive impairment    Impairment:  Gait, Motor Function, Activity Tolerance, Pain and joint integrity    Functional Goals:  to be met by 12 visits  Pt will:  1.  Demo improved NAVID by 10% for overall functional impairment.  2.  Tolerate walking x 1/2 mile without stopping and with reports of 5/10 low back pain.  3.  Tolerate completing ADL's with c/o 5/10 pain when extending his back.  4.  Be independent with HEP for ongoing symptom management.    Plan of Care:  Communication with referral source, patient, caregiver., Paitent/Family Instruction: Treatment plan/rationale, home exercise program, expected functional outcome., Therapeutic Exercise, Neuromuscular reeducation, Manual Therapy and Therapeutic " Activity    Frequency / Duration: 2 x/week for  6 weeks Up to 12 visits    Missy Watkins, PT, DPT, MTC, NCS  6/26/2017   12:35 PM      MEDICARE PATIENTS:  [facility] # [id number]  Provider #   Certification Dates: from 6/26/2017 to 9/23/2017    Physician Recommendation:  1. I certify the need for these services furnished within this plan and while under my care. I agree with the therapist's recommendation for plan of care.    2. If there is any recommendation for modification of therapy plan, please indicate below.      Physician's Signature (Printed):  _____________________________
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
## 2021-06-11 NOTE — PROGRESS NOTES
"Assessment:     1. Parkinson like symptoms  2. Mild cognitive impairment  3. Dizziness  4. Orthostatic B/P    Plan:     1. Reduce Nuplazid to one tab daily  2. Neuropsychological testing  3. Return to clinic in 4 weeks    The patient returns to the memory loss clinic, he was last seen by me on 6/5/17, where I started the patient on Nuplazid, the patient took the medication one night and felt dizzy all day the following day. I did reduce the dose to 1 tab every night, to see if this will help with the patient with delusions, shadowing, and REM sleep disorder. The patient reports \"feeling shaky\" all over even on the inside. A long discussion was held with the patient about differential diagnosis, we are trying different treatment plans to see what helps with the patient's symptoms. Patient is keeping a log of symptoms for better analysis. Given the patient had no effect from Sinemet, there is a decreased chance that the patient has Parkinson's Disease , but the patient does have Parkinsonisms.     Subjective:        Familia Sales is a 86 y.o. male who initially presented to the memory loss clinic on 5/7/15  for a full diagnostic evaluation. PCP completed a Mini-Mental exam in office (28/30), but thought he should be evaluated in this office due to changes in behavior. Wife explained that she was concerned about the patient's behavior and cognition, about 5 years prior the patient began to show mood swings and extreme anger.  The patient experienced similar behavior in the past, but he was given, they believe, an antidepressant which resulted in great improvement in behavior.  The patient also saw a psychologist at that time and was told that he had \"deep psychological problems\", although wife reported that he was \"perfect \", he had his \"issues\". Medication was stopped after several years and the patient continued to do well up until about 5 prior. The patient was found to be \"window peeping\" a neighbor.  A " "couple of years after the anger started, the patient was found to be \"addicted\" to pornography and dating web sites.  He was constantly interacting with people online until his wife discovered what he had been doing. He was embarrassed and remorseful, wife moved out and was gone for 3 months.  During that time, wife reported that the patient did not function well.  When she came back she found rotting food in the refrigerator, broken things in the home, and that he spent most of his time lying around in his pajamas.  Once she moved back in, she assumed all care of the home like she used to and he seemed to be happy. She believed that he was not obsessing about pornography or dating web sites anymore, but she wouldn't be surprised if he looked occasionally \"because he is a carol and did that from time to time when he was younger\".  The patient continued with mood swings and extreme anger that seems to come out of the blue, he had \"another personality in his head and argues with me in his head and thought the arguments were real\".  He had never been physically aggressive with his wife, but was yelling at her frequently. The patient and his wife were on public transportation when the patient apparently became extremely angry when he saw a 16-year-old boy sitting in a seat meant for handicapped people.  He began yelling at the boy and telling him how inappropriate it was, especially when a handicapped adult came on the bus and needed to sit somewhere else.  There were many seats available for this person, even other handicapped spots next to the boy.  Wife was embarrassed. The patient still was an empathetic person, but his wife believed that he did not recognize when she was upset like he used to. There were no stereotypical behaviors or new fixations or habits with food. Increased anger started 5 years ago, patient's wife also states the patient's cognition changed as well.  She says that it has worsened to a point " "where he has difficulty learning new things and is often times forgetful, specifically with recent information.  He has difficulty understanding when events from the past happen, so his standard response is \"40 or 50 years ago\".  She also says that he seems to have difficulty with directions and has gotten lost while driving.  She also says that he drives erratically at times, weaving through traffic, running red lights and stopping the car at the very last moments before hitting something.  The patient does not have any specific chores or responsibilities around the home, but she has noted that he seems to be apathetic and shows a lack of interest in doing anything.  At times he doesn't even change out of his pajamas.     Patient reports that he had \"flares of anger\" and trouble with memory for about a year.  He had difficulty with anger \"in the 1980's\" and he was given a medication that really helped, but he no longer takes it.  He felt incredibly sensitive to comments made by his wife and he either argues with her or dismisses her altogether.  When describing the incident with the handicapped seating, the patient said that he was yelling at \"kids\" and that the bus was totally full so the handicapped person didn't have anywhere to sit.  He was surprised by how angry he became, but he said that this reaction might be normal for someone else.  Regarding his memory, he reports that he had difficulty with names and word finding and it takes him longer to recall information.  At times his wife has to repeat information for him because he either forgets or he can't understand what she is talking about.  He thinks that his anger and memory difficulties are getting worse, but he denies any difficulty with functionality. He tells me that his driving was good, but he admitted that his wife would likely disagree with him. Neuropsychological testing was completed on 7/27/15 and the DIAGNOSTIC IMPRESSIONS was Cognitive " complaints with normal neuropsychological evaluation, Mood disorder, unspecified, Tobacco use disorder, in full sustained remission, 8/3/15 History, physical examination and testing was suspicious for a possible emerging neurodegenerative illness, yet neuropsychological testing was normal.  Patient had some difficulty with impulse control and complaints of short-term memory loss. Prozac 20 mg was started. 9/10/15 The patient and wife noted a great change in the patient's demeanor since starting on Prozac.  He was much more comfortable. 9/16/16   Patient reported that he was having leg cramps so he discontinued Prozac in April and has not experienced leg cramps since.  Unfortunately since that time he had become increasingly sensitive and was feeling very down emotionally.  He was impatient with his wife and they were arguing more. His sleep was interrupted due to frequent waking then had difficulty falling back to sleep Cymbalta 60 mg was started. 9/30/16 he believes that 30 mg was a better dose.  He feels a little bit more activated at 60 mg, but 30 mg he felt more comfortable.  5/10/17  Patient presented to memory loss clinic to ask if there was any advancement in his cognitive impairment. When :MA took the patient's B/P it was 212/88 sitting 147/77 standing  I was questioning the patient and he suddenly was pale and diaphoretic. I  Retook B/P 163/85. The patient reported that he had a hard time breathing. He also had tingling down both arms. Patient has a history of CAD, 2 previous MI with stents placed, anxiety/depression, and diabetes. He reported that he felt dizzy and confused since he got up this morning. No chest pain. Patient reported increased SOB, internal code 9 called. House doctor assessed and agreed that patient should be sent for further testing. [facility] sent to [facility].  5/18/17 The patient believes he may have Lewy Body, he also complains about a tremor, on the inside and in his hands. He also  believes that his anxiety is getting worse thinking about this and his anxiety is increasing his cognitive deficits. I will have the patient take another neuropsych and we will do another CT scan. I believe that the patient's anxiety is not under good control. We discussed possible treatment plans, The patient would first like to rule out Parkinson's Disease, the patient does have a masked face, decreased eye blinking, rigidity, it is difficult to  his gait due to his chronic pain. I will do the Sinemet challenge on the patient. He will take a half of a tab of Sinemet IR 25/100 TID. I did discuss all potential benefits and risks to the patient. The patient verbalized understanding. I have asked the patient to keep a log and write down things that get better or things that get worse as we try different treatment plans.  6/5/17 The patient reports that the Sinemet did not help his symptoms. He reports that he does not sleep. He has very vivid dreams, he will also see things in his peripheral and when he turns the object is not there. We will start the patient on Nuplazid, the patient has Parkinsonism. I do believe that the patient does suffer from anxiety but he would like to see if we can help him with sleep first, before treating his anxiety. Patient and wife are reading a lot on the Internet on Lewy Body, the patient believes he does have the disease. I discussed Lewy Body and he should monitor and document his symptoms. I do believe that the patient's anxiety does play into the patient's symptoms. We will try different treatment plans.                Patient presented to memory loss clinic to ask if there was any advancement in his cognitive impairment. When :MA took the patient's B/P it was 212/88 sitting 147/77 standing  I was questioning the patient and he suddenly was pale and diaphoretic. I  Retook B/P 163/85. The patient reported that he had a hard time breathing. He also had tingling down both arms.  Patient has a history of CAD, 2 previous MI with stents placed, anxiety/depression, and diabetes. He reported that he felt dizzy and confused since he got up this morning. No chest pain. Patient reported increased SOB, internal code 9 called. House doctor assessed and agreed that patient should be sent for further testing. Wife was called and updated.     Patient Active Problem List    Diagnosis Date Noted     Parkinsonian features 05/18/2017     Chest pain 07/21/2014     Right shoulder pain 07/21/2014     CAD (coronary artery disease) 07/21/2014     Pre-syncope 07/21/2014     Acute [id number] Myocardial Infarction - Initial Care ([place])      Coronary Artery Disease      Peripheral Vascular Disease      Male Erectile Disorder      Lumbar Disc Degeneration      Sciatica      Intervertebral Disc Degeneration      Generalized Osteoarthritis Of Multiple Sites      Past Medical History:   Diagnosis Date     Arthritis      Back pain      Chest pain      Coronary artery disease      Diabetes mellitus     type II     Hearing loss of both ears      Heart attack 2/14/2014     Past Surgical History:   Procedure Laterality Date     APPENDECTOMY  1950     CARPAL TUNNEL RELEASE Right 10/29/2014     CORONARY STENT PLACEMENT  4/30/2014     decompression l3       EYE SURGERY       FOOT SURGERY       HERNIA REPAIR       REPLACEMENT TOTAL KNEE       spinal fusion L4-L5       Family History   Problem Relation Age of Onset     Cancer Mother      Cancer Father      Current Outpatient Prescriptions   Medication Sig Dispense Refill     acetaminophen (TYLENOL) 500 MG tablet Take 1,000 mg by mouth every 6 (six) hours as needed for pain.        aspirin 81 MG tablet Take 81 mg by mouth daily.       carbidopa-levodopa (SINEMET)  mg per tablet Take 0.5 tablets by mouth 3 (three) times a day. Take at 7 am, 1200, 5 pm 45 tablet 3     coQ10, liposomal ubiquinol, 8 mg/mL Liqd Take 10 mL by mouth.       DULoxetine (CYMBALTA) 30 MG  capsule Take 1 capsule (30 mg total) by mouth daily. (Patient taking differently: Take 20 mg by mouth 2 (two) times a day. ) 90 capsule 3     fexofenadine (ALLEGRA) 180 MG tablet Take 180 mg by mouth daily as needed.        fluticasone (FLONASE) 50 mcg/actuation nasal spray 2 sprays into each nostril daily. 16 g 12     GLUC/SOO-MSM#1/VIT C/CLAUDETTE/BOR (GLUCOSAMINE-CHOND-MSM COMPLEX ORAL) Take 1 each by mouth daily.       MV-MN/FA/LYCOPENE/LUT/HB#185 (DIABETIC SUPPORT FORMULA ORAL) Take 1 Package by mouth once daily. Takes a diabetic vitamin/supplement thomas- gets at [employer]       naproxen (NAPROSYN) 500 MG tablet Take 1 tablet (500 mg total) by mouth 3 (three) times a day with meals. 90 tablet 3     nitroglycerin (NITROSTAT) 0.4 MG SL tablet Place 1 tablet (0.4 mg total) under the tongue every 5 (five) minutes as needed for chest pain. 25 tablet 3     omeprazole (PRILOSEC) 40 MG capsule Take 1 capsule (40 mg total) by mouth as needed. Before a meal 90 capsule 0     pimavanserin (NUPLAZID) 17 mg Tab tablet Take 2 tablets (34 mg total) by mouth daily. 60 tablet 8     traZODone (DESYREL) 50 MG tablet Take 50 mg by mouth at bedtime.       VIT C/VIT E/LUTEIN/MIN/OMEGA-3 (OCUVITE ORAL) Take 1 capsule by mouth daily.       No current facility-administered medications for this encounter.        Allergies   Allergen Reactions     Codeine Itching     And sweating     Ibuprofen Itching     Morphine      Excessive sweating     Tagamet [Cimetidine]      Vicodin [Hydrocodone-Acetaminophen] Other (See Comments)     Profuse sweating     Social History     Social History     Marital status:      Spouse name: N/A     Number of children: N/A     Years of education: N/A     Occupational History     Not on file.     Social History Main Topics     Smoking status: Former Smoker     Quit date: 5/4/1964     Smokeless tobacco: Never Used     Alcohol use No     Drug use: No     Sexual activity: Not on file     Other Topics Concern     Not  on file     Social History Narrative       The following portions of the patient's history were reviewed and updated as appropriate: allergies, current medications, past family history, past medical history, past social history, past surgical history and problem list    Objective:     Vitals:    06/21/17 1400   BP: 159/85   Pulse: 86   Weight: 201 lb (91.2 kg)        Total time spent with the patient today was 30 with greater than 50% of the time spent in counseling and care coordination. I asked him to call with any questions or concerns and will see him again in clinic in about 4 weeks. We discussed the risks and benefits of the medication including risk of worsening depression with medication adjustments and even the possibility of emergence of suicidality

## 2021-06-11 NOTE — PROGRESS NOTES
How have you been doing since we last saw you? any concerns? Pt. Says he has been shaking more.      Current Symptoms : Yes shaking.

## 2021-06-12 NOTE — PROGRESS NOTES
Physical Therapy  Physical Therapy Daily Outpatient Documentation  DISCHARGE SUMMARY    MEDICARE      Rx Units: 3TE, 1MT     Total OP Minutes: 55     Treatment #: 10/12      Pain: 4/10  Location: Low back   Intervention: See below    Subjective: Pt reports that he consistently uses his walker for ambulation to help with pain management and endurance.  Overall patient states that his pain has improved since beginning therapy and he has been compliant with his HEP.     Therapeutic Exercise  Total Minutes: 40     NuStep: workload #4 x 10 min, avg METS:  3.1, SPM:  80    -Hook lying posterior pelvic tilts x 10 reps - good form  -Bridging x 4 reps - limited reps in order to avoid flare up of pain that has occurred in previous therapy sesssions  -Side lying clams with orange Theraband x 15 reps - good form  -Prone press up to elbows x 10 reps - good form, VC for breathing  - Hooklying trunk rotations x 20 each direction - cued to stay in a pain free range.   -Seated piriformis stretch x 10-15 seconds x 6 reps B  - VC for breathing. Decreased ROM on L  -Squats x 8 reps - good form  -NAVID score: 23/50 (46% disability)    HEP  Posterior pelvic tilts  Bridging  Hooklying lower trunk rotation  Prone on elbows  S/L clams   Seated piriformis stretch    Assessment/Plan for week of 8/14/2017-8/18/2017:  Patient has been seen for 10 PT sessions from 6/26/2017 to 8/18/2017 for management of chronic low back pain. Since beginning therapy patient's pain ratings have decreased from 8/10 to 4/10 with walking and activity.  He also showed a reduction in his NAVID score from 58% to 46% and he has been able to walk 1/2 mile on a routine basis without significant low back pain.  Patient is currently independent with his HEP to continue self managing his symptoms.  Patient will be discharged from skilled PT at this time as all PT goals have been met.  Patient is in agreement with plan.       Additional Notes:  PT goals:  Pt will:  1.  Betito  improved NAVID by 10% for overall functional impairment. - MET  2.  Tolerate walking x 1/2 mile without stopping and with reports of 5/10 low back pain. - MET  3.  Tolerate completing ADL's with c/o 5/10 pain when extending his back. - MET  4.  Be independent with HEP for ongoing symptom management.  - MET

## 2021-06-12 NOTE — PROGRESS NOTES
Physical Therapy  Physical Therapy Daily Outpatient Documentation    MEDICARE      Rx Units: 3TE, 1MT     Total OP Minutes: 55     Treatment #: 8/12-reassessment completed on 8/2/2017    Pain: 4-5/10  Location: Low back   Intervention: See below    Subjective: Pt reports that his back pain continues to feel better since last Rx session.  He comes to PT without any ambulatory device this date.        Therapeutic Exercise  Total Minutes: 40     NuStep x 10 min total, level #5 x 10 min, avg METS:  3.6, SPM:  97    -Manual SLR with contract-relax on ea LE x 1.5 min total; improved ROM bilaterally with R LE to 65 degrees (PT purposely limits range so as not to exacerbate symptoms), L LE to 80 degrees  -Manual L piriformis stretch 2 x 45 sec  -Quadruped alt UE/LE x 10 reps (5 ea side), Cues to engage glutes, SBA for stability  -S/L clams x 15 reps ea side with orange thera band.  Good form this date  -Supine Multifidus isometric x 10 reps ea side with 5 sec hold.  No additional cues needed for technique.  -Standing mini squats 2 x 15 reps - Improved form this date; occ cues for tall posture.      Neuro Re-Ed/Balance  Total Minutes:       Manual Therapy  Total Minutes: 15  -Prone lumbar PA's gr II-III as tolerate with c/o pain bilaterally L>R  -Prone bilateral hip anterior capsule mobilizations gr II-III as tolerated; both are significantly limited  -Prone R piriformis STM with trigger point release as tolerated.    HEP  Posterior pelvic tilts  Bridging  Hooklying lower trunk rotation  Prone on elbows  S/L clams     Assessment/Plan for week of 8/7/2017-8/11/2017:  See next note for weekly AP.    Additional Notes:

## 2021-06-12 NOTE — PROGRESS NOTES
Physical Therapy  Physical Therapy Daily Outpatient Documentation    MEDICARE      Rx Units: 3TE, 1MT     Total OP Minutes: 55     Treatment #: 9/12-reassessment completed on 8/2/2017    Pain: 4/10  Location: Low back   Intervention: See below    Subjective: Pt reports that his back pain continues to feel better since last Rx session.  He uses SPC cane today, states he feels very tired today but is unsure why. Has some blurriness in R eye after cataract surgery 1.5 months ago, has an eye doctor appt today.     Therapeutic Exercise  Total Minutes: 40     NuStep x 10 min total, level #5 x 10 min, avg METS:  3.5, SPM:  93    -Manual SLR with contract-relax on ea LE x 2.5 min total; improved ROM bilaterally with R LE to 65 degrees (PT purposely limits range so as not to exacerbate symptoms), L LE to 60 degrees  -Manual L piriformis stretch 2 x 30 sec  -Quadruped alt UE x 8 reps each side, Cues to engage core, SBA for stability  -S/L clams x 15 reps ea side with orange thera band.  Good form this date, minimal tactile cues to maintain sidelying position.  - Side stepping with orange TB x 15 steps each way. Pt reports pain in TFL region, cued to IR hips when stepping out to attempt to activate glut med.   - Hooklying trunk rotations x 15 each direction - cued to stay in a pain free range.    - supine knees to chest x 10 reps with min A, pt uses UE support on HS to pull knees to chest. Reports a stretch in Low back but no pain.    - Sit to stands from mat with no UE use x 10 - pt reports pain in R hip when completed.     Neuro Re-Ed/Balance  Total Minutes:       Manual Therapy  Total Minutes: 15  -Prone lumbar PA's gr II-III as tolerate with c/o pain bilaterally R>L  -Prone bilateral hip anterior capsule mobilizations gr II-III as tolerated; both are significantly limited  -Prone R piriformis STM with trigger point release as tolerated, with added ER of R LE. Pt states this is not as sore as when done in the past.      HEP  Posterior pelvic tilts  Bridging  Hooklying lower trunk rotation  Prone on elbows  S/L yamil     Assessment/Plan for week of 8/14/2017-8/18/2017:  Pt continues to feel like PT is helping his back pain. He responds well to manual therapy as well as stretching, and feels that both have helped him to attain more mobility with less pain. Pt states he has one session left that is scheduled for 8/18, and would be interested in some core exercises added to his HEP as he feels that his core is weak. Pt remains appropriate for skilled 1:1 therapy to address his limitations and maximize his functional mobility. Discuss with pt if next visit is his last, as his plan of care would seem to indicate that pt has 3 more visits, but only one more scheduled one      Additional Notes:

## 2021-06-12 NOTE — PROGRESS NOTES
Assessment / Impression     1. Closed fracture of nasal bone, initial encounter  Ambulatory referral to ENT   2. Nose injury, initial encounter  XR Nasal Bones 3 or More VWS   3. Epistaxis  XR Nasal Bones 3 or More VWS         Plan:     I reviewed today's x-ray results with patient.  Given there is displacement of nasal bone, and because I was unable to fully visualize nares due to dried blood, I would recommend patient have referral to ENT for further evaluation.  Did not order CT scan for now, as he does not have sinus tenderness to palpation, but will defer to ENT to determine if additional imaging needed.      Subjective:      HPI: Familia Sales is a 86 y.o. male, new to me, who presents for nose bleed and nose after fall.  Patient has history of Parkinson's, and last night, he was by his garden, somehow tripped over a sign, and fell hitting his nose onto the ground.  Since then, has experienced pain over the nasal bridge, and was having epistaxis, until he packed his nose with tissues, and the bleeding eventually stopped by this morning.  He is still experiencing significant nasal pain.  Does not recall hitting any other place of his body.  No vision changes or ear pain.  He is on aspirin daily, no other blood thinners.      Medical History:     Patient Active Problem List   Diagnosis     Male Erectile Disorder     Lumbar Disc Degeneration     Sciatica     Intervertebral Disc Degeneration     Generalized Osteoarthritis Of Multiple Sites     Acute [id number] Myocardial Infarction - Initial Care ([place])     Coronary Artery Disease     Peripheral Vascular Disease     Chest pain     Right shoulder pain     CAD (coronary artery disease)     Pre-syncope     Parkinsonian features       Past Medical History:   Diagnosis Date     Arthritis      Back pain      Chest pain      Coronary artery disease      Diabetes mellitus     type II     Hearing loss of both ears      Heart attack 2/14/2014       Current  Medications:     Current Outpatient Prescriptions   Medication Sig     acetaminophen (TYLENOL) 500 MG tablet Take 1,000 mg by mouth every 6 (six) hours as needed for pain.      aspirin 81 MG tablet Take 81 mg by mouth daily.     coQ10, liposomal ubiquinol, 8 mg/mL Liqd Take 10 mL by mouth.     DULoxetine (CYMBALTA) 30 MG capsule Take 1 capsule (30 mg total) by mouth daily. (Patient taking differently: Take 40 mg by mouth 2 (two) times a day. )     fexofenadine (ALLEGRA) 180 MG tablet Take 180 mg by mouth daily as needed.      fluticasone (FLONASE) 50 mcg/actuation nasal spray 2 sprays into each nostril daily.     GLUC/SOO-MSM#1/VIT C/CLAUDETTE/BOR (GLUCOSAMINE-CHOND-MSM COMPLEX ORAL) Take 1 each by mouth daily.     MV-MN/FA/LYCOPENE/LUT/HB#185 (DIABETIC SUPPORT FORMULA ORAL) Take 1 Package by mouth once daily. Takes a diabetic vitamin/supplement thomas- gets at [facility]     naproxen (NAPROSYN) 500 MG tablet Take 1 tablet (500 mg total) by mouth 3 (three) times a day with meals.     nitroglycerin (NITROSTAT) 0.4 MG SL tablet Place 1 tablet (0.4 mg total) under the tongue every 5 (five) minutes as needed for chest pain.     omeprazole (PRILOSEC) 40 MG capsule Take 1 capsule (40 mg total) by mouth as needed. Before a meal     pimavanserin (NUPLAZID) 17 mg Tab tablet Take 2 tablets (34 mg total) by mouth daily.     pregabalin (LYRICA) 100 MG capsule Take 1 capsule (100 mg total) by mouth 2 (two) times a day.     traZODone (DESYREL) 50 MG tablet Take 50 mg by mouth at bedtime.     VIT C/VIT E/LUTEIN/MIN/OMEGA-3 (OCUVITE ORAL) Take 1 capsule by mouth daily.       Family History:     Family History   Problem Relation Age of Onset     Cancer Mother      Cancer Father        Review of Systems  All other systems reviewed and are negative.         Social History:     History   Smoking Status     Former Smoker     Quit date: 5/4/1964   Smokeless Tobacco     Never Used     Social History     Social History Narrative         Objective:  "    /72 (Patient Site: Right Arm, Patient Position: Sitting, Cuff Size: Adult Regular)  Pulse 78  Resp 16  Ht 5' 10\" (1.778 m)  Wt 202 lb 6.4 oz (91.8 kg)  BMI 29.04 kg/m2  Physical Examination: General appearance - alert, well appearing, and in no distress  Eyes: pupils equal and reactive, extraocular eye movements intact, normal fundoscopic exam  Ears: normal external ears, clear canals,  TMs appear normal, with no redness or bulging noted.  Nose: there is bruising noted across his nasal bridge.  He is moderately tender to touch over the nasal bridge and left nostril.  There is dried blood which makes examination of nares difficult, but no sign of active bleeding.    Sinus: No tenderness to palpation over frontal or maxillary sinuses  Mouth: mucous membranes moist, pharynx normal without lesions  Neck: supple, no significant adenopathy or thyromegaly  Psychiatric: Normal affect. Does not appear anxious or depressed.    X-ray of nose: by my interpretation, bilateral nasal bone fracture.        Padmini Rivero MD  8/22/2017  3:29 PM          "

## 2021-06-12 NOTE — PROGRESS NOTES
Physical Therapy  Physical Therapy Daily Outpatient Documentation    MEDICARE      Rx Units: 3TE, 1MT    Total OP Minutes: 55    Treatment #: 4/12    Pain: 6/10  Location: Low back   Intervention: See below    Subjective: Pt reports that he was very painful (9-10/10) and sore after Friday's session for the next 24-48 hours but started to feel better on Monday.  PT took time to explain DOMS but the pt said he was very painful and felt like the stretching might have been too aggressive.  Pt reports compliance with the HEP.        Therapeutic Exercise  Total Minutes: 45    -NuStep x 8 min, workload #6, avg METS:  3.2, SPM: 67    -Prone on elbows x 10 reps with 5 sec hold; improving range with ability to keep hips towards the mat  -Manual SLR with contract-relax on ea LE x 1.5 min total; improved ROM bilaterally with R LE to ~50 degrees, L LE to ~75 degrees  -Manual R piriformis stretch 2 x 45 sec  -Hooklying lower trunk rotation x 10 reps (5 ea side); cues to stay within pain-free range  -Bridging 2 x 15 reps with UE assist, DF, and cues to engage glutes; improved hip range this date with better glute elevation (attempted without UE assist but this caused B hamstring cramping)  -Multifidus isometric x 8 reps ea side with 5 sec hold.  Cues for coordination of the exercise.  -S/L clams x 10 reps ea side.  Initial verbal and tactile cues to engage glute med to assist with prevention of pelvis from rolling backwards    NAVID:  58%    Neuro Re-Ed/Balance  Total Minutes:       Manual Therapy  Total Minutes: 10  -Prone PA's gr II-III as tolerate with c/o pain bilaterally  -Prone bilateral hip anterior capsule mobilizations gr II-III as tolerated; both are significantly limited    HEP  Posterior pelvic tilts  Bridging  Hooklying lower trunk rotation  Prone on elbows  S/L clams     Assessment/Plan for week of 7/24/2017-7/28/2017:  Pt tolerated hip mobilizations well this date.  PT limited the duration of the stretches this date  so as not to exacerbate the pt's pain.  The pt's NAVID is significantly elevated at 58% percieved disability and possibly has a somatic presentation due to it's chronicity.  Plan to assess and monitor pt's response to today's treatment session of finding the balance of progression but not exacerbating his pain to a 9-10/10.  Plan to add quadruped exercises and mini squats for core/LE strengthening if able next Rx session.  See next Rx session for weekly AP.    Additional Notes:

## 2021-06-12 NOTE — PROGRESS NOTES
".  Assessment:     1. Parkinson like symptoms  2. Mild cognitive impairment  3. Dizziness  4. Orthostatic B/P    Plan:     1. Monitor sleep  2. Neuropsychological testing  3. Labs   4. Return to clinic in 4 weeks    The patient returns to the memory loss clinic, he was last seen by me on 7/19/17, where no medication changes were made. The patient had severe dry eyes so he stopped the Nuplazid thinking that was causing it. He later stated that he has an infection in his eye so he has been putting antibiotic drops into his eyes, which is what is probable causing the dry eyes. He reports that he is doing pretty good. He is still doing PT with is helping him with his gait and chronic pain. We will monitor the patient, he reports no sleeping well again    Subjective:        Familia Sales is a 86 y.o. male who initially presented to the memory loss clinic on 5/7/15  for a full diagnostic evaluation. PCP completed a Mini-Mental exam in office (28/30), but thought he should be evaluated in this office due to changes in behavior. Wife explained that she was concerned about the patient's behavior and cognition, about 5 years prior the patient began to show mood swings and extreme anger.  The patient experienced similar behavior in the past, but he was given, they believe, an antidepressant which resulted in great improvement in behavior.  The patient also saw a psychologist at that time and was told that he had \"deep psychological problems\", although wife reported that he was \"perfect \", he had his \"issues\". Medication was stopped after several years and the patient continued to do well up until about 5 prior. The patient was found to be \"window peeping\" a neighbor.  A couple of years after the anger started, the patient was found to be \"addicted\" to pornography and dating web sites.  He was constantly interacting with people online until his wife discovered what he had been doing. He was embarrassed and remorseful, " "wife moved out and was gone for 3 months.  During that time, wife reported that the patient did not function well.  When she came back she found rotting food in the refrigerator, broken things in the home, and that he spent most of his time lying around in his pajamas.  Once she moved back in, she assumed all care of the home like she used to and he seemed to be happy. She believed that he was not obsessing about pornography or dating web sites anymore, but she wouldn't be surprised if he looked occasionally \"because he is a carol and did that from time to time when he was younger\".  The patient continued with mood swings and extreme anger that seems to come out of the blue, he had \"another personality in his head and argues with me in his head and thought the arguments were real\".  He had never been physically aggressive with his wife, but was yelling at her frequently. The patient and his wife were on public transportation when the patient apparently became extremely angry when he saw a 16-year-old boy sitting in a seat meant for handicapped people.  He began yelling at the boy and telling him how inappropriate it was, especially when a handicapped adult came on the bus and needed to sit somewhere else.  There were many seats available for this person, even other handicapped spots next to the boy.  Wife was embarrassed. The patient still was an empathetic person, but his wife believed that he did not recognize when she was upset like he used to. There were no stereotypical behaviors or new fixations or habits with food. Increased anger started 5 years ago, patient's wife also states the patient's cognition changed as well.  She says that it has worsened to a point where he has difficulty learning new things and is often times forgetful, specifically with recent information.  He has difficulty understanding when events from the past happen, so his standard response is \"40 or 50 years ago\".  She also says that he " "seems to have difficulty with directions and has gotten lost while driving.  She also says that he drives erratically at times, weaving through traffic, running red lights and stopping the car at the very last moments before hitting something.  The patient does not have any specific chores or responsibilities around the home, but she has noted that he seems to be apathetic and shows a lack of interest in doing anything.  At times he doesn't even change out of his pajamas.     Patient reports that he had \"flares of anger\" and trouble with memory for about a year.  He had difficulty with anger \"in the 1980's\" and he was given a medication that really helped, but he no longer takes it.  He felt incredibly sensitive to comments made by his wife and he either argues with her or dismisses her altogether.  When describing the incident with the handicapped seating, the patient said that he was yelling at \"kids\" and that the bus was totally full so the handicapped person didn't have anywhere to sit.  He was surprised by how angry he became, but he said that this reaction might be normal for someone else.  Regarding his memory, he reports that he had difficulty with names and word finding and it takes him longer to recall information.  At times his wife has to repeat information for him because he either forgets or he can't understand what she is talking about.  He thinks that his anger and memory difficulties are getting worse, but he denies any difficulty with functionality. He tells me that his driving was good, but he admitted that his wife would likely disagree with him. Neuropsychological testing was completed on 7/27/15 and the DIAGNOSTIC IMPRESSIONS was Cognitive complaints with normal neuropsychological evaluation, Mood disorder, unspecified, Tobacco use disorder, in full sustained remission, 8/3/15 History, physical examination and testing was suspicious for a possible emerging neurodegenerative illness, yet " neuropsychological testing was normal.  Patient had some difficulty with impulse control and complaints of short-term memory loss. Prozac 20 mg was started. 9/10/15 The patient and wife noted a great change in the patient's demeanor since starting on Prozac.  He was much more comfortable. 9/16/16   Patient reported that he was having leg cramps so he discontinued Prozac in April and has not experienced leg cramps since.  Unfortunately since that time he had become increasingly sensitive and was feeling very down emotionally.  He was impatient with his wife and they were arguing more. His sleep was interrupted due to frequent waking then had difficulty falling back to sleep Cymbalta 60 mg was started. 9/30/16 he believes that 30 mg was a better dose.  He feels a little bit more activated at 60 mg, but 30 mg he felt more comfortable.  5/10/17  Patient presented to memory loss clinic to ask if there was any advancement in his cognitive impairment. When :MA took the patient's B/P it was 212/88 sitting 147/77 standing  I was questioning the patient and he suddenly was pale and diaphoretic. I  Retook B/P 163/85. The patient reported that he had a hard time breathing. He also had tingling down both arms. Patient has a history of CAD, 2 previous MI with stents placed, anxiety/depression, and diabetes. He reported that he felt dizzy and confused since he got up this morning. No chest pain. Patient reported increased SOB, internal code 9 called. House doctor assessed and agreed that patient should be sent for further testing. Patient sent to [facility].  5/18/17 The patient believes he may have Lewy Body, he also complains about a tremor, on the inside and in his hands. He also believes that his anxiety is getting worse thinking about this and his anxiety is increasing his cognitive deficits. I will have the patient take another neuro psych and we will do another CT scan. I believe that the patient's anxiety is not under good  "control. We discussed possible treatment plans, The patient would first like to rule out Parkinson's Disease, the patient does have a masked face, decreased eye blinking, rigidity, it is difficult to  his gait due to his chronic pain. I will do the Sinemet challenge on the patient. He will take a half of a tab of Sinemet IR 25/100 TID. I did discuss all potential benefits and risks to the patient. The patient verbalized understanding. I have asked the patient to keep a log and write down things that get better or things that get worse as we try different treatment plans.  6/5/17 The patient reports that the Sinemet did not help his symptoms. He reports that he does not sleep. He has very vivid dreams, he will also see things in his peripheral and when he turns the object is not there. We will start the patient on Nuplazid, the patient has Parkinsonism. I do believe that the patient does suffer from anxiety but he would like to see if we can help him with sleep first, before treating his anxiety. Patient and wife are reading a lot on the Internet on Lewy Body, the patient believes he does have the disease. I discussed Lewy Body and he should monitor and document his symptoms. I do believe that the patient's anxiety does play into the patient's symptoms. We will try different treatment plans.    6/21/17  the patient took the medication one night and felt dizzy all day the following day. I did reduce the dose to 1 tab every night, to see if this will help with the patient with delusions, shadowing, and REM sleep disorder. The patient reports \"feeling shaky\" all over even on the inside. A long discussion was held with the patient about differential diagnosis, we are trying different treatment plans to see what helps with the patient's symptoms. Patient is keeping a log of symptoms for better analysis. Given the patient had no effect from Sinemet, there is a decreased chance that the patient has Parkinson's Disease " ", but the patient does have Parkinsonisms.   7/19/17 He reports sleeping much better. His tremor is reduced. His chronic pain is also reduced he now rates his pan a 5/10. He reports a decrease in anxiety, he has not had a panic attack since starting medication. The patient c/o memory problems. He was talking to his wife and was to meet her at 1100, he started to do things and loss track of time and was late. I did discuss with the patient that he most likely is not used to doing things in his day so hours could feel like hours. The patient was looking for an apartment for his granddaughter, he saw a \"glider\" out by the trash, he used to fix furniture but really has not done this in about 6 years. He took the glider home and is now working on restoring it. He is able to now sleep throughout the night. The patient and his wife now decided to travel so they will be going to [facility]. He reports now feeling good, and is happy that he is getting back into his \"old routine\". The patient is also working with PT, and he reports that this is also helping his gait, balance and pain.         Patient Active Problem List    Diagnosis Date Noted     Parkinsonian features 05/18/2017     Chest pain 07/21/2014     Right shoulder pain 07/21/2014     CAD (coronary artery disease) 07/21/2014     Pre-syncope 07/21/2014     Acute [id number] Myocardial Infarction - Initial Care ([place])      Coronary Artery Disease      Peripheral Vascular Disease      Male Erectile Disorder      Lumbar Disc Degeneration      Sciatica      Intervertebral Disc Degeneration      Generalized Osteoarthritis Of Multiple Sites      Past Medical History:   Diagnosis Date     Arthritis      Back pain      Chest pain      Coronary artery disease      Diabetes mellitus     type II     Hearing loss of both ears      Heart attack 2/14/2014     Past Surgical History:   Procedure Laterality Date     APPENDECTOMY  1950     CARPAL TUNNEL RELEASE Right 10/29/2014 "     CORONARY STENT PLACEMENT  4/30/2014     decompression l3       EYE SURGERY       FOOT SURGERY       HERNIA REPAIR       REPLACEMENT TOTAL KNEE       spinal fusion L4-L5       Family History   Problem Relation Age of Onset     Cancer Mother      Cancer Father      Current Outpatient Prescriptions   Medication Sig Dispense Refill     acetaminophen (TYLENOL) 500 MG tablet Take 1,000 mg by mouth every 6 (six) hours as needed for pain.        aspirin 81 MG tablet Take 81 mg by mouth daily.       coQ10, liposomal ubiquinol, 8 mg/mL Liqd Take 10 mL by mouth.       DULoxetine (CYMBALTA) 30 MG capsule Take 1 capsule (30 mg total) by mouth daily. (Patient taking differently: Take 40 mg by mouth 2 (two) times a day. ) 90 capsule 3     fexofenadine (ALLEGRA) 180 MG tablet Take 180 mg by mouth daily as needed.        fluticasone (FLONASE) 50 mcg/actuation nasal spray 2 sprays into each nostril daily. 16 g 12     GLUC/SOO-MSM#1/VIT C/CLAUDETTE/BOR (GLUCOSAMINE-CHOND-MSM COMPLEX ORAL) Take 1 each by mouth daily.       MV-MN/FA/LYCOPENE/LUT/HB#185 (DIABETIC SUPPORT FORMULA ORAL) Take 1 Package by mouth once daily. Takes a diabetic vitamin/supplement thomas- gets at [employer]       naproxen (NAPROSYN) 500 MG tablet Take 1 tablet (500 mg total) by mouth 3 (three) times a day with meals. 90 tablet 3     nitroglycerin (NITROSTAT) 0.4 MG SL tablet Place 1 tablet (0.4 mg total) under the tongue every 5 (five) minutes as needed for chest pain. 25 tablet 3     omeprazole (PRILOSEC) 40 MG capsule Take 1 capsule (40 mg total) by mouth as needed. Before a meal 90 capsule 0     pimavanserin (NUPLAZID) 17 mg Tab tablet Take 2 tablets (34 mg total) by mouth daily. 60 tablet 8     pregabalin (LYRICA) 100 MG capsule Take 1 capsule (100 mg total) by mouth 2 (two) times a day. 60 capsule 2     traZODone (DESYREL) 50 MG tablet Take 50 mg by mouth at bedtime.       VIT C/VIT E/LUTEIN/MIN/OMEGA-3 (OCUVITE ORAL) Take 1 capsule by mouth daily.       No current  facility-administered medications for this encounter.        Allergies   Allergen Reactions     Codeine Itching     And sweating     Ibuprofen Itching     Morphine      Excessive sweating     Tagamet [Cimetidine]      Vicodin [Hydrocodone-Acetaminophen] Other (See Comments)     Profuse sweating     Social History     Social History     Marital status:      Spouse name: N/A     Number of children: N/A     Years of education: N/A     Occupational History     Not on file.     Social History Main Topics     Smoking status: Former Smoker     Quit date: 5/4/1964     Smokeless tobacco: Never Used     Alcohol use No     Drug use: No     Sexual activity: Not on file     Other Topics Concern     Not on file     Social History Narrative       The following portions of the patient's history were reviewed and updated as appropriate: allergies, current medications, past family history, past medical history, past social history, past surgical history and problem list    Objective:     Vitals:    08/18/17 1124   BP: (!) 152/95   Pulse: 76   Weight: 202 lb (91.6 kg)        Total time spent with the patient today was 30 with greater than 50% of the time spent in counseling and care coordination. I asked him to call with any questions or concerns and will see him again in clinic in about 2 weeks. We discussed the risks and benefits of the medication including risk of worsening depression with medication adjustments and even the possibility of emergence of suicidality    Mental Status Examination  Patient is casually dressed, appropriately groomed and seated for evaluation.  No evidence of acute psychological distress.  His behavior is socially appropriate and he is cooperative with questioning.  Affect was brighter today and mood was pleasant.  He is fully alert and oriented.  Thoughts are expressed in a linear fashion and comprehension appears normal.  Speech is spontaneous with a slight reduction in volume, but normal rate.   Content without evidence of auditory or visual hallucinations.  No delusional ideation.  Gen. fund of knowledge, insight and memory were grossly normal.

## 2021-06-12 NOTE — PROGRESS NOTES
Physical Therapy  Physical Therapy Daily Outpatient Documentation    MEDICARE      Rx Units: 3TE, 1MT    Total OP Minutes: 55    Treatment #: 3/12    Pain: 5-6/10  Location: Low back   Intervention: See below    Subjective: Pt has no major complaints this date.      Therapeutic Exercise  Total Minutes: 40    -NuStep x 10 min, workload #6, avg METS:  3.4, SPM: 69    -**Prone on elbows x 10 reps with 5 sec hold; pt was limited with range and c/o increased pain; increased cues to not allow hips to come off the mat.  -Manual SLR with contract-relax on ea LE x 3 min total; improved ROM bilaterally with R LE to ~70 degrees   -Manual R piriformis stretch 2 x 45 sec  -Reviewed pelvic tilts x 5 reps; cues to hold tilt for 10 seconds; overall good form.  -Hooklying lower trunk rotation x 20 reps (10 ea side); cues to stay within pain-free range)  -Bridging 2 x 10 reps with UE assist and DF; improved hip range this date with better glute elevation  -Multifidus isometric x 8 reps ea side with 5 sec hold.  Cues for coordination of the exercise.  -**S/L clams x 10 reps ea side.  Initial verbal and tactile cues to engage glute med to assist with prevention of pelvis from rolling backwards      Neuro Re-Ed/Balance  Total Minutes:       Manual Therapy  Total Minutes: 15  -Bilateral lumbar roll gr III-IV as tolerated with progressive stretch as tolerated, cavitation on the left side  -Prone PA's gr II-III as tolerate with c/o pain bilaterally  -Inferior glide of R hip, gr III-IV as tolerated.  HEP  Posterior pelvic tilts  Bridging  Hooklying lower trunk rotation  Prone on elbows  S/L clams     Assessment/Plan for week of 7/17/2017-7/21/2017:  See previous note for weekly AP.  Issue NAVID next Rx session.    Additional Notes:
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
## 2021-06-12 NOTE — PROGRESS NOTES
"Physical Therapy  Physical Therapy Daily Outpatient Documentation    MEDICARE      Rx Units: 3TE, 1MT     Total OP Minutes: 55     Treatment #: 5/12    Pain: 4-5/10  Location: Low back   Intervention: See below    Subjective: Pt reports improved back pain since last session and mild muscle soreness for a few minutes.  Increased movements and exercise helped with his soreness.  Pt reports compliance with the HEP.        Therapeutic Exercise  Total Minutes: 45     -Prone on elbows x 10 reps with 5 sec hold; VC to keep hips on mat  -Manual SLR with contract-relax on ea LE x 1.5 min total; improved ROM bilaterally with R LE to 75 degrees, L LE to 73 degrees  -Manual B piriformis stretch 2 x 45 sec  -Hooklying lower trunk rotation x 10 reps (5 ea side); cues to stay within pain-free range and VC for breathing  -Bridging 1 x 5 reps with UE assist, DF, and VC for cues to engage gluts, Pt reported L anterior thigh discomfort therefore discontinued and was assisted to sitting and standing to assist with management of pain  -S/L clams x 10 reps ea side.  Good form this date  -Seated nerve glides x 10 reps B - VC for added DF to assist with glide.    -Standing mini squats x 10 reps - VC to keep knees behind toes and sit back into squat.      Neuro Re-Ed/Balance  Total Minutes:       Manual Therapy  Total Minutes: 10  -Prone lumbar PA's gr II-III as tolerate with c/o pain bilaterally L>R  -Prone bilateral hip anterior capsule mobilizations gr II-III as tolerated; both are significantly limited    HEP  Posterior pelvic tilts  Bridging  Hooklying lower trunk rotation  Prone on elbows  S/L clams     Assessment/Plan for week of 7/24/2017-7/28/2017:  Patient tolerated manual techniques this date, however developed \"nerve pain\" in his L leg in the hip and thigh region.   Overall he does note that the frequency of the L LE nerve pain has decreased since beginning thearpy. See previous note for this week's A/P    Additional " Notes:

## 2021-06-12 NOTE — PROGRESS NOTES
Physical Therapy  Physical Therapy Daily Outpatient Documentation    MEDICARE      Rx Units: 3TE, 1MT     Total OP Minutes: 55     Treatment #: 6/12-reassessment completed this date, 8/2/2017    Referring Provider:  KEVIN Avitia    Pain: 7/10  Location: Low back   Intervention: See below    Subjective: Pt reports that his back pain was exacerbated after the last Rx session up to a 9-10/10 for 2-3 days after the session.        Therapeutic Exercise  Total Minutes: 40     NuStep x 8 min, workload #5, avg METS:  2.8, SPM:  65    -Manual SLR with contract-relax on ea LE x 1.5 min total; improved ROM bilaterally with R LE to 60 degrees (PT purposely limits range so as not to exacerbate symptoms), L LE to 75degrees  -Manual L piriformis stretch 2 x 45 sec  -Bridging x 5 reps with UE assist, DF, and VC for cues to engage gluts.  Pt only able to complete 5 reps due to increased L hamstring pain/cramping this date.    -S/L clams x 15 reps ea side with peach thera band.  Good form this date  -Supine Multifidus isometric x 5 reps ea side with 5 sec hold.  Minimal cues for UE/LE coordination  -Standing mini squats 2 x 10 reps - VC to keep knees behind toes and sit back into squat.      Neuro Re-Ed/Balance  Total Minutes:       Manual Therapy  Total Minutes: 15  -Prone lumbar PA's gr II-III as tolerate with c/o pain bilaterally L>R  -Prone bilateral hip anterior capsule mobilizations gr II-III as tolerated; both are significantly limited  -Prone R piriformis STM with trigger point release as tolerated.    HEP  Posterior pelvic tilts  Bridging  Hooklying lower trunk rotation  Prone on elbows  S/L clams     Assessment/Plan for week of 7/31/2017-8/4/2017:  Pt reassessed this date per  guidelines based on the pt's low back pain.  When the pt initially was evaluated his back pain was 8/10 when ambulating.  This date the pt reports his pain as 7/10.  In general, there has been progress towards the pt's pain as he was able to  report 4-5/10 low back pain with movement.  The pt can become easily exacerbated with progression of exercises so it has been a balance of finding the balance of progression but not exacerbating his pain to a 9-10/10.  The G-codes have been updated and remain appropriate.  This note has been sent to the referring provider per  guidelines for communication purposes.  Plan to assess and monitor pt's response to today's treatment session and add quadruped exercises and mini squats on foam for core/LE strengthening if able next Rx session.     Additional Notes:

## 2021-06-12 NOTE — PROGRESS NOTES
"Physical Therapy  Physical Therapy Daily Outpatient Documentation    MEDICARE      Rx Units: 3TE, 1MT     Total OP Minutes: 55     Treatment #: 7/12-reassessment completed on 8/2/2017    Pain: 4-5/10  Location: Low back   Intervention: See below    Subjective: Pt reports that his back pain felt the best it has in \"several days\" after last treatment session.        Therapeutic Exercise  Total Minutes: 40     NuStep x 10 min total, level #4 x 5 min, level #5 x 5 min, avg METS:  2.6, SPM:  82    -Manual SLR with contract-relax on ea LE x 1.5 min total; improved ROM bilaterally with R LE to 60 degrees (PT purposely limits range so as not to exacerbate symptoms), L LE to 75degrees  -Manual L piriformis stretch 2 x 45 sec  -Quadruped alt UE/LE x 10 reps (5 ea side), Cues to engage glutes, SBA for stability  -S/L clams x 20 reps ea side with peach thera band.  Good form this date  -Supine Multifidus isometric x 10 reps ea side with 5 sec hold.  No additional cues needed for technique.  -Standing mini squats 2 x 10 reps - VC to keep knees behind toes and sit back into squat.      Neuro Re-Ed/Balance  Total Minutes:       Manual Therapy  Total Minutes: 15  -Prone lumbar PA's gr II-III as tolerate with c/o pain bilaterally L>R  -Prone bilateral hip anterior capsule mobilizations gr II-III as tolerated; both are significantly limited  -Prone R piriformis STM with trigger point release as tolerated.    HEP  Posterior pelvic tilts  Bridging  Hooklying lower trunk rotation  Prone on elbows  S/L clams     Assessment/Plan for week of 7/31/2017-8/4/2017:  See previous note for weekly AP     Additional Notes:                "

## 2021-06-13 NOTE — PROGRESS NOTES
".  Assessment:     1. Parkinson like symptoms  2. Mild cognitive impairment  3. Dizziness  4. Orthostatic B/P    Plan:     1. Monitor sleep  2. Neuropsychological testing  3. Restart Nuplazid and Sinemet  4. Return to clinic in 4 weeks    The patient returns to the memory loss clinic, he was last seen by me on 8/18/17, where no medication changes were made. The patient just got back from vacation in [facility], he reports that vacation went well. He stated that his pain has significantly reduced since doing PT. He started to have hallucinations and vivid dreams again after returning from [facility] so he restarted the Nuplazid. Once the antibiotic for his eye was stopped the dryness stopped. He has not had any problems since restarting the Nuplazid and his dreaming and hallucinations have stopped. He feels his mind is a bit slower in the afternoon but he just restarted the Nuplazid so I am hoping his day time tiredness will improve. He reports that his anxiety is still better. His tremor has increased and this is very bothersome to him, we will do another retrial of Sinemet to see if his will improve his Parkinson like features. Patient complains of dizziness and weakness, I will do an EKG to make sure there is not changes in his heart conditions.    Subjective:       Familia Sales is a 86 y.o. male who initially presented to the memory loss clinic on 5/7/15  for a full diagnostic evaluation. PCP completed a Mini-Mental exam in office (28/30), but thought he should be evaluated in this office due to changes in behavior. Wife explained that she was concerned about the patient's behavior and cognition, about 5 years prior the patient began to show mood swings and extreme anger.  The patient experienced similar behavior in the past, but he was given, they believe, an antidepressant which resulted in great improvement in behavior.  The patient also saw a psychologist at that time and was told that he had \"deep psychological " "problems\", although wife reported that he was \"perfect \", he had his \"issues\". Medication was stopped after several years and the patient continued to do well up until about 5 prior. The patient was found to be \"window peeping\" a neighbor.  A couple of years after the anger started, the patient was found to be \"addicted\" to pornography and dating web sites.  He was constantly interacting with people online until his wife discovered what he had been doing. He was embarrassed and remorseful, wife moved out and was gone for 3 months.  During that time, wife reported that the patient did not function well.  When she came back she found rotting food in the refrigerator, broken things in the home, and that he spent most of his time lying around in his pajamas.  Once she moved back in, she assumed all care of the home like she used to and he seemed to be happy. She believed that he was not obsessing about pornography or dating web sites anymore, but she wouldn't be surprised if he looked occasionally \"because he is a carol and did that from time to time when he was younger\".  The patient continued with mood swings and extreme anger that seems to come out of the blue, he had \"another personality in his head and argues with me in his head and thought the arguments were real\".  He had never been physically aggressive with his wife, but was yelling at her frequently. The patient and his wife were on public transportation when the patient apparently became extremely angry when he saw a 16-year-old boy sitting in a seat meant for handicapped people.  He began yelling at the boy and telling him how inappropriate it was, especially when a handicapped adult came on the bus and needed to sit somewhere else.  There were many seats available for this person, even other handicapped spots next to the boy.  Wife was embarrassed. The patient still was an empathetic person, but his wife believed that he did not recognize when she was " "upset like he used to. There were no stereotypical behaviors or new fixations or habits with food. Increased anger started 5 years ago, patient's wife also states the patient's cognition changed as well.  She says that it has worsened to a point where he has difficulty learning new things and is often times forgetful, specifically with recent information.  He has difficulty understanding when events from the past happen, so his standard response is \"40 or 50 years ago\".  She also says that he seems to have difficulty with directions and has gotten lost while driving.  She also says that he drives erratically at times, weaving through traffic, running red lights and stopping the car at the very last moments before hitting something.  The patient does not have any specific chores or responsibilities around the home, but she has noted that he seems to be apathetic and shows a lack of interest in doing anything.  At times he doesn't even change out of his pajamas.     Patient reports that he had \"flares of anger\" and trouble with memory for about a year.  He had difficulty with anger \"in the 1980's\" and he was given a medication that really helped, but he no longer takes it.  He felt incredibly sensitive to comments made by his wife and he either argues with her or dismisses her altogether.  When describing the incident with the handicapped seating, the patient said that he was yelling at \"kids\" and that the bus was totally full so the handicapped person didn't have anywhere to sit.  He was surprised by how angry he became, but he said that this reaction might be normal for someone else.  Regarding his memory, he reports that he had difficulty with names and word finding and it takes him longer to recall information.  At times his wife has to repeat information for him because he either forgets or he can't understand what she is talking about.  He thinks that his anger and memory difficulties are getting worse, but he " denies any difficulty with functionality. He tells me that his driving was good, but he admitted that his wife would likely disagree with him. Neuropsychological testing was completed on 7/27/15 and the DIAGNOSTIC IMPRESSIONS was Cognitive complaints with normal neuropsychological evaluation, Mood disorder, unspecified, Tobacco use disorder, in full sustained remission, 8/3/15 History, physical examination and testing was suspicious for a possible emerging neurodegenerative illness, yet neuropsychological testing was normal.  Patient had some difficulty with impulse control and complaints of short-term memory loss. Prozac 20 mg was started. 9/10/15 The patient and wife noted a great change in the patient's demeanor since starting on Prozac.  He was much more comfortable. 9/16/16   Patient reported that he was having leg cramps so he discontinued Prozac in April and has not experienced leg cramps since.  Unfortunately since that time he had become increasingly sensitive and was feeling very down emotionally.  He was impatient with his wife and they were arguing more. His sleep was interrupted due to frequent waking then had difficulty falling back to sleep Cymbalta 60 mg was started. 9/30/16 he believes that 30 mg was a better dose.  He feels a little bit more activated at 60 mg, but 30 mg he felt more comfortable.  5/10/17  Patient presented to memory loss clinic to ask if there was any advancement in his cognitive impairment. When :MA took the patient's B/P it was 212/88 sitting 147/77 standing  I was questioning the patient and he suddenly was pale and diaphoretic. I  Retook B/P 163/85. The patient reported that he had a hard time breathing. He also had tingling down both arms. Patient has a history of CAD, 2 previous MI with stents placed, anxiety/depression, and diabetes. He reported that he felt dizzy and confused since he got up this morning. No chest pain. Patient reported increased SOB, internal code 9  called. House doctor assessed and agreed that patient should be sent for further testing. Patient sent to [facility].  5/18/17 The patient believes he may have Lewy Body, he also complains about a tremor, on the inside and in his hands. He also believes that his anxiety is getting worse thinking about this and his anxiety is increasing his cognitive deficits. I will have the patient take another neuro psych and we will do another CT scan. I believe that the patient's anxiety is not under good control. We discussed possible treatment plans, The patient would first like to rule out Parkinson's Disease, the patient does have a masked face, decreased eye blinking, rigidity, it is difficult to  his gait due to his chronic pain. I will do the Sinemet challenge on the patient. He will take a half of a tab of Sinemet IR 25/100 TID. I did discuss all potential benefits and risks to the patient. The patient verbalized understanding. I have asked the patient to keep a log and write down things that get better or things that get worse as we try different treatment plans.  6/5/17 The patient reports that the Sinemet did not help his symptoms. He reports that he does not sleep. He has very vivid dreams, he will also see things in his peripheral and when he turns the object is not there. We will start the patient on Nuplazid, the patient has Parkinsonism. I do believe that the patient does suffer from anxiety but he would like to see if we can help him with sleep first, before treating his anxiety. Patient and wife are reading a lot on the Internet on Lewy Body, the patient believes he does have the disease. I discussed Lewy Body and he should monitor and document his symptoms. I do believe that the patient's anxiety does play into the patient's symptoms. We will try different treatment plans.    6/21/17  the patient took the medication one night and felt dizzy all day the following day. I did reduce the dose to 1 tab every  "night, to see if this will help with the patient with delusions, shadowing, and REM sleep disorder. The patient reports \"feeling shaky\" all over even on the inside. A long discussion was held with the patient about differential diagnosis, we are trying different treatment plans to see what helps with the patient's symptoms. Patient is keeping a log of symptoms for better analysis. Given the patient had no effect from Sinemet, there is a decreased chance that the patient has Parkinson's Disease , but the patient does have Parkinsonisms.   7/19/17 He reports sleeping much better. His tremor is reduced. His chronic pain is also reduced he now rates his pan a 5/10. He reports a decrease in anxiety, he has not had a panic attack since starting medication. The patient c/o memory problems. He was talking to his wife and was to meet her at 1100, he started to do things and loss track of time and was late. I did discuss with the patient that he most likely is not used to doing things in his day so hours could feel like hours. The patient was looking for an apartment for his granddaughter, he saw a \"glider\" out by the trash, he used to fix furniture but really has not done this in about 6 years. He took the glider home and is now working on restoring it. He is able to now sleep throughout the night. The patient and his wife now decided to travel so they will be going to [facility]. He reports now feeling good, and is happy that he is getting back into his \"old routine\". The patient is also working with PT, and he reports that this is also helping his gait, balance and pain.   8/18/17  The patient had severe dry eyes so he stopped the Nuplazid thinking that was causing it. He later stated that he has an infection in his eye so he has been putting antibiotic drops into his eyes, which is what is probable causing the dry eyes. He reports that he is doing pretty good. He is still doing PT with is helping him with his gait and " chronic pain.       Patient Active Problem List    Diagnosis Date Noted     Parkinsonian features 05/18/2017     Chest pain 07/21/2014     Right shoulder pain 07/21/2014     CAD (coronary artery disease) 07/21/2014     Pre-syncope 07/21/2014     Acute Ocysdc-vycipq-plozwh Myocardial Infarction - Initial Care ([place])      Coronary Artery Disease      Peripheral Vascular Disease      Male Erectile Disorder      Lumbar Disc Degeneration      Sciatica      Intervertebral Disc Degeneration      Generalized Osteoarthritis Of Multiple Sites      Past Medical History:   Diagnosis Date     Arthritis      Back pain      Chest pain      Coronary artery disease      Diabetes mellitus     type II     Hearing loss of both ears      Heart attack 2/14/2014     Past Surgical History:   Procedure Laterality Date     APPENDECTOMY  1950     CARPAL TUNNEL RELEASE Right 10/29/2014     CORONARY STENT PLACEMENT  4/30/2014     decompression l3       EYE SURGERY       FOOT SURGERY       HERNIA REPAIR       REPLACEMENT TOTAL KNEE       spinal fusion L4-L5       Family History   Problem Relation Age of Onset     Cancer Mother      Cancer Father      Current Outpatient Prescriptions   Medication Sig Dispense Refill     acetaminophen (TYLENOL) 500 MG tablet Take 1,000 mg by mouth every 6 (six) hours as needed for pain.        aspirin 81 MG tablet Take 81 mg by mouth daily.       coQ10, liposomal ubiquinol, 8 mg/mL Liqd Take 10 mL by mouth.       DULoxetine (CYMBALTA) 30 MG capsule Take 1 capsule (30 mg total) by mouth daily. (Patient taking differently: Take 40 mg by mouth 2 (two) times a day. ) 90 capsule 3     fexofenadine (ALLEGRA) 180 MG tablet Take 180 mg by mouth daily as needed.        fluticasone (FLONASE) 50 mcg/actuation nasal spray 2 sprays into each nostril daily. 16 g 4     GLUC/SOO-MSM#1/VIT C/CLAUDETTE/BOR (GLUCOSAMINE-CHOND-MSM COMPLEX ORAL) Take 1 each by mouth daily.       MV-MN/FA/LYCOPENE/LUT/HB#185 (DIABETIC SUPPORT FORMULA  ORAL) Take 1 Package by mouth once daily. Takes a diabetic vitamin/supplement thomas- gets at [facility]       naproxen (NAPROSYN) 500 MG tablet Take 1 tablet (500 mg total) by mouth 3 (three) times a day with meals. 90 tablet 3     nitroglycerin (NITROSTAT) 0.4 MG SL tablet Place 1 tablet (0.4 mg total) under the tongue every 5 (five) minutes as needed for chest pain. 25 tablet 3     omeprazole (PRILOSEC) 40 MG capsule Take 1 capsule (40 mg total) by mouth as needed. Before a meal 90 capsule 0     pimavanserin (NUPLAZID) 17 mg Tab tablet Take 2 tablets (34 mg total) by mouth daily. 60 tablet 8     pregabalin (LYRICA) 100 MG capsule Take 1 capsule (100 mg total) by mouth 2 (two) times a day. 60 capsule 2     traZODone (DESYREL) 50 MG tablet Take 50 mg by mouth at bedtime.       VIT C/VIT E/LUTEIN/MIN/OMEGA-3 (OCUVITE ORAL) Take 1 capsule by mouth daily.       carbidopa-levodopa (SINEMET)  mg per tablet Take 0.5 tablets by mouth 3 (three) times a day. 45 tablet 1     No current facility-administered medications for this encounter.        Allergies   Allergen Reactions     Codeine Itching     And sweating     Ibuprofen Itching     Morphine      Excessive sweating     Tagamet [Cimetidine]      Vicodin [Hydrocodone-Acetaminophen] Other (See Comments)     Profuse sweating     Social History     Social History     Marital status:      Spouse name: N/A     Number of children: N/A     Years of education: N/A     Occupational History     Not on file.     Social History Main Topics     Smoking status: Former Smoker     Quit date: 5/4/1964     Smokeless tobacco: Never Used     Alcohol use No     Drug use: No     Sexual activity: Not on file     Other Topics Concern     Not on file     Social History Narrative       The following portions of the patient's history were reviewed and updated as appropriate: allergies, current medications, past family history, past medical history, past social history, past surgical history  and problem list    Objective:     Vitals:    09/21/17 0958   BP: 168/86   Pulse: 72   Weight: 205 lb (93 kg)        Total time spent with the patient today was 30 with greater than 50% of the time spent in counseling and care coordination. I asked him to call with any questions or concerns and will see him again in clinic in about 2 weeks. We discussed the risks and benefits of the medication including risk of worsening depression with medication adjustments and even the possibility of emergence of suicidality    Mental Status Examination  Patient is casually dressed, appropriately groomed and seated for evaluation.  No evidence of acute psychological distress.  His behavior is socially appropriate and he is cooperative with questioning.  Affect was brighter today and mood was pleasant.  He is fully alert and oriented.  Thoughts are expressed in a linear fashion and comprehension appears normal.  Speech is spontaneous with a slight reduction in volume, but normal rate.  Content without evidence of auditory or visual hallucinations.  No delusional ideation.  Gen. fund of knowledge, insight and memory were grossly normal.   .

## 2021-06-13 NOTE — TELEPHONE ENCOUNTER
PHYSICIAN DISCHARGE SUMMARY                                                                        [facility]    Patient Identification:  Name: Drew Day  Age: 67 y.o.  Sex: male  :  1953  MRN: [id number]  Primary Care Physician: Provider, No Known    Admit date: 2021  Discharge date and time: td     Discharged Condition: good    Discharge Diagnoses:   Recurrent GI bleed: None since admission and fairly unremarkable EGD.  Iron deficiency: 2 doses of intravenous iron sucrose given, 3 mg each.   DVT left lower extremity:  Resume Lovenox.    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  Presents from the skilled nursing facility with reported history of coffee-ground emesis.  Please H&P for full details.  Previously his hemoglobin on presentation was 9.9 then 11.3 then 9.9 again.  I suspect 11.3 is a lab error.  He did drop to as low as 7.5.  At least a portion of this was likely hydration.  He then began to improve and is down from there.  Transfusions were not needed.  All this there is no further coffee-ground emesis or was any melena.  His Lovenox was put on hold on presentation.  Repeat Dopplers formed to assess the status of his DVT which appears not to change much.  He did undergo EGD which was fairly unremarkable.  No evidence of any recent bleeding no ongoing bleeding.  Lovenox was resumed.  He does have low iron levels and I have given him 2 doses of intravenous iron while in hospital.  Presently he is cleared for discharge back to skilled nursing facility.      Consults:     Consults     Date and Time Order Name Status Description    2021  4:56 PM Inpatient  Patient called with CT order and the number to call. Anna ADORNO RN     Gastroenterology Consult Completed     6/9/2021  1:11 PM [facility] (on-call MD unless specified) Details Completed     6/9/2021  1:11 PM Gastroenterology (on-call MD unless specified) Completed     5/29/2021  4:28 PM Inpatient Vascular Surgery Consult Completed     5/27/2021  9:32 PM Inpatient Gastroenterology Consult Completed     5/27/2021  5:24 PM [facility] (on-call MD unless specified) Details Completed             Discharge Exam:  Afebrile vital signs stable.  Vaginally nourished gentleman in no apparent distress.  Lungs clear to auscultation.  None.  Heart regular rate and rhythm  Abdomen benign  Extremities no concerns edema.  Neurologically he remains nonverbal.     Disposition:  Skilled nursing facility    Patient Instructions:      Discharge Medications      New Medications      Instructions Start Date   pantoprazole 40 MG EC tablet  Commonly known as: PROTONIX   40 mg, Oral, Daily         Changes to Medications      Instructions Start Date   Phenytoin Infatabs 50 MG chewable tablet  Generic drug: phenytoin  What changed:   · how much to take  · how to take this  · when to take this  · additional instructions   Take 4 tablets bid         Continue These Medications      Instructions Start Date   acetaminophen 325 MG tablet  Commonly known as: TYLENOL   650 mg, Oral, Every 6 Hours PRN      atorvastatin 40 MG tablet  Commonly known as: LIPITOR   40 mg, Oral, Nightly      benztropine 0.5 MG tablet  Commonly known as: COGENTIN   0.5 mg, Oral, 2 Times Daily      calcium carbonate 500 MG chewable tablet  Commonly known as: TUMS   1 tablet, Oral, 2 Times Daily      enoxaparin 30 MG/0.3ML solution syringe  Commonly known as: LOVENOX   30 mg, Subcutaneous, Every 12 Hours      LORazepam 0.5 MG tablet  Commonly known as: ATIVAN   0.25 mg, Oral, 2 Times Daily      Multivitamin Adult tablet tablet  Generic drug: multivitamin with minerals   1 tablet, Oral, Daily      REMEDY CALAZIME EX   Apply externally, 2 Times Daily, To  buttocks      risperiDONE 0.5 MG tablet  Commonly known as: risperDAL   0.5 mg, Oral, 2 Times Daily      sucralfate 1 g tablet  Commonly known as: CARAFATE   1 g, Oral, 4 Times Daily Before Meals & Nightly      tamsulosin 0.4 MG capsule 24 hr capsule  Commonly known as: FLOMAX   0.4 mg, Oral, Nightly      thiamine 100 MG tablet  Commonly known as: VITAMIN B-1   100 mg, Oral, Daily      zonisamide 100 MG capsule  Commonly known as: ZONEGRAN   100 mg, Oral, Daily           Diet Instructions     Diet: Dysphagia; Pudding Thick Liquids; Pureed      Discharge Diet: Dysphagia    Fluid Consistency: Pudding Thick Liquids    Pureed Options: Pureed    Pureed; Pudding Thick        No future appointments.  Additional Instructions for the Follow-ups that You Need to Schedule     Discharge Follow-up with PCP   As directed       Currently Documented PCP:    Provider, No Known    PCP Phone Number:    [phone]     Follow Up Details: 1 week           Follow-up Information     Provider, No Known .    Why: 1 week  Contact information:  [place] [address]  [phone]                 Discharge Order (From admission, onward)     Start     Ordered    06/13/21 1417  Discharge patient  Once     Expected Discharge Date: 06/13/21    Discharge Disposition: Skilled Nursing Facility (DC - External)    Physician of Record for Attribution - Please select from Treatment Team: ABDIEL LITTLE [[id number]]    Review needed by CMO to determine Physician of Record: No       Question Answer Comment   Physician of Record for Attribution - Please select from Treatment Team ABDIEL LITTLE    Review needed by CMO to determine Physician of Record No        06/13/21 1419                  Total time spent discharging patient including evaluation,post hospitalization follow up,  medication and post hospitalization instructions and education total time exceeds 30 minutes.    Signed:  Abdiel Little MD  6/13/2021  14:19  EDT    EMR Dragon/Transcription disclaimer:   Much of this encounter note is an electronic transcription/translation of spoken language to printed text. The electronic translation of spoken language may permit erroneous, or at times, nonsensical words or phrases to be inadvertently transcribed; Although I have reviewed the note for such errors, some may still exist.

## 2021-06-14 NOTE — PROGRESS NOTES
NEUROPSYCHOLOGICAL EVALUATION  [facility]: [facility]    NAME: Familia Sales    YOB: 1931   AGE: 86  EDU: SAUL (11)  DATE OF EVALUATION: 11/29/2017    REASON FOR REFERRAL:  Mr. Sales is a 86 y.o., right-handed,  male presenting with concerns about cognitive functioning in the context of a complex medical history including ASCVD (MI and stenting ×2), type 2 diabetes mellitus, chronic back pain with implanted nerve stimulator, depression, and remote tobacco abuse with more recent diagnosis of parkinsonism. He was referred for updated neurocognitive evaluation by his provider, KEVIN Avitia to assist with differential diagnosis and care planning.     DIAGNOSTIC SUMMARY:  Results of testing indicate that Mr. Sales is a gentleman of estimated average premorbid intellectual functioning whose performance on this evaluation reflects no evidence of cognitive impairment. Very subtle cognitive weaknesses (i.e., borderline to low average performance) were evident on measures of deductive reasoning, inhibition and cognitive efficiency (though notably variable ranging from borderline impaired to high average). These weaknesses were evident in the context of otherwise intact (average and often well above) performance on measures of basic attention, learning and memory, language, visuospatial skills and many aspects of executive functioning (complex attention, visuospatial planning and organization, working memory). Fine motor dexterity was measured in the average range bilaterally. Finally, on a self-report questionnaire, he endorsed mild symptoms of depression.     As compared to testing in 2015, Mr. Sales exhibits stable performances on measures of attention (average), working memory (average), cognitive efficiency (variable: borderline to high average), phonemic fluency (average), fund of general knowledge (average), pattern completion (average), prose learning and memory (high average),  wordlist memory (superior), nonverbal learning and memory (superior), complex attention (average), deductive reasoning (low average to average), and visuospatial planning and organization (within normal limits). Performance improvements over the last two years were evident on measures of verbal abstraction (from average to high average), confrontation naming (from high average to superior), semantic fluency (from mildly impaired to average), and wordlist learning (from average to superior). Finally, very subtle declines were evident on measures of block construction (from high average to average) and inhibition (from average to low average).     Overall, while some subtle variability is evident over the last 2 years, Mr. Sales's cognitive profile is remarkably stable and reflects no evidence of acquired cognitive dysfunction.  Notably, despite subjective experience of memory decline, his memory scores across 3 different measures represent some of his strongest performances with scores in the high average to superior range (84th percentile and above).  Rather than a degenerative process, it is likely that both Mr. Sales and his wife have noticed memory declines that are associated with normal aging.  Even so, they should be reassured, however, that his memory profile is very strong for his age.  As Dr. Akers highlighted in 2015, there are subtle fluctuations in his profile but nothing consistent enough to suggest any type of degenerative process, especially given the remarkable stability over the last 2 years.  Rather it seems that ongoing mood symptoms and the effects of normal aging appear to be impacting his personality over time.    Mr. Sales endorsed only mild depressive symptoms during the current evaluation, but on interview he seemed to indicate that he feels rather down and unmotivated.  His wife agreed noting that he seems disengaged.  Mr. Sales himself admits that he does not enjoy activities as much as  he used to (such as traveling).  It is thus recommended that he consider re-engaging in psychotherapy services to potentially address some of his mood symptoms.  He had previously worked with Dr. Darling Isbell here at [facility].  She would likely be able to see him again, but if he prefers, he could find a provider closer to home if that would encourage him to go more regularly.  Alternatively, or in addition, adjustment/ alternative psychotropic medications could be considered to help improve his mood.    It is possible, that mild depressive symptoms are impacting Mr. Sales's cognitive functioning day to day and keeping him from performing at the level of his strong capabilities.  If his mood symptoms are better controlled, he may in turn experience improvement in his overall cognitive functioning.  Regardless, Mr. Alfaros cognitive skills on current testing are intact for his age.  There are no concerns with regard to to his ability to manage his personal affairs including medications and finances, or with driving.    DIAGNOSTIC IMPRESSIONS:  No Cognitive Diagnosis    RECOMMENDATIONS:    Given his symptoms of depression, it is recommended that Mr. Sales consider re-engaging in psychotherapy services to address his mood symptoms. He previously saw Dr. Darling Isbell here at [place] and he may wish to consider re-engaging with her.  However, if he prefers, he may contact his insurance provider and find a therapist closer to home. The most important thing is that he be willing to go consistently and thus finding someone that is convenient for him may be the most effective way to assist with this.    Under the guidance of his physician, It is recommended that regular exercise be re-integrated into Mr. Sales's routine as it will likely benefit him cognitively and emotionally as well as physically. Given his balance issues, low impact options (i.e., water based exercises) would be appropriate.     In his daily life,  Mr. Sales will continue to benefit from the use of compensation techniques. That is, he may find it helpful to post reminder notes around the house, make lists, and carry a small calendar so that he can feel more comfortable and confident in his ability to remember information. A daily planner could also be used as a memory book where important information is recorded and organized for future reference.       Mr. Sales should be reassured that his mental faculties are well preserved    There are no concerns with Mr. Sales s current ability to drive, or manage his finances or personal affairs.    Should issues of diagnosis or dispositional planning arise or should Mr. Sales experience a decline in his cognition, re-evaluation is recommended. The current results may serve as an updated baseline to which future comparisons can be made.     FEEDBACK:  Mr. Sales will receive the results of this evaluation via a formal feedback appointment with me on December 12th.     Thank you for allowing me to participate in Mr. Sales's care.  Please contact me with any questions regarding the content of this report.      --------------------------------EXTENDED REPORT--------------------------------  Verbal consent for neuropsychological testing was received following the provision of information about the nature of the evaluation, and the opportunity to ask questions.     Mr. Sales previously underwent neuropsychological evaluation with Dr. Tanvi Akers on July 27, 2015.  Please see that report in the EMR for full background.  The following provides an update over the last 2 years.    Mr. Sales was accompanied by wife and was an adequate historian. Together they provided the following information.     HISTORY OF PRESENTING PROBLEM:    Relevant History  Mr. Sales initially presented to the [facility] in 2015 due to several years of concern with cognitive changes, anger outbursts, and his wife reporting that he had  "been \"addicted,\" to pornography.  Treatment focused on addressing mood issues and he was referred to psychotherapy (completed 2 sessions), and tried various medications with notable improvement in his mood (initially Prozac, transitioned to Cymbalta in September 2016).  He returned to clinic in May 2017 with concerns about increased cognitive changes. In addition, Parkinsonian type symptoms had become apparent (masked face, decreased eye blinking, rigidity, possible gait disturbance, but difficulty to  due to history of chronic pain) and trials of Sinemet and Nuplazid were attempted. He saw no benefit from the Sinemet and while Nuplazid was helpful, he did not like the side effects so ultimately stopped taking it.     Current Interview  At the present time, Mr. Sales reports that he feels that his thinking is becoming worse and worse over time.  In particular, he describes increasing difficulties with memory such that he forgets information rather quickly. He described for example that he and his wife watched a movie yesterday, and he cannot remember the name or what it was about.  He also noted that his wife will ask him to do things and if she does not write it down, he will be unable to remember. Mr. Sales noted that he is much more reliant on a calendar that he once was.  He is not sure whether he has any changes in his ability to pay attention, but definitely feels that his thinking has slowed and he notices increased word finding difficulties such that he often has difficulty finishing his thoughts.    Mr. Sales's wife generally agreed with the above description noting that her 's memory has significantly declined and she feels that this has been particularly noticeable in the last year.  She noted that he can say something or agree to do something, but forgets moments later.  In addition to significant declines in memory recently, she feels that mood swings are becoming more prominent.  As per the " "previous evaluation, he had been experiencing anger outbursts which have been increasing in frequency and intensity.  She notes that she finds it hard to predict what is going to upset him; he will suddenly become very agitated and angry about what she perceives as rather small things.  For example, she noted they recently sold a property in [place] and were driving back. She had planned out routes trying to keep things simple.  One morning she was explaining the days route and apparently he did not like the direction she took and so threw the map and screamed/ yelled about it.  She noted that he has always been \"hot headed,\" but she feels it is worse now; he is much more agitated than he used to be and it is \"hard to make him happy\" noting that he does not smile anywhere as much as he used to.  She also noted that it is very hard to predict what will upset him, describing how sometimes he is \"malou dovey\" one moment then agitated the next, then \"hernando dovey\" again. Mr. Sales himself agreed that he does tend to get angry more quickly than he used to, but feels that his wife is more sensitive and overreacts.    With regard to the activities of daily living, Mr. Sales reported that he is able to set up his pillbox and remember to take his medications without difficulty.  He noted that his wife has always manage the family finances and done the cooking.  He continues to drive with no recent tickets, accidents or incidents of becoming lost.  His wife generally agreed noting that he is an excellent  but did indicate that he does best in familiar locations and can get more easily turned around when he is in unfamiliar places.    MEDICAL HISTORY:  Mr. Sales's medical history is significant for   Past Medical History:   Diagnosis Date     Arthritis      Back pain      Chest pain      Coronary artery disease      Diabetes mellitus     type II     Hearing loss of both ears      Heart attack 2/14/2014     As described " above, Mr. Sales has recently been diagnosed with parkinsonism.  He notes that he is currently being followed by Dr. Eun Lion [facility] and believes that she has formally diagnosed him with Parkinson's disease.    Mr. Sales's current problem list includes   Patient Active Problem List   Diagnosis     Male Erectile Disorder     Lumbar Disc Degeneration     Sciatica     Intervertebral Disc Degeneration     Generalized Osteoarthritis Of Multiple Sites     Acute Whngce-lhacoj-ijjjic Myocardial Infarction - Initial Care ([place])     Coronary Artery Disease     Peripheral Vascular Disease     Chest pain     Right shoulder pain     CAD (coronary artery disease)     Pre-syncope     Parkinsonian features     Mr. Sales denied any history of stroke, seizure or head injury with loss of consciousness. He did report sustaining a head injury in August as a result of a fall. He noted that he had been in his garden picking tomatoes when he somehow lost his balance and fell forward breaking his nose and scratching his glasses.  He denied loss of consciousness but noted that he has had a headache virtually daily since that time.  He does not believe that his cognition has changed significantly since then and his wife agreed.    Mr. Sales reported that he would like to obtain more exercise but has not been walking as much recently.  He noted that when he was in [place], he was walking about 1/2 mile every day but since he has been back in [place] for about a week, he has not done anything.  He noted that his balance is off and that he relies heavily on a walker while around the house.  Today he came with only a cane.    Mr. Sales described ongoing sleep difficulties noting that he frequently lashes out in his sleep, moving his limbs rather forcefully.  His wife also noted that he can become rather rigid.  She has since stopped sleeping with him.  He indicated that his sleep does not feel very restful and he rarely wakes  up refreshed in the morning.    Diagnostic studies:  A recent head CT (5/18/2017) was notable for moderate presumed chronic small vessel ischemic changes.      Past Surgical History:   Procedure Laterality Date     APPENDECTOMY  1950     CARPAL TUNNEL RELEASE Right 10/29/2014     CORONARY STENT PLACEMENT  4/30/2014     decompression l3       EYE SURGERY       FOOT SURGERY       HERNIA REPAIR       REPLACEMENT TOTAL KNEE       spinal fusion L4-L5       Current medications include (per medical record):   Current Outpatient Prescriptions:      acetaminophen (TYLENOL) 500 MG tablet, Take 1,000 mg by mouth every 6 (six) hours as needed for pain. , Disp: , Rfl:      aspirin 81 MG tablet, Take 81 mg by mouth daily., Disp: , Rfl:      carbidopa-levodopa (SINEMET)  mg per tablet, Take 0.5 tablets by mouth 3 (three) times a day., Disp: 45 tablet, Rfl: 1     coQ10, liposomal ubiquinol, 8 mg/mL Liqd, Take 10 mL by mouth., Disp: , Rfl:      DULoxetine (CYMBALTA) 30 MG capsule, TAKE 1 CAPSULE DAILY, Disp: 90 capsule, Rfl: 3     fexofenadine (ALLEGRA) 180 MG tablet, Take 180 mg by mouth daily as needed. , Disp: , Rfl:      fluticasone (FLONASE) 50 mcg/actuation nasal spray, 2 sprays into each nostril daily., Disp: 16 g, Rfl: 4     GLUC/SOO-MSM#1/VIT C/CLAUDETTE/BOR (GLUCOSAMINE-CHOND-MSM COMPLEX ORAL), Take 1 each by mouth daily., Disp: , Rfl:      meclizine (ANTIVERT) 12.5 mg tablet, Take 1 tablet (12.5 mg total) by mouth 3 (three) times a day as needed for dizziness or nausea., Disp: 30 tablet, Rfl: 1     MV-MN/FA/LYCOPENE/LUT/HB#185 (DIABETIC SUPPORT FORMULA ORAL), Take 1 Package by mouth once daily. Takes a diabetic vitamin/supplement thomas- gets at [place], Disp: , Rfl:      naproxen (NAPROSYN) 500 MG tablet, Take 1 tablet (500 mg total) by mouth 3 (three) times a day with meals., Disp: 90 tablet, Rfl: 3     nitroglycerin (NITROSTAT) 0.4 MG SL tablet, Place 1 tablet (0.4 mg total) under the tongue every 5 (five) minutes as  "needed for chest pain., Disp: 25 tablet, Rfl: 3     nitroglycerin (NITROSTAT) 0.4 MG SL tablet, SEE ENCLOSED LITERATURE FOR DIRECTIONS ON HOW TO TAKE YOUR MEDICATION, Disp: 100 tablet, Rfl: 2     omeprazole (PRILOSEC) 40 MG capsule, Take 1 capsule (40 mg total) by mouth as needed. Before a meal, Disp: 90 capsule, Rfl: 0     pimavanserin (NUPLAZID) 17 mg Tab tablet, Take 2 tablets (34 mg total) by mouth daily., Disp: 60 tablet, Rfl: 8     pregabalin (LYRICA) 100 MG capsule, Take 1 capsule (100 mg total) by mouth 2 (two) times a day., Disp: 60 capsule, Rfl: 2     traZODone (DESYREL) 50 MG tablet, Take 50 mg by mouth at bedtime., Disp: , Rfl:      VIT C/VIT E/LUTEIN/MIN/OMEGA-3 (OCUVITE ORAL), Take 1 capsule by mouth daily., Disp: , Rfl: .    Of note, Mr. Sales indicated that he is not currently taking any medications associated with parkinsonism.     FAMILY HISTORY:   Family medical history is significant for:   Family History   Problem Relation Age of Onset     Cancer Mother      Cancer Father      Per Dr. Akers's report, family medical history is also notable for Brain cancer-mother, ASCVD and myocardial infarction-maternal uncles, leukemia-father, late life dementia with apathy-father    Per Dr. Akers's report, family psychiatric history is notable for mental illness with \"nervous breakdowns\"-maternal and paternal uncle/aunts     PSYCHIATRIC HISTORY:  Per Dr. Akers's 2015 report, Mr. Sales experienced episodes of anger several years ago and was apparently successfully treated with an antidepressant. This was apparently following the pornography issue; he also saw a psychologist at that time. Medication was subsequently discontinued.     Since then, he was recommended for psychotherapy and saw Dr. Darling Isbell two times in August and September 2015 but declined further sessions.  As mentioned above, he has also tried Prozac with good effect but transitioned to Cymbalta due to side effects from Prozac. He has " "been taking Cymbalta since the fall of 2016 and feels that it has been helpful for his mood.    When asked about his current mood, Mr. aSles described it as \"just tired.\"  He notes that he is frequently exhausted and does not have the energy or motivation to do things.  His wife elaborated stating that he can go days without getting out of pajamas.  She notes that he often seems very depressed and unmotivated and he generally agreed. However, Mr. Sales denied any current suicidal ideation, plan or intent or hallucinations or delusions.    Per Dr. Akers's report, there is no reported history of chemical dependency diagnosis, treatment, or hospitalization. He smoked 1 ppd until 1964, when he quit. He may have an occasional beer these days but denies any history of having had a problem with alcohol. On current interview, Mr. Sales noted that his level of alcohol consumption is the same (i.e., an occasional beer).     SOCIAL HISTORY:  Per Dr. Akers's report and Dr. Isbell's 8/13/15 note, Mr. Sales was born in [place], [place], but raised in [place]. He reports that he was not a very good student, doing freshman English twice, but was never formally held back or diagnosed with a learning disability. 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 He served in the  for over 30 years.  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     Mr. Sales has been  3 times, most recently for 35 years. He was  from his first marriage after 13 years and  from his second marriage after 17 years. He has 9 children (3 biological, 3 adopted, and 3 stepchildren). He has 19 grandchildren.     Today, Mr. Sales indicated that he enjoys listening to music, and playing bridge.  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 " " He cites difficulties with motor control, but more notably \"because of my head.\"  He notes that both he and his wife enjoy traveling and generally go on a trip yearly. He states that he does not seem to enjoy the trips as much as he used to. He feels that he is not quite as excited about it as he once was.  He also notes that his wife likes to keep very busy and sometimes it feels like it is a bit much for him.    PREVIOUS TESTING:  Mr. Sales previously underwent neuropsychological evaluation with Dr. Tanvi Akers on July 27, 2015.  Please see that report in the EMR for full results. In brief, she described a profile that \"contains a level of very subtle variation in cognitive performance that would be considered to be within normal limits for the patient's age and education.\" She highlighted some subtle weaknesses in problem solving, semantic fluency, as well as speeded word reading. The former two measures were not sufficient to warrant a cognitive diagnosis and the latter was thought potentially related to a possible pre-existing learning disorder.     MENTAL STATUS AND BEHAVIORAL OBSERVATIONS:   Mr. Sales arrived on time and accompanied by his wife  to today's appointment. He was appropriately dressed and groomed. He was alert and oriented to person, place and date. Gait was slow but steady and he ambulated with the aid of a cane. His mood was dysphoric and his affect was appropriately reactive. Rapport was easily established and eye contact was unremarkable. He was pleasant and cooperative. Rate, prosody, and content of speech were grossly normal. No significant word finding difficulties or paraphasic errors were evident. There was no evidence of a eri thought disorder; no hallucinations or delusions were apparent. Judgment and insight appeared fair.       Mr. Sales appeared adequately motivated and engaged easily in the testing component of the evaluation. He appeared to put forth good and consistent " effort. He attempted all tasks presented to him and worked at a steady pace. He did not appear overly frustrated by difficult or challenging tasks and responded appropriately to encouragement from the examiner. No significant barriers to testing were observed and the following is judged to be a valid representation of Mr. Sales's current neurocognitive strengths and weaknesses.    TESTS ADMINISTERED:   [place] Naming Test-15 (BNT), [place] Verbal Learning Test - II Short (CVLT-II), Category Fluency (CAT), Clock Drawing, Controlled Oral Word Association Test FAS (COWAT), Geriatric Depression Scale (GDS), Grooved Pegboard, Ion-Osterrieth Complex Figure Test (RCFT), Stroop Color and Word Test, Trail Making Test (TMT), WAIS-IV Working Memory Index (WMI), WAIS-IV Similarities, WAIS-IV Matrix Reasoning, WAIS-IV Information, WAIS-IV Block Design, WMS-IV Logical Memory, and [place] Card Sorting Test-1 deck (WCST).    MOANS norms were used for Stroop, TMT    An attempt was made to match the previous battery.     DESCRIPTIVE PERFORMANCE KEY:  Scores at the 9th percentile and above are generally considered within normal limits:  Superior scores:  91st percentile and above  High Average scores:       75th through 90th percentile  Average scores:                 25th through 74th percentile  Low Average scores:         10th through 24th percentile    Scores that fall at the 9th percentile are considered borderline    Scores that fall at the 8th percentile and below are considered a degree of impairment:  Mildly Impaired:  3rd through 8th percentile  Moderately Impaired:  1st through 2nd percentile  Severely Impaired:  <1st percentile    OPTIMAL PREMORBID INTELLECT:  Optimal premorbid intellectual abilities were estimated as falling in the average range based on Mr. Sales's educational and occupational histories.     SUMMARY OF TEST RESULTS:   Performance on a measure of basic attention and working memory was assessed in the  average range. This reflected average basic attention skills and average working memory skills.  This is consistent with performance in 2015.  On a mental arithmetic task, he performed in the average range, again consistent with 2015 performance.  Overall basic attention and working memory skills are stable over the last 2 years.    Cognitive efficiency was assessed in the high average range for a simple sequencing task (consistent with 2015 performance), the borderline range for a speeded word reading task (representing a slight improvement from previous mildly impaired performance 2 years ago), and the low average range for a speeded color naming task (consistent with previous testing). Overall, cognitive efficiency is stable over the last 2 years.    Performance on language measures was notable for high average verbal abstraction skills, representing a slight improvement from average performance 2 years ago. Confrontation naming was assessed in the superior range, representing a stable score over the last 2 years but normative improvement from previous high average performance. Phonemic fluency was assessed in the average range consistent with testing 2 years ago. And semantic fluency was assessed in the average range reflecting notable improvement from previous mildly impaired performance in 2015. Fund of general knowledge was assessed in the average range consistent with previous testing.    Visual spatial reasoning skills were assessed in the average range for a block construction task, representing a decline from previous high average performance on this task 2 years ago. Visual-spatial reasoning skills were assessed in the average range for a pattern completion task reflecting stable performance on this task.    Immediate memory for two short stories was assessed in the high average range. His delayed recall of the stories was assessed in the high average range. Good benefit was obtained from cues as  recognition memory was assessed in the high average range. In 2015, immediate and delayed recall for the stories was also assessed in the high average range but with average recognition.    On a more difficult list learning task, overall learning for a 9 item word list was assessed in the superior range (7,8,8,6). After a brief delay, he was able to retain 7 words for superior immediate recall performance. After a longer 20 minute delay, he was able to retain 7 words for superior delayed free recall performance. Some benefit was obtained from semantic cues as he could recall 9 words on the delayed cued recall trial. Recognition memory was assessed in the average range. He correctly identified 9 words, and made 1 false positive error.  In 2015, overall learning had been assessed in the average range (4, 6, 5, 8).  However, immediate and delayed free recall trials had also previously been assessed in the superior range (7 words and 8 words respectively) and recognition memory was the same (9 hits and 1 false positive).    Immediate nonverbal memory for a complex figures was assessed in the superior range. After a delay, he retained sufficient details for superior delayed recall performance. Recognition memory was average as he identified 20 details of the original figure and made 0 false positive errors.  In 2015, immediate and delayed recall for this complex figure was also assessed in the superior range but with high average recognition memory (21 details with no false positives).     Performance on measures of executive functioning was intact. Specifically, a complex sequencing and set shifting task was performed in the average range and without error (consistent with previous testing). On a measure of deductive reasoning and problem-solving, overall performance was assessed within normal limits for his age and education in terms of the number of categories learned (1). His performance was not characterized by an  error prone or perseverative response style though the number of perseverative responses was slightly below expectation (low average). Previously he also learned one category (which was low average at the time), but errors and perseverative responses were previously average for his age and education. His ability to inhibit a dominant response was assessed in the low average range. His performance on this task was without error. This represents a very slight decline from previous average performance. His copy of a complex figure was performed within normal limits for his age and notable for mildly poor planning. His performance was similar in 2015. His drawing of a clock was notable only for incorrect hand size but correct placement.     Fine motor dexterity was average bilaterally and with the typical dominant hand advantage.     Mild symptoms of depression were endorsed on a questionnaire.    EVALUATION SERVICES AND TIME:   Pertinent information was obtained by reviewing the electronic medical record as well as through an individual interview I conducted with the patient.  I selected the tests and supervised the technician.    Diagnosis:  No Cognitive Diagnosis    For diagnostic and coding purposes, Mr. 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 Feedback of results will be provided via a formal feedback appointment with me on December 12th.     97363: 1 unit of the neuropsychologists time was spent in neurobehavioral status exam  76912: 3 units of the neuropsychologists time was spent in medical record review, administration of COWAT, CAT, interpretation and integration of all tests, and report preparation  18289: 3 units of technician time was spent in the  administration and scoring of the remainder of the testing battery      Holly Reynoso, PhD, LP, ABPP  Clinical Neuropsychologist, LP#[id number]  Board Certified in Clinical Neuropsychology    [address] [address]  Phone:  [phone]

## 2021-06-14 NOTE — PROGRESS NOTES
NEUROPSYCHOLOGY FEEDBACK NOTE    The purpose of today s appointment was to provide feedback regarding Mr. Sales recent neuropsychological evaluation on November 29th. He arrived 30 minutes early and accompanied by his wife. We discussed the evaluation results including no evidence of cognitive decline or acquired cognitive dysfunction. I shared that his cognitive skills and especially memory, are, doing rather well for his age.  I emphasized that his day-to-day experience of difficulties likely relate to his significant mood disturbance.  We spent the majority of the time discussing possible interventions including psychotherapy and psychiatric interventions.  We also discussed some strategizing for helping him feeling more confident in his medical care, such as finding a new primary provider with a geriatric specialty. They indicated readiness to learn for the education provided during this session and also understanding by asking questions and making appropriate comments. They denied further questions at this time but were encouraged to contact this provider with any concerns regarding this evaluation. Recommendations from the report were provided to him as part of the After Visit Summary.  In addition, he completed an WOOD to obtain a copy of the evaluation so that he could share it with future providers.    Holly Reynoso, PhD, LP, ABPP  Clinical Neuropsychologist, LP# [id number]  Board Certified in Clinical [place]psych[place] [address]    For diagnostic and coding purposes, Mr. Sales has a history of complex medical history including ASCVD (MI and stenting ×2), type 2 diabetes mellitus, chronic back pain with implanted nerve stimulator, depression, and remote tobacco abuse with more recent diagnosis of parkinsonism. He was referred for evaluation of Mild Neurocognitive Disorder due to Parkinson's.      80225: 1 unit of the neuropsychologists  time providing feedback about neuropsychological test results and answering questions

## 2021-06-15 NOTE — PROGRESS NOTES
"Physical Therapy  Physical Therapy Daily Outpatient Documentation    MEDICARE (1/19/201/ - 4/15/2018)     Rx Units:  1-TE, 2-NR    Total OP Minutes: 40    Treatment #: 5/11      Pain: 8/10   Location: LBP  Intervention: See below    Subjective:  Pt reports an increase in LBP this date due to lifting some 2x4's and cutting them with his saw.  Pt overall feels like his dizziness is improving and reports that the dizziness is \"not bad today.  It has not bothered me at all today.\"    Therapeutic Exercise  Total Minutes: 10    -NuStep x 8min, Level 3, Seat 11, Arms 11  - VC to keep up effort, push through for last few minutes   Avg. Mets 2.9       -Sit to Stands while standing on yellow foam; 15 x 2 sets without UE A, from standard chair -Cues to dive big and up tall.  Pt able to complete without LOB.  Neuro Re-Ed/Balance  Total Minutes: 30      -Obstacle course with small and large river rocks x 6 reps with cues for both feet to large river rock and single LE to small river rock.  Once on large river rocks, pt instructed to demo horizontal head turns x 2 reps in each direction;  Pt initially mod A for balance with head turns but by 4-5th reps, pt was CGA to close SBA.    -Standing PNF UE D1/D2 with 2.2# med ball x 10 reps ea direction with PT placing targets for pt to reach to and encouraged cervical ROM with exercise.  Mild instability with occ CGA from PT to recover from balance.      Dynamic balance with gait challenges, no AD and SBA for safety throughout    -Lateral walking 2 x 60 feet Paul - VC for maintaining lateral steps, increasing step clearance and distance  -Speed changes 2 x 60 feet - VC to increase speed, walk slowly, pt struggled to increase speed beyond normal pace but improved with repetition  -Backward walking 2 x 60 feet Paul - VC for larger steps, landing on toes and maintaining upright posture, several mild LoB requiring Rg to recover    Gait Training  Total Minutes:     Other: Therapeutic " Activity  Total Minutes:     Assessment/Plan for week of (1/29/18 - 2/2/18):  Despite the pt's elevated pain, the pt was able to participate fully with PT and no c/o additional pain with tasks described above.  See previous note for weekly AP.      Additional Notes:

## 2021-06-15 NOTE — PROGRESS NOTES
".  Assessment:     1. Parkinson like symptoms  2. Mild cognitive impairment  3. Dizziness  4. Orthostatic B/P    Plan:     1.  Referral to ENT  2.  PT to evaluate and treat  3.  Stop Sinemet start pramipexole  4.  Labs, orthostatic blood pressures and EKG due to shortness of breath  5.  Referral to psychologist  6. Return to clinic in 4 weeks    The patient returns to the memory loss clinic, he was last seen by me on 9/21/17, where the patient restarted the Nuplazid and Sinemet.  Patient reports that he stopped the Nuplazid again because he is now sleeping good, I did try to realign the patient that the last time he stopped he did get hallucinations back but the patient now wants to do a trial off the medication.  He also reports that since starting the Sinemet he has felt nauseous.  I will have the patient stop the Sinemet and started the patient on pramipexole, he does note cramping in his leg at night which may be restless leg syndrome. Patient also reports that he has constant drainage out of his nose, a dry mouth and eyes, and the right side of his head feels \"jammed up all the time\".  I have referred him to ENT to make sure that there is no inner ear problem.  The patient also did see his primary and was diagnosed with vertigo so I will have him return to physical therapy so there is no falls.  The patient is having problems catching his breath at the appointment today so I will do labs and EKG to rule out any cardiac issues.  The patient did have a neuropsychological assessment completed and showed that he had no cognitive problems.  Previous neuropsych testing believe that most the patient's cognitive impairment was due to emotional symptoms.  He is reporting mild depression symptoms.  I do have a problem with the patient taking medication as directed, I have asked him to speak with our psychologist to see if we can help with his anxiety and depression.  I feel the patient becomes overly anxious about " "medical conditions and will many times self dose his medications.  Patient reports that he would rather speak with a psychologist then change or add any medication at this time.    Subjective:       Familia Sales is a 86 y.o. male who initially presented to the memory loss clinic on 5/7/15  for a full diagnostic evaluation. PCP completed a Mini-Mental exam in office (28/30), but thought he should be evaluated in this office due to changes in behavior. Wife explained that she was concerned about the patient's behavior and cognition, about 5 years prior the patient began to show mood swings and extreme anger.  The patient experienced similar behavior in the past, but he was given, they believe, an antidepressant which resulted in great improvement in behavior.  The patient also saw a psychologist at that time and was told that he had \"deep psychological problems\", although wife reported that he was \"perfect \", he had his \"issues\". Medication was stopped after several years and the patient continued to do well up until about 5 prior. The patient was found to be \"window peeping\" a neighbor.  A couple of years after the anger started, the patient was found to be \"addicted\" to pornography and dating web sites.  He was constantly interacting with people online until his wife discovered what he had been doing. He was embarrassed and remorseful, wife moved out and was gone for 3 months.  During that time, wife reported that the patient did not function well.  When she came back she found rotting food in the refrigerator, broken things in the home, and that he spent most of his time lying around in his pajamas.  Once she moved back in, she assumed all care of the home like she used to and he seemed to be happy. She believed that he was not obsessing about pornography or dating web sites anymore, but she wouldn't be surprised if he looked occasionally \"because he is a carol and did that from time to time when he was " "younger\".  The patient continued with mood swings and extreme anger that seems to come out of the blue, he had \"another personality in his head and argues with me in his head and thought the arguments were real\".  He had never been physically aggressive with his wife, but was yelling at her frequently. The patient and his wife were on public transportation when the patient apparently became extremely angry when he saw a 16-year-old boy sitting in a seat meant for handicapped people.  He began yelling at the boy and telling him how inappropriate it was, especially when a handicapped adult came on the bus and needed to sit somewhere else.  There were many seats available for this person, even other handicapped spots next to the boy.  Wife was embarrassed. The patient still was an empathetic person, but his wife believed that he did not recognize when she was upset like he used to. There were no stereotypical behaviors or new fixations or habits with food. Increased anger started 5 years ago, patient's wife also states the patient's cognition changed as well.  She says that it has worsened to a point where he has difficulty learning new things and is often times forgetful, specifically with recent information.  He has difficulty understanding when events from the past happen, so his standard response is \"40 or 50 years ago\".  She also says that he seems to have difficulty with directions and has gotten lost while driving.  She also says that he drives erratically at times, weaving through traffic, running red lights and stopping the car at the very last moments before hitting something.  The patient does not have any specific chores or responsibilities around the home, but she has noted that he seems to be apathetic and shows a lack of interest in doing anything.  At times he doesn't even change out of his pajamas.     Patient reports that he had \"flares of anger\" and trouble with memory for about a year.  He had " "difficulty with anger \"in the 1980's\" and he was given a medication that really helped, but he no longer takes it.  He felt incredibly sensitive to comments made by his wife and he either argues with her or dismisses her altogether.  When describing the incident with the handicapped seating, the patient said that he was yelling at \"kids\" and that the bus was totally full so the handicapped person didn't have anywhere to sit.  He was surprised by how angry he became, but he said that this reaction might be normal for someone else.  Regarding his memory, he reports that he had difficulty with names and word finding and it takes him longer to recall information.  At times his wife has to repeat information for him because he either forgets or he can't understand what she is talking about.  He thinks that his anger and memory difficulties are getting worse, but he denies any difficulty with functionality. He tells me that his driving was good, but he admitted that his wife would likely disagree with him. Neuropsychological testing was completed on 7/27/15 and the DIAGNOSTIC IMPRESSIONS was Cognitive complaints with normal neuropsychological evaluation, Mood disorder, unspecified, Tobacco use disorder, in full sustained remission, 8/3/15 History, physical examination and testing was suspicious for a possible emerging neurodegenerative illness, yet neuropsychological testing was normal.  Patient had some difficulty with impulse control and complaints of short-term memory loss. Prozac 20 mg was started. 9/10/15 The patient and wife noted a great change in the patient's demeanor since starting on Prozac.  He was much more comfortable. 9/16/16   Patient reported that he was having leg cramps so he discontinued Prozac in April and has not experienced leg cramps since.  Unfortunately since that time he had become increasingly sensitive and was feeling very down emotionally.  He was impatient with his wife and they were arguing " more. His sleep was interrupted due to frequent waking then had difficulty falling back to sleep Cymbalta 60 mg was started. 9/30/16 he believes that 30 mg was a better dose.  He feels a little bit more activated at 60 mg, but 30 mg he felt more comfortable.  5/10/17  Patient presented to memory loss clinic to ask if there was any advancement in his cognitive impairment. When :MA took the patient's B/P it was 212/88 sitting 147/77 standing  I was questioning the patient and he suddenly was pale and diaphoretic. I  Retook B/P 163/85. The patient reported that he had a hard time breathing. He also had tingling down both arms. Patient has a history of CAD, 2 previous MI with stents placed, anxiety/depression, and diabetes. He reported that he felt dizzy and confused since he got up this morning. No chest pain. Patient reported increased SOB, internal code 9 called. House doctor assessed and agreed that patient should be sent for further testing. Patient sent to [facility].  5/18/17 The patient believes he may have Lewy Body, he also complains about a tremor, on the inside and in his hands. He also believes that his anxiety is getting worse thinking about this and his anxiety is increasing his cognitive deficits. I will have the patient take another neuro psych and we will do another CT scan. I believe that the patient's anxiety is not under good control. We discussed possible treatment plans, The patient would first like to rule out Parkinson's Disease, the patient does have a masked face, decreased eye blinking, rigidity, it is difficult to  his gait due to his chronic pain. I will do the Sinemet challenge on the patient. He will take a half of a tab of Sinemet IR 25/100 TID. I did discuss all potential benefits and risks to the patient. The patient verbalized understanding. I have asked the patient to keep a log and write down things that get better or things that get worse as we try different treatment  "plans.  6/5/17 The patient reports that the Sinemet did not help his symptoms. He reports that he does not sleep. He has very vivid dreams, he will also see things in his peripheral and when he turns the object is not there. We will start the patient on Nuplazid, the patient has Parkinsonism. I do believe that the patient does suffer from anxiety but he would like to see if we can help him with sleep first, before treating his anxiety. Patient and wife are reading a lot on the Internet on Lewy Body, the patient believes he does have the disease. I discussed Lewy Body and he should monitor and document his symptoms. I do believe that the patient's anxiety does play into the patient's symptoms. We will try different treatment plans.    6/21/17  the patient took the medication one night and felt dizzy all day the following day. I did reduce the dose to 1 tab every night, to see if this will help with the patient with delusions, shadowing, and REM sleep disorder. The patient reports \"feeling shaky\" all over even on the inside. A long discussion was held with the patient about differential diagnosis, we are trying different treatment plans to see what helps with the patient's symptoms. Patient is keeping a log of symptoms for better analysis. Given the patient had no effect from Sinemet, there is a decreased chance that the patient has Parkinson's Disease , but the patient does have Parkinsonisms.   7/19/17 He reports sleeping much better. His tremor is reduced. His chronic pain is also reduced he now rates his pan a 5/10. He reports a decrease in anxiety, he has not had a panic attack since starting medication. The patient c/o memory problems. He was talking to his wife and was to meet her at 1100, he started to do things and loss track of time and was late. I did discuss with the patient that he most likely is not used to doing things in his day so hours could feel like hours. The patient was looking for an apartment " "for his granddaughter, he saw a \"glider\" out by the trash, he used to fix furniture but really has not done this in about 6 years. He took the glider home and is now working on restoring it. He is able to now sleep throughout the night. The patient and his wife now decided to travel so they will be going to [facility]. He reports now feeling good, and is happy that he is getting back into his \"old routine\". The patient is also working with PT, and he reports that this is also helping his gait, balance and pain.   8/18/17  The patient had severe dry eyes so he stopped the Nuplazid thinking that was causing it. He later stated that he has an infection in his eye so he has been putting antibiotic drops into his eyes, which is what is probable causing the dry eyes. He reports that he is doing pretty good. He is still doing PT with is helping him with his gait and chronic pain.   9/21/17  The patient just got back from vacation in [facility], he reports that vacation went well. He stated that his pain has significantly reduced since doing PT. He started to have hallucinations and vivid dreams again after returning from [facility] so he restarted the Nuplazid. Once the antibiotic for his eye was stopped the dryness stopped. He has not had any problems since restarting the Nuplazid and his dreaming and hallucinations have stopped. He feels his mind is a bit slower in the afternoon but he just restarted the Nuplazid so I am hoping his day time tiredness will improve. He reports that his anxiety is still better. His tremor has increased and this is very bothersome to him, we will do another retrial of Sinemet to see if his will improve his Parkinson like features. Patient complains of dizziness and weakness, I will do an EKG to make sure there is not changes in his heart conditions.      Patient Active Problem List    Diagnosis Date Noted     Parkinsonian features 05/18/2017     Chest pain 07/21/2014     Right shoulder pain " 07/21/2014     CAD (coronary artery disease) 07/21/2014     Pre-syncope 07/21/2014     Acute [id number] Myocardial Infarction - Initial Care ([place])      Coronary Artery Disease      Peripheral Vascular Disease      Male Erectile Disorder      Lumbar Disc Degeneration      Sciatica      Intervertebral Disc Degeneration      Generalized Osteoarthritis Of Multiple Sites      Past Medical History:   Diagnosis Date     Arthritis      Back pain      Chest pain      Coronary artery disease      Diabetes mellitus     type II     Hearing loss of both ears      Heart attack 2/14/2014     Past Surgical History:   Procedure Laterality Date     APPENDECTOMY  1950     CARPAL TUNNEL RELEASE Right 10/29/2014     CORONARY STENT PLACEMENT  4/30/2014     decompression l3       EYE SURGERY       FOOT SURGERY       HERNIA REPAIR       REPLACEMENT TOTAL KNEE       spinal fusion L4-L5       Family History   Problem Relation Age of Onset     Cancer Mother      Cancer Father      Current Outpatient Prescriptions   Medication Sig Dispense Refill     acetaminophen (TYLENOL) 500 MG tablet Take 1,000 mg by mouth every 6 (six) hours as needed for pain.        aspirin 81 MG tablet Take 81 mg by mouth daily.       coQ10, liposomal ubiquinol, 8 mg/mL Liqd Take 10 mL by mouth.       DULoxetine (CYMBALTA) 30 MG capsule TAKE 1 CAPSULE DAILY 90 capsule 3     fexofenadine (ALLEGRA) 180 MG tablet Take 180 mg by mouth daily as needed.        fluticasone (FLONASE) 50 mcg/actuation nasal spray 2 sprays into each nostril daily. 16 g 4     GLUC/SOO-MSM#1/VIT C/CLAUDETTE/BOR (GLUCOSAMINE-CHOND-MSM COMPLEX ORAL) Take 1 each by mouth daily.       meclizine (ANTIVERT) 12.5 mg tablet Take 1 tablet (12.5 mg total) by mouth 3 (three) times a day as needed for dizziness or nausea. 30 tablet 1     MV-MN/FA/LYCOPENE/LUT/HB#185 (DIABETIC SUPPORT FORMULA ORAL) Take 1 Package by mouth once daily. Takes a diabetic vitamin/supplement thomas- gets at [employer]        naproxen (NAPROSYN) 500 MG tablet Take 1 tablet (500 mg total) by mouth 3 (three) times a day with meals. 90 tablet 3     nitroglycerin (NITROSTAT) 0.4 MG SL tablet Place 1 tablet (0.4 mg total) under the tongue every 5 (five) minutes as needed for chest pain. 25 tablet 3     nitroglycerin (NITROSTAT) 0.4 MG SL tablet SEE ENCLOSED LITERATURE FOR DIRECTIONS ON HOW TO TAKE YOUR MEDICATION 100 tablet 2     omeprazole (PRILOSEC) 40 MG capsule Take 1 capsule (40 mg total) by mouth as needed. Before a meal 90 capsule 0     pimavanserin (NUPLAZID) 17 mg Tab tablet Take 2 tablets (34 mg total) by mouth daily. 60 tablet 8     pramipexole (MIRAPEX) 0.25 MG tablet Take 1 tablet (0.25 mg total) by mouth 3 (three) times a day. 270 tablet 1     pregabalin (LYRICA) 100 MG capsule Take 1 capsule (100 mg total) by mouth 2 (two) times a day. 60 capsule 2     traZODone (DESYREL) 50 MG tablet Take 50 mg by mouth at bedtime.       VIT C/VIT E/LUTEIN/MIN/OMEGA-3 (OCUVITE ORAL) Take 1 capsule by mouth daily.       No current facility-administered medications for this encounter.        Allergies   Allergen Reactions     Codeine Itching     And sweating     Ibuprofen Itching     Morphine      Excessive sweating     Tagamet [Cimetidine]      Vicodin [Hydrocodone-Acetaminophen] Other (See Comments)     Profuse sweating     Social History     Social History     Marital status:      Spouse name: N/A     Number of children: N/A     Years of education: N/A     Occupational History     Not on file.     Social History Main Topics     Smoking status: Former Smoker     Quit date: 5/4/1964     Smokeless tobacco: Never Used     Alcohol use No     Drug use: No     Sexual activity: Not on file     Other Topics Concern     Not on file     Social History Narrative       The following portions of the patient's history were reviewed and updated as appropriate: allergies, current medications, past family history, past medical history, past social  history, past surgical history and problem list    Objective:     There were no vitals filed for this visit.     Total time spent with the patient today was 30 with greater than 50% of the time spent in counseling and care coordination. I asked him to call with any questions or concerns and will see him again in clinic in about 4 weeks. We discussed the risks and benefits of the medication including risk of worsening depression with medication adjustments and even the possibility of emergence of suicidality    Mental Status Examination  Patient is casually dressed, appropriately groomed and seated for evaluation.  No evidence of acute psychological distress.  His behavior is socially appropriate and he is cooperative with questioning.  Affect was brighter today and mood was pleasant.  He is fully alert and oriented.  Thoughts are expressed in a linear fashion and comprehension appears normal.  Speech is spontaneous with a slight reduction in volume, but normal rate.  Content without evidence of auditory or visual hallucinations.  No delusional ideation.  Gen. fund of knowledge, insight and memory were grossly normal.   ..

## 2021-06-15 NOTE — PROGRESS NOTES
"Assessment:     1. Cancer of lip  Ambulatory referral to Dermatology       Plan:     1. Cancer of lip    - Ambulatory referral to Dermatology      Subjective:   Patient has a non healing sore on the outer third of his left lower vermilion border which is now bleeding. Lots of sun exposure. Referral and biopsy needed.  No other symptoms,no headache cranial nerve d/Plasticysfunction dysphagia,hemoptysis,neck or chest pain. ROS otherwise all normal;dementia workup and Parkinson's workup discussed with patient who should stay under [place]\"s care.  All questions asked were answered; med review conducted ; Pleasure to see him again.  Will follow with Derm/plastic    Review of Systems: A complete 14 point review of systems was obtained and is negative or as stated in the history of present illness.    Past Medical History:   Diagnosis Date     Arthritis      Back pain      Chest pain      Coronary artery disease      Diabetes mellitus     type II     Hearing loss of both ears      Heart attack 2/14/2014     Family History   Problem Relation Age of Onset     Cancer Mother      Cancer Father      Past Surgical History:   Procedure Laterality Date     APPENDECTOMY  1950     CARPAL TUNNEL RELEASE Right 10/29/2014     CORONARY STENT PLACEMENT  4/30/2014     decompression l3       EYE SURGERY       FOOT SURGERY       HERNIA REPAIR       REPLACEMENT TOTAL KNEE       spinal fusion L4-L5       Social History   Substance Use Topics     Smoking status: Former Smoker     Quit date: 5/4/1964     Smokeless tobacco: Never Used     Alcohol use No         Objective:   /70  Pulse 72  Wt 201 lb 4.8 oz (91.3 kg)  BMI 28.88 kg/m2    General Appearance:  Normal  Head:  Normal  Ears: Normal  Eyes:  Normal  Nose:  Normal  Throat:  On mouth hyperkeratotic erythematous outer third left lower vermilion border suspicious for cancer  Neck:  Normal  Back:  Normal  Chest/Breast:Normal  Lungs:  Normal  Heart:  Normal  Abdomen:  " [place]  Musculoskeletal:  Normal  Lymphatic:  Normal  Skin/Hair/Nails:  Normal  Neurologic:  Normal  Extremities:  Normal  Genitourinary: Normal  Pulses:  Normal           This note has been dictated using voice recognition software. Any grammatical or context distortions are unintentional and inherent to the the software.

## 2021-06-15 NOTE — PROGRESS NOTES
"Physical Therapy  PHYSICAL THERAPY INITIAL VESTIBULAR ASSESSMENT                          Date of Onset: 2017  Subjective: Patient reports that he fell and broke his nose in 2017. He was in the garden and bending over to pick some tomatoes off the bush. He then stepped onto the driveway and fell forward. Patient is unsure if he tripped or just lost his balance.  He went to the doctor the following day where X-rays were taken and he was sent to the ENT who confirmed that his nose was broken. Patient has been experiencing dizziness and head pressure since the fall.   Dizziness: Described has woozy feeling but denies nausea. The environment is not clear, but denies spinning sensation.  He describes unsteadiness when he is moving and sitting down.   Duration of dizziness: present most of the day. Aggravated by: he is unable to describe. Denies symptoms with bed mobility or getting up. Alleviated by: completes exercises or stays active to help with his dizziness.     Headache pressure and pressure in R ear.  He has been having difficulty hearing his wife since he recieved hearing aides which was about 3 years ago.  Retired in  as a    Falls - denies, however has had a number of close calls.  Occurs when ascending stairs, reaching above his head and climbing ladders.  He has difficulty recalling exact symptoms he experiences but notes that he needs to hold onto something as he will begin shaking.  AD: SEC in the community and 4WW in the home.   Pain ratin-6/10  Location: low back pain.  PT goal: improve balance and \"fix my head\"     PMH: Parkinsonism, chronic LBP with sciatica   Past Medical History:   Diagnosis Date     Arthritis       Back pain       Chest pain       Coronary artery disease       Diabetes mellitus       type II     Hearing loss of both ears       Heart attack 2014            Past Surgical History:   Procedure Laterality Date     APPENDECTOMY        CARPAL " TUNNEL RELEASE Right 10/29/2014     CORONARY STENT PLACEMENT   4/30/2014     decompression l3         EYE SURGERY         FOOT SURGERY         HERNIA REPAIR         REPLACEMENT TOTAL KNEE         spinal fusion L4-L5          OBJECTIVE:    ROM: Cervical   Mild restriction with R Rot and B SB, otherwise WFL        Postural Assessment: rounded shoulder posture with forward head as well as increased trunk flexion at hips due to chronic low back pain    Vestibular/Balance  Gait:WBOS, decreased step length, increased lateral sway, Use of SEC.  Gait speed: 0.71 m/s  6 meters in 8.41 sec    Vertebral Basilar Artery: NT   Ocular AROM: Normal  Smooth Pursuit: saccadic pursuit, however normal for age  Saccades: Normal    Convergence: 0 cm  VOR Cancellation: Normal  Slow VOR:Normal  Right Head Impulse Test: Negative  Left Head Impulse Test: Negative  Dynamic Visual Acuity: NT    Ocular Assessment with Infrared goggles:   Spontaneous nystagmus: negative   Gaze evoked nystagmus: negative   Head Shake nystagmus: negative   Pressure nystagmus: NT    Patient educated on positional testing technique and purpose of recreating symptoms - patient consented. Vestibular semi-circular canal assessment with infrared goggle, fixation removed:   Left [place]: NT  Right [place] Hallpike: NT  **Plan to assess next session    Sensory Organization Tests:  MCTSIB: NSEO Normal       PSEO 30 secs with mod sway       NSEC unable first two attempts, on third severe sway and hip strategy                  PSEC Abnormal- 3 secs before LOB    Dynamic Gait Index:  Total Score: 8/24   Gait Level surface: 1 - Moderate impairment: slow speed, AD, unsteadiness   Change in Gait Speed: 1- Moderate impairment: only minor adjustments, AD   Gait with horizontal head turns: 1 - Moderate impairment: slows down and staggers slightly   Gait with vertical head turns:  1 - Moderate impairment: slows down and staggers but is able to recover   Gait and Pivot Turn: 1 -  Moderate impairment: turns slowly and requires several steps to recover balance   Step Over Obstacle: 1 - Moderate impairment: strikes shoe box   Walking around obstacles: 2 - Moderate impairment: slows down   Steps: 0 - Severe impairment: two feet per step, uses HR and SEC, moderate sway and needs to stop to catch balance, slow speed.           Sensation Screen   Pt reports tingling in B feet at rest. Light touch revealed diminished sensation throughout L LE and absent on plantar aspect of foot.       Initial Treatment: Pt educated on evaluation findings and POC.  All questions addressed. Plan to issue ABC next session and assess side lying canal testing to rule out BPPV then assess orthostatic BPs.     Therapist Recommendations:  Impairments identified; See POC for goals and scheduled for subsequent visits      Rx Units: Evaluation  Total Minutes: 60

## 2021-06-15 NOTE — PROGRESS NOTES
Physical Therapy  Medicare Certification      Medical Diagnosis: Dizziness, Falls         Treatment Dx: unstable balance, gait instability/abnormality, decreased functional mobility/safety, back pain  Referring MD: KEVIN Avitia  Onset date 1/5/2018     Start of Care 1/8/2018       Assessment:  Patient is a 87 yo male who presents with complaints of dizziness after sustaining a forward fall in August 2017.  He has been experiencing ongoing dizziness and head pressure which has affected his stability while walking and increased his reliance on his 4WW at home. Additionally he has had a number of close calls in terms of falls and these typically occur when he is ascending stairs, step ladders or looking up.  Physical therapy evaluation revealed normal oculomotor assessment given his age and functional cervical ROM. Semi-circular canals were not tested on evaluation as reports of symptoms did not indicate immediate testing, however given pt appears to be a poor historian, canal testing will be ruled out at a later date. Patient had difficulty with the mCTSIB especially on foam with eyes closed, however he does demonstrates peripheral neuropathy which is most likely playing a role in his balance.  Additionally, patient scored high fall risk on the Dynamic Gait Index (8/24) despite use of SEC and demonstrate unsafe balance on the stairs.  Overall, his mobility appears affected by his chronic low back pain which is present with upright mobility.  Skilled 1:1 PT is indicated in order to address balance deficits and optimize function.     Prognostic Indicators:  Rehabilitation potential: Fair and based on prolonged symptoms, chronic low back pain. Pt also has difficulty describing his symptoms and appears to be a poor historian.     Impairment:  Gait, Motor Function, Balance, Dizziness, Activity Tolerance, Sensory Function and Pain    Functional Goals:  to be met by 11 visits  Pt will...  1. Improve Dynamic Gait  Index Score to 14/24 with AAD in order to reduce fall risk  2. Improve comfortable gait speed to 0.87 m/s with AAD in order to improve community mobility  3. Improve ABC score by 13% in order improve balance confidence with ADLS and IADLs  4. Ascend/descent 10 steps with HR and distance supervision.  5. Be independent with a HEP to self manage symptoms.     Plan of Care:  Communication with referral source, patient, caregiver., Paitent/Family Instruction: Treatment plan/rationale, home exercise program, expected functional outcome., Therapeutic Exercise, Neuromuscular reeducation, Mobility/Transfer Training, Gait Training, Manual Therapy, Therapeutic Activity and Canal Respositioning    Frequency / Duration: 2 x/week for up to 11 visits including evaluation    Kleber Gibson, PT, DPT, NCS   1/8/2018   8:52 PM    MEDICARE PATIENTS:  [facility] # [id number]  Provider #   Certification Dates: from 1/8/2018  to 4/6/2018      Physician Recommendation:  1. I certify the need for these services furnished within this plan and while under my care. I agree with the therapist's recommendation for plan of care.    2. If there is any recommendation for modification of therapy plan, please indicate below.      Physician's Signature (Printed):  _____________________________

## 2021-06-16 NOTE — PROGRESS NOTES
"Physical Therapy    Pt arrived to therapy clinic in 10/10 pain and this author came upon pt having a phone conversation with an individual that the pt later identified as the \"[facility].\" This author heard the woman on the phone advising the pt to head to an Emergency Department. When pt got off the phone, he explained that he has been in excruciating back pain since yesterday afternoon (8-10/10) and no interventions have helped significantly. Took pt's vitals in seated position, HR= 77 bpm, SpO2= 99% BP = 168/83 mmHg.   Pt was advised by therapy staff to go to an emergency department as initially instructed by the [facility] to be [facility] for [facility], and pt was asked if he would like to call an ambulance rather than drive himself, to which pt denied. Pt was able to ambulate out of clinic unassisted.   Due to pt presentation, therapy is not appropriate this date. Unable to update G-Codes, plan to next visit.   "

## 2021-06-16 NOTE — PROGRESS NOTES
Physical Therapy  Physical Therapy Daily Outpatient Documentation    MEDICARE (1/19/201/ - 4/15/2018)     Rx Units:  1-TE, 3-NR  Total OP Minutes: 55    Treatment #: 9/11  - Reassessment performed 02/19/2018    Pain: 6/10 LBP  Location: LBP  Intervention: See below    Subjective:   [facility] and [facility] for his lower back. He is taking a different medication for pain now, which he feels is helpful. Presents today without SPC. He cut back on Cymbalta and started a new medication for depression.     Therapeutic Exercise  Total Minutes: 15    -NuStep x 10 min, Workload 6, Seat 11, Arms 11  - speed was self-selected again today- pt displays mild SOB, RPD: 6/10.     Avg. Mets 3.7   SPM 86    Therapeutic BIG walking:  BIG walking:  - 232 feet without AD with cues for upright posture and continued arm swing, pt displays slightly less L UE extension with swing  - 232 feet with head turns up/center, and right/left. One LOB with looking up; pt deviates to L but corrects balance.   - 232 feet with forward/backwards walking. Pt loses balance x 2 but self corrects during retro steps. Cues to keep heels slightly further apart during backwards steps improved balance- pt tends to catch his heels as he walks backwards due to narrow AMBER.      Neuro Re-Ed/Balance  Total Minutes: 40    Standing balance on blue foam, CGA for safety:   - NBOS with head turns up/right/left x 60 seconds x 2 reps. No LOB.  - Normal stance EC x 30 seconds x 3 reps with short break EO between sets. Moderate sway, no LOB.    - SLS on airex pad with foot on step x 30 seconds x 2 reps B. VC for abdominal and gluteal engagement to improve stability. No LOB.   - Normal stance with D2 flexion diagonals using yellow medicine ball x 10 reps B. Denies increased lower back pain. Cues again for abd engagement.    On ground with CGA for safety.  - Tandem stance x 30 second x 2 reps B. Pt using treatment mat intermittently for support. More  difficulty balancing with L LE posterior, LOB x 2. No LOB with R LE posterior.     Occasional short seated rest provided between ex's above due to LBP. No c/o SOB throughout balance exercises.     Dynamic balance stepping:  Colored foam steps placed in a [place] with yellow foam in center. Pt asked to step both feet to a colored foam and name a food that color. CGA for safety. LOB requiring min A to correct with lateral step and posterior stepping. Cued pt to increase step height to clear edge of foam.       Gait Training  Total Minutes:     Other: Therapeutic Activity  Total Minutes:     Assessment/Plan for week of 2/12-2/16:  Patient returns to PT today reporting improved lower back pain. Pt reports he has had medication changes which have improved his pain levels. Overall he tolerated treatment and exercise better today than at previous visits. Pt was willing to participate in all exercises today despite chronic back pain. He also had no c/o SOB but did require short rest breaks due to pain. Plan to continue challenging dynamic gait and balance exercise as tolerated at next visit.       Additional Notes:

## 2021-06-16 NOTE — PROGRESS NOTES
Physical Therapy  Physical Therapy Daily Outpatient Documentation    MEDICARE (1/19/201/ - 4/15/2018)     Rx Units:  1TE, 3NR   Total OP Minutes: 55    Treatment #: 7/11  - Reassessment performed today    Referring Provider:  KEVIN Avitia    Pain: 5/10 LBP  Location: LBP  Intervention: See below    Subjective:  Pt reports having a good day today, presents to therapy without cane. Pt reports significant back pain yesterday that kept him from doing much.     Therapeutic Exercise  Total Minutes: 10    -NuStep x 10 min, Level 5, Seat 11, Arms 11  - VC to keep up pace, pt denied any increase in LBP with NuStep  Avg. Mets 4.1         Neuro Re-Ed/Balance  Total Minutes: 45    Standing Balance on Blue Foam, with CGA for safety  -Trunk rotation with feet together reaching with PNF patterns D1/D2 with blue weighted ball, 2 x 10 reps. VC for increasing trunk rotation as pain allowed, no obvious LOB, SBA    - Staggered stance on firm surface, with blue weighted ball PNF patterns D1/D2, after 3 reps, pt's pain was significant in L hip/back, so discontinued exercise.  - Toe tapping on 4 cones set up in semi-[place] in front of patient. 3 rotation CW/CC bilaterally. CGA but with 1 LOB that pt was able to self-correct, pt demos good control and effort.     Dynamic Gait Index:  Total Score: 16/24   Gait Level surface: 2   Change in Gait Speed: 2   Gait with horizontal head turns: 2   Gait with vertical head turns:  3   Gait and Pivot Turn: 2   Step Over Obstacle: 2   Walking around obstacles: 2   Steps: 1 - Pt able to use one rail and alternate feet ascending, but requires two rails and steps onto stair with both feet.     Increased time to complete exercises due to back pain and fatigue requiring frequent rest breaks.     - Circuit of 10 sit to stands without UE support from chair onto foam.   -2 min of ball toss on foam with CGA  Repeated circuit x 2. Pt required increased time between reps for fatigue. Mild LOB that  required therapist Jerardo at times. Cues for full extension into standing and return to chair.       Gait Training  Total Minutes:     Other: Therapeutic Activity  Total Minutes:     Assessment/Plan for week of (02/19/2018 - 02/23/2018)  Pt reported having a better day today in regards to LBP, but appeared to still be limited in session by bouts of pain and fatigue. Pt met his goal for the DGI, achieving a score of 16/24 on the DGI this date as compared to 8/24 at evaluation, indicating improved dynamic balance. Pt demonstrated mild instability with additional balance challenges this date, appeared to be due in part to LBP. Pt will continue to benefit from skilled therapy to address balance and endurance deficits. Plan to continue to challenge pt's dynamic balance with foam surfaces challenges, tandem stance, and cone tapping activities.  The G-codes have been updated this date and will need to be re-entered based on the pt's gait speed next Rx session.  This note has been sent to the pt's referring provider based on  requirements for communication purposes.      Additional Notes:

## 2021-06-19 NOTE — LETTER
Letter by Henrik Breaux MD at      Author: Henrik Breaux MD Service: -- Author Type: --    Filed:  Encounter Date: 4/17/2019 Status: (Other)         Familia LYDIA Sales  [address] [facility] [address]      April 17, 2019      Dear Familia,    This letter is to remind you that you will be due for your follow up appointment with Dr. Waylon Breaux  . To help ensure you are in the best health possible, a regular follow-up with your cardiologist is essential.     Please call our Patient Scheduling Line at [phone] to schedule your appointment at your earliest convenience.  If you have recently scheduled an appointment, please disregard this letter.    We look forward to seeing you again. As always, we are available at the number  above for any questions or concerns you may have.      Sincerely,     The Physicians and Staff of [facility]

## 2021-06-25 NOTE — PROGRESS NOTES
Progress Notes by Jani Ruiz MD at 10/25/2017  4:30 PM     Author: Jani Ruiz MD Service: -- Author Type: Physician    Filed: 10/25/2017  5:18 PM Encounter Date: 10/25/2017 Status: Signed    : Jani Ruiz MD (Physician)       Chief Complaint   Patient presents with   ? Dizziness     Dry mouth/throat, lightheaded   History of Present Illness: Nursing notes reviewed.   Patient reported some on and off dizziness especially with certain body position.  He noted some dry mouth especially when he wakes up in the morning.  Reports on and off cough.  Some slight nasal congestion.  He says he has been working with physical therapy and occupational therapy for unsteady gait.  Denies any fever denies any chills.  Is tolerating that well.  Review of systems: See history of present illness, otherwise negative.     Current Outpatient Prescriptions   Medication Sig Dispense Refill   ? acetaminophen (TYLENOL) 500 MG tablet Take 1,000 mg by mouth every 6 (six) hours as needed for pain.      ? aspirin 81 MG tablet Take 81 mg by mouth daily.     ? carbidopa-levodopa (SINEMET)  mg per tablet Take 0.5 tablets by mouth 3 (three) times a day. 45 tablet 1   ? coQ10, liposomal ubiquinol, 8 mg/mL Liqd Take 10 mL by mouth.     ? DULoxetine (CYMBALTA) 30 MG capsule TAKE 1 CAPSULE DAILY 90 capsule 3   ? fexofenadine (ALLEGRA) 180 MG tablet Take 180 mg by mouth daily as needed.      ? fluticasone (FLONASE) 50 mcg/actuation nasal spray 2 sprays into each nostril daily. 16 g 4   ? GLUC/SOO-MSM#1/VIT C/CLAUDETTE/BOR (GLUCOSAMINE-CHOND-MSM COMPLEX ORAL) Take 1 each by mouth daily.     ? MV-MN/FA/LYCOPENE/LUT/HB#185 (DIABETIC SUPPORT FORMULA ORAL) Take 1 Package by mouth once daily. Takes a diabetic vitamin/supplement thomas- gets at [facility]     ? naproxen (NAPROSYN) 500 MG tablet Take 1 tablet (500 mg total) by mouth 3 (three) times a day with meals. 90 tablet 3   ? nitroglycerin (NITROSTAT) 0.4 MG SL tablet Place 1 tablet  (0.4 mg total) under the tongue every 5 (five) minutes as needed for chest pain. 25 tablet 3   ? nitroglycerin (NITROSTAT) 0.4 MG SL tablet SEE ENCLOSED LITERATURE FOR DIRECTIONS ON HOW TO TAKE YOUR MEDICATION 100 tablet 2   ? omeprazole (PRILOSEC) 40 MG capsule Take 1 capsule (40 mg total) by mouth as needed. Before a meal 90 capsule 0   ? pimavanserin (NUPLAZID) 17 mg Tab tablet Take 2 tablets (34 mg total) by mouth daily. 60 tablet 8   ? pregabalin (LYRICA) 100 MG capsule Take 1 capsule (100 mg total) by mouth 2 (two) times a day. 60 capsule 2   ? traZODone (DESYREL) 50 MG tablet Take 50 mg by mouth at bedtime.     ? VIT C/VIT E/LUTEIN/MIN/OMEGA-3 (OCUVITE ORAL) Take 1 capsule by mouth daily.     ? meclizine (ANTIVERT) 12.5 mg tablet Take 1 tablet (12.5 mg total) by mouth 3 (three) times a day as needed for dizziness or nausea. 30 tablet 1     No current facility-administered medications for this visit.        Past Medical History:   Diagnosis Date   ? Arthritis    ? Back pain    ? Chest pain    ? Coronary artery disease    ? Diabetes mellitus     type II   ? Hearing loss of both ears    ? Heart attack 2/14/2014      Past Surgical History:   Procedure Laterality Date   ? APPENDECTOMY  1950   ? CARPAL TUNNEL RELEASE Right 10/29/2014   ? CORONARY STENT PLACEMENT  4/30/2014   ? decompression l3     ? EYE SURGERY     ? FOOT SURGERY     ? HERNIA REPAIR     ? REPLACEMENT TOTAL KNEE     ? spinal fusion L4-L5        Social History     Social History   ? Marital status:      Spouse name: N/A   ? Number of children: N/A   ? Years of education: N/A     Social History Main Topics   ? Smoking status: Former Smoker     Quit date: 5/4/1964   ? Smokeless tobacco: Never Used   ? Alcohol use No   ? Drug use: No   ? Sexual activity: Not Asked     Other Topics Concern   ? None     Social History Narrative       History   Smoking Status   ? Former Smoker   ? Quit date: 5/4/1964   Smokeless Tobacco   ? Never Used      Exam:    Blood pressure 120/80, pulse 84, temperature 97.7  F (36.5  C), temperature source Oral, resp. rate 18, weight 206 lb (93.4 kg), SpO2 94 %.    EXAM:   General: Well-groomed well-developed adult in no distress.  Head appears normocephalic atraumatic eyes pupils are equal round and reactive to light conjunctivae is moist and pink sclera anicteric.  Mouth lips appear normal oropharynx is clear but slightly dry.  Some slight nasal congestion is noted.  No obvious tenderness on palpation on his frontal sinus and maxillary sinus.  Lungs he has a normal respiratory effort on auscultation breath sounds are clear.  Heart he has a good S1 and S2 no obvious murmur noted.  Skin on inspection no obvious rashes noted is warm to touch skin turgor appear normal.    No results found for this or any previous visit (from the past 24 hour(s)).     Assessment/Plan   1. Vertigo  meclizine (ANTIVERT) 12.5 mg tablet   2. Upper respiratory tract infection, unspecified type     May use Tylenol as needed for any fever or discomfort.  Meclizine for vertigo.  Return to clinic in case of any acute changes or does not feel better.  Or go to the nearest emergency room or hospital.  Patient was agreeable to this discharge planning.  Patient Instructions     Dizziness (Vertigo) and Balance Problems: Diagnostic Tests    An otolaryngologist (also called an ENT) is a doctor who specializes in disorders of the ear, nose, and throat. Your ENT can help find clues to the cause of your dizziness. He or she will examine you and go over your health history. Your ENT may also order certain tests to help diagnose your problem.  Hearing testing  In most cases, you will be referred for hearing testing. This is because the nerve that sends balance signals also sends hearing signals. A problem that affects balance can also affect hearing.  Other tests  Your doctor may recommend more than one kind of test. The following tests are painless, but may cause dizziness  in some cases.    MRI creates images of the ear or head. A magnetic field and contrast medium are used to make the image.    Electronystagmography (ENG) records eye movement. Small electrodes are put on the skin around your eyes. Then your ear is filled with warm or cold water.    Rotation tests show the relationship between the inner ear and your eyes. You may be asked to wear special goggles or sit in a computerized chair.    Posturography tests your standing balance under different conditions. You will stand on a platform that measures shifts in your body weight.     Electrocochleography (ECoG) measures the fluid pressure in the inner ear. An abnormal ECoG may mean you have Meniere's disease or other conditions.    Vestibular evoked myogenic potentials (VEMPs) may be used if your healthcare provider suspects a rare condition like superior semicircular canal dehiscence. Electrodes are placed on your neck, and you hear clicks in your ear.  Date Last Reviewed: 11/1/2016 2000-2016 [employer]. [address] [place] [address], [place] [address]. All rights reserved. This information is not intended as a substitute for professional medical care. Always follow your healthcare professional's instructions.           Jani Ruiz MD

## 2021-06-26 NOTE — PROGRESS NOTES
Progress Notes by Bibi Dempsey PT Student at 2018 11:18 AM     Author: Bibi Dempsey PT Student Service: -- Author Type: PT Student    Filed: 2018 12:09 PM Date of Service: 2018 11:18 AM Status: Attested    : Bibi Dempsey PT Student (PT Student) Cosigner: Missy Watkins PT at 2018  4:13 PM    Attestation signed by Missy Watkins PT at 2018  4:13 PM    SPT in direct supervision of PT with PT directing treatment and plan of care.  Missy Watkins PT, DPT, MTC, NCS                  Physical Therapy  Physical Therapy Daily Outpatient Documentation    MEDICARE (/ - 4/15/2018)     Rx Units:  1-TE, 3-NR  Total OP Minutes: 55    Treatment #:   - Reassessment performed 2018    Pain: 6/10 LBP  Location: LBP  Intervention: See below    Subjective:  [facility], who provided him with additional pain medication and advised him to be on bed rest for 3 days. Pt is frustrated about situation regarding back pain and feels he has limited options left for treatment, [facility]. Pt feels he may take a small break from therapy in order to complete diagnostic testing with back before finishing remaining two sessions. Upon further questioning, pt explains that back pain as been an issue since 1950 and multiple surgeries have not brought success.     Therapeutic Exercise  Total Minutes: 15    -NuStep x 10 min, Workload 6, Seat 11, Arms 11  - speed was self-selected due to pt's presentation appearing to be quite fatigued as evidenced by SOB and increased effort..   Avg. Mets 3.7   SPM 86    Vitals taken after NuStep:  HR: 95 bpm   SpO2: 97%  BP: 178/87 mmHg  RPE: 8/10     Therapeutic BIG walkin ft with no AD, cues for increasing posture as back pain allows, larger steps. Pt demonstrates uneven gait pattern with hip drop on R, increased stance phase on  R resulting in increase swing phase on L, decreased arm swing, bent posture, and stiffness through trunk. No LOB, SBA    Neuro Re-Ed/Balance  Total Minutes: 40    -Static standing on blue foam ~ 1 min with narrow AMBER, hands on hips. No major LOB, CGA  - Static standing on blue foam ~ 1 min with narrow AMBER, hands on hips eyes closed. Therapist CGA to min A at times due to mild LOB.   Pt increased SOB with activities, described that back pain has increased since beginning session.     Standing Balance on Blue Foam, with CGA for safety  -Trunk rotation with feet together reaching with PNF patterns D1/D2 with unweighted ball, 2 x 10 reps. VC for increasing trunk rotation as pain allowed, no obvious LOB,     - Staggered stance on firm surface, eyes open ~ 1 min x 2 bilaterally CGA. Pt had significant difficulty with this activity this date, experiencing mod LOB that required therapist Mod A to correct. Pt appears to fatigue quickly with this exercise and needs extended seated rest break.      - Cone tapping from firm surface, CGA. 5 Cones in semi [place] fashion CC/CL x 1 set bilaterally. Had to discontinue after 1 bilateral set due to pt's back pain.     **With all exercises, pt required frequent and extended rest breaks due to back pain.  Educated pt on the importance of continued movement despite back pain, evidence surrounding movement in aiding back pain and the danger of becoming sedentary due to back pain.       Gait Training  Total Minutes:     Other: Therapeutic Activity  Total Minutes:     Assessment/Plan for week of (02/26/2018 - 03/02/2018)  Pt continues to have significant back pain that impacts progression of therapy. Pt required increased rest breaks throughout session and looked visibly distressed despite insisting he wanted to continue with therapy. Pt remains appropriate for skilled therapy to address balance deficits but may benefit from a break in order to address back care. Plan next visit to  follow-up with back pain sxs and continue to encourage movement for healing as well as continue dynamic gait challenges.       Additional Notes:

## 2021-06-26 NOTE — PROGRESS NOTES
Progress Notes by Earnest Connors PT Student at 1/19/2018 10:54 AM     Author: Earnest Connors PT Student Service: -- Author Type: PT Student    Filed: 1/19/2018  2:34 PM Date of Service: 1/19/2018 10:54 AM Status: Attested    : Earnest Connors PT Student (PT Student)    Related Notes: Original Note by Earnest Connors PT Student (PT Student) filed at 1/19/2018  1:02 PM    Cosigner: Kleber Gibson PT at 1/19/2018  4:23 PM    Attestation signed by Kleber Gibson PT at 1/19/2018  4:23 PM    SPT under direct supervision of PT with PT directing treatment and plan of care.    Kleber Gibson PT, DPT, NCS                Physical Therapy  Physical Therapy Daily Outpatient Documentation      Rx Units:  1- TE, 2- NR, 1 -TA     Total OP Minutes: 55    Treatment #: 2/11  MEDICARE (1/19/201/ - 4/15/2018)    Pain: 5-6/10  Location: Hips and LE's  Intervention: Taking Naproxen TID    Subjective: Pt reports nothing new since examination, a few moments of off balance, when he didn't feel stable, didn't notice any sort of pattern.  Got up out of bed early in the morning to use bathroom, felt fine, walked to bathroom and felt like he was going down, had to reach for his grab bars.  Pt reported that at times his hip and leg pain has caused him to lose his balance, knees buckle.      Therapeutic Exercise  Total Minutes: 20  -NuStep x 8min, Level 3-4, Seat 12, Arms 12    VC - keep SPM around 100  Avg. Mets 3.1      Pt reported NuStep feels great, like he's getting a lot of steps but doesn't hurt at all    -Verbally reviewed patient's exercise routine and instructed him to bring in all materials at next session to help focus his HEP  -Squats x 10 with VC to keep knees straight ahead, slow repetitions    Neuro Re-Ed/Balance  Total Minutes: 20  -Positional Tests: Modified Posterior Semi-Circular Canal Sidelying Tests due to patient's history of LBP and difficulty assuming Hoffmeister-Hallpike  position, positional tests indicated by patient's report of feeling dizzy looking up and turning head to left, see initial evaluation for details  L Posterior Canal: Negative for nystagmus and patient denied symptoms  R Posterior Canal: Negative for nystagmus and patient denied symptoms    Ambulation with/without Challenges, all CGA for safety, no AD:  -Forward at normal pace x 60 feet, VC for upright posture, patient reported mild pain in hips and legs, no increase in dizziness  -Horizontal head turns 2 x 60 feet, VC for maintaining straight path, pt demonstrated decreased speed and mod LoB with horizontal head turns, particularly to L, patient agreed this was more challenging  -Vertical head turns 2 x 60 feet, VC for slowing, finding center of gaze, pt again demonstrated decreased speed and mod LoB with vertical head turns, particularly up, patient agreed this was more challenging  -Pivot turns 2 x 40 feet, VC for quick turn and stop, pt unsteady and turns with narrow ABMER  Patient reported increased hip and leg pain with ambulation and needed several minutes of seated rest to recover for next activity    Gait Training  Total Minutes:       Other: Therapeutic Activity  Total Minutes: 15  Instructed pt in the ABC scale and assessed confidence in following areas to help shape POC  ABC Scale:   I'm confident I'm not going to lose my balance when:    Walking around the house: 5/10, 4WW    Climbing stairs: 2.5/10   Picking up a slipper: 7.5/10    Reaching for something at eye level: 10/10    Reaching above my head on my tiptoes: 0/10    Standing on a chair and reaching above: 0/10    Sweeping the floor: 9/10    Walking outside to car in the driveway: 5/10    Getting in/out of a car: 9/10    Walking across the parking lot to the mall: 2.5/10, SEC   Walking up and down a ramp 2.5/10   Walking in a crowded mall with people rapidly walking past: 8/10    I'm bumped into by people at the mall: 5/10    Stepping off an  escalator and holding onto the rail: 7.5/10    Stepping off an escalator holding packages: 2.5/10    Walking on icy surfaces: 1/10   TOTAL: 69.5/160 = 43%     Orthostatic Hypotension Screening - Pt reported feelings of dizziness and lightheadedness when standing from bed as described above  Supine - /79 and HR 70, pt reported feeling a little stuffy, pressure in his head, no dizziness  Seated - /88 and HR 77, pt reported feeling a little dizzy, but not much  Standing - /85 and HR 80, pt reported feeling dizzy, mild but noticeable, not nearly as dizzy as when he almost fell bathroom as described above.  Patient was moderately unsteady on his feet, ankle and mild hip strategy  Standing after 2 min - /88 and HR 84, pt reported no change in symptoms, mild but noticeable feeling of unsteadiness.  Patient remained moderately unsteady, occasional mild LoB but self corrected    Assessment/Plan for week of (1/14/18 - 1/19/18):  Patient reported he had a near fall early in the morning after getting up from bed, felt very weak and lightheaded, had to grab onto bars to hold himself up.  Administered ABC and patient scored 43%, lacked confidence in reaching or walking on uneven surfaces.  Screened patient for orthostatic hypotension and BPPV following subjective reports today and at evaluation, both negative.  Pt reported mild increase in dizziness with gait challenges, head turns, but primarily limited by hip and leg pain today.  Asked patient to bring in all printed exercises he's received to narrow them down into an appropriate HEP for his current status.  Plan to continue addressing dynamic balance and functional strength at next session.    Additional Notes:

## 2021-06-26 NOTE — PROGRESS NOTES
Progress Notes by Frieda Cuenca PT Student at 3/23/2018  1:11 PM     Author: Frieda Cuenca PT Student Service: -- Author Type: PT Student    Filed: 3/23/2018  4:03 PM Date of Service: 3/23/2018  1:11 PM Status: Attested    : Frieda Cuenca PT Student (PT Student) Cosigner: Missy Watkins PT at 4/2/2018  4:40 PM    Attestation signed by Missy Watkins PT at 4/2/2018  4:40 PM    SPT in direct supervision of PT with PT directing treatment and plan of care.  Missy Watkins PT, DPT, MTC, NCS                  Physical Therapy  Physical Therapy Daily Outpatient Documentation    MEDICARE (1/19/201/ - 4/15/2018)     Rx Units:  2 NR 2 TE  Total OP Minutes: 55    Treatment #: 10/11  -     Pain: 5/10 LBP  Location: LBP  Intervention: See below    Subjective:   Patient reports feeling stiff in his low back recently. He reports no other increased symptoms. Pt believes he is able to do more now than he used to.     Therapeutic Exercise  Total Minutes: 25    -NuStep x 10 min, Workload 5, Seat 11, Arms 11   RPE 5/10 at 5 minutes, RPE 7/10 at 7 minutes, RPE 8/10 at 10 minutes    Avg. Mets 3.7       10m Gait speed: 10.50 seconds-->  .95 m/s, 15 steps, no AD, Age matched gender norm: 1.21 m +/- .18    ABC Scale:   1. Walk around the house: 50%  2. Walk or down stairs: 20%  3. Bend over and  a slipper from in front of a closet: 10%  4. Reach for a small can off a shelf at eye level: 50%  5. Stand on tip toes and reach for something above your head: 20%  6. Stand on a chair and reach for something: 10%  7. Sweep the floor: 60%  8. Walk outside the house to a car parked in the driveway: 70%  9.  Getting in or out of a car: 70%  10. Walk across a parking lot to the mall: 20%  11. Walk up or down a ramp: 20%  12. Walk in a crowded mall where people rapidly walk past you: 20%  13. Are bumped into by people as you walk through the mall: 10%  14. Step onto or off an escalator while holding onto the  railin%  15. Step onto or off an escalator while holding onto parcels such that you cannot hold onto the railin%  16. Walk outside on icy sidewalks: 10%    Score: 30.63%        Neuro Re-Ed/Balance  Total Minutes: 30    DGI:  1.  Gait level surface: 2 No AD  2.  Change in gait speed: 2  3. Gait with horizontal head turns: 2, Slowed gait  4.Gait with vertical head turns: 3   5.Gait and pivot turn: 2, 5 steps to pivot  6. Step over obstacle: 3  7.Step around obstacle: 3  8.Steps: 1 - alternating 1 HHA ascending, step to BUE assist descending  Score:       Gait Training  Total Minutes:         Other: Therapeutic Activity  Total Minutes:     Assessment/Plan for week of 3/19/18-3/23/18  See discharge summary for details.         Additional Notes:

## 2021-06-26 NOTE — PROGRESS NOTES
Progress Notes by Earnest Connors PT Student at 2/5/2018  1:10 PM     Author: Earnest Connors PT Student Service: -- Author Type: PT Student    Filed: 2/5/2018  4:35 PM Date of Service: 2/5/2018  1:10 PM Status: Attested    : Earnest Connors PT Student (PT Student) Cosigner: Kelly Jesus PT at 2/6/2018  8:07 AM    Attestation signed by Kelly Jesus PT at 2/6/2018  8:07 AM    SPT under direct supervision of PT with PT directing treatment and plan of care.    Kelly Jesus PT                  Physical Therapy  Physical Therapy Daily Outpatient Documentation    MEDICARE (1/19/201/ - 4/15/2018)     Rx Units:  1-TE, 3-NR    Total OP Minutes: 55    Treatment #: 6/11      Pain: 6-7/10 LBP, 6/10 HA  Location: LBP, HA  Intervention: See below    Subjective:  Pt reports LBP has been pretty bad lately, just feels like he overdid it sawing wood and has been paying for it ever since.  Also has a HA, has been taking Naproxen to help with both LBP and HA.  Went for a walk yesterday, about a 1/4 mile.    Therapeutic Exercise  Total Minutes: 15    -NuStep x 8 min, Level 4, Seat 11, Arms 11  - VC to keep up pace, pt denied any increase in LBP with NuStep  Avg. Mets 3.8   SPM 95    -Stairs x 10, pt self selected ascending step over step, descending step to, 2 rails for both, pt slow and unsteady, occasional mild buckling of R knee, reported increased R knee pain with eccentric descent, CGA for mild unsteadiness, no LoB, required prolonged rest break at top of stairs to catch his breath and let LBP decrease    Neuro Re-Ed/Balance  Total Minutes: 40    Increased time for seated rest breaks due to fatigue and high LBP today, denied any increase in pain during activities but unable to tolerate standing for prolonged periods    Ambulation with and without challenges, without AD, CGA for safety throughout  -BIG walking 400 feet, VC for maintaining as upright a posture as possible without large increase in back  pain, maintaining BIG steps and shoulder width AMBER    -Lateral walking 2 x 60 feet Paul - VC for keeping toes pointing forward, maintaining forward gaze and increasing foot clearance  -Backward walking x 100 feet - VC for larger steps, maintaining feet shoulder width apart, Pt demonstrated several LoB due to crossing midline and catching foot on opp side, Rg to recover, improved with frequent cues as above    Standing Balance on Blue Foam, with CGA for safety  -Trunk rotation with feet together reaching and passing ball behind to therapist x 15 Paul, VC for increasing trunk rotation as pain allowed, Pt denied this increased LBP, occ LoB requiring Rg to recover  -Feet together with head rotation x 10, therapist calling out random directions  -Feet together with eyes closed for short 5-10second bouts, increasing time as able, frequent LoB requiring UE A on countertop and Rg to recover  -1/2 tandem, Paul with head rotation x 10, therapist calling out random directions, mod sway throughout with ankle and hip strategy at times  -1/2 tandem with eyes closed for short 5-10second bouts, mod sway with frequent hip strategy, Rg to maintain balance throughout    Gait Training  Total Minutes:     Other: Therapeutic Activity  Total Minutes:     Assessment/Plan for week of (2/5/18 - 2/9/18):  Pt reported strong LBP and HA today, required prolonged seated rest periods between activities to allow his LBP to reduce.  Pt demonstrated mild instability with dynamic balance during gait, appeared to be due at least in part to pain but also self selecting a very NBOS.  Pt also unstable with standing balance on foam, needed occasional assist to assume position and maintain with any head movement.  Pt remains appropriate for skilled 1:1 PT to address functional mobility and balance, plan to progress dynamic balance during gait if LBP allows, otherwise postural stability in seated position.      Additional Notes:

## 2021-06-26 NOTE — PROGRESS NOTES
"Progress Notes by Earnest Connors PT Student at 1/29/2018  2:05 PM     Author: Earnest Connors PT Student Service: -- Author Type: PT Student    Filed: 1/29/2018  4:55 PM Date of Service: 1/29/2018  2:05 PM Status: Attested    : Earnest Connors PT Student (PT Student) Cosigner: Kleber Gibson PT at 1/29/2018  5:00 PM    Attestation signed by Kleber Gibson PT at 1/29/2018  5:00 PM    SPT under direct supervision of PT with PT directing treatment and plan of care.    Kleber Gibson, PT, DPT, NCS                Physical Therapy  Physical Therapy Daily Outpatient Documentation      Rx Units:  2-TE, 2-NR    Total OP Minutes: 60    Treatment #: 4/11  MEDICARE (1/19/201/ - 4/15/2018)    Pain: Denies pain, minor increase in hips and low back throughout but reports this is his baseline, decreases with seated rest breaks   Location:   Intervention:     Subjective:  Pt reports his BP went back to his baseline by the time he got home after the last session, roughly 130/90, so decided not to contact his primary.  Pt reports his dizziness has been a little better, has been trying to \"train his head\" walking laps through the house.      Therapeutic Exercise  Total Minutes: 30  -NuStep x 8min, Level 5, Seat 11, Arms 11  - VC to keep up effort, push through for last few minutes   Avg. Mets 3.7   SPM 85    Patient displayed mild SOB with activity today, requiring 2-3 minute seated rest breaks between exercises due to general fatigue, pt denied any other symptoms  -Verbally reviewed pt's HEP and narrowed down exercises to most challenging without pain, appropriate for current status including LTRs, mini-squats, clams and pelvic tilts  -Sit to Stands 15 x 2 sets without UE A, from low chair - VC for scooting to edge, pulling feet under [place], tactile cues for anterior weight shift for first few stands but was able to complete remainder independently    Neuro Re-Ed/Balance  Total Minutes: " 30    Patient displayed mild SOB with activity today, requiring 2-3 minute seated rest breaks between balance activities to recover  Dynamic balance with gait challenges, no AD and CGA for safety throughout  -Forward walking x 232 feet - VC for upright posture, BIG steps and increased arm swing  -Forward walking 2 x 60 feet with horizontal head turns - VC for maintaining straight path, pt demonstrated mild LoB and slowing gait with head turns, particularly to the R  -Lateral walking 2 x 60 feet [place] - VC for maintaining lateral steps, increasing step clearance and distance  -Speed changes 2 x 60 feet - VC to increase speed, walk slowly, pt struggled to increase speed beyond normal pace but improved with repetition  -Backward walking 2 x 60 feet Paul - VC for larger steps, landing on toes and maintaining upright posture, several mild LoB requiring Rg to recover    Standing Balance at counter, CGA for safety  -Mini Squats on foam 20 x 2, VC for slowing progression and using fingetrip support on counter when needed  -Reaching across counter and placing cones with opposite arm x 8 Paul, - VC for regaining balance before reaching for next object  -Reaching across counter and placing cones with opposite arm x 8 Paul, NBOS -VC for weight shifting reaching outside AMBER  -Reaching up to shoulder level and above head in cabinets, placing cones on shelves x 8 [place], -VC to alternate UE's and maintain balance through LE's rather than heavy reliance on counter    Gait Training  Total Minutes:     Other: Therapeutic Activity  Total Minutes:     Assessment/Plan for week of (1/29/18 - 2/2/18):  Pt reported that he felt fine after last session, when he took his own BP at home was at his baseline and decided not to contact his PCP.  Today, denied any symptoms other than mild SOB, which resolved with seated breaks.  Pt was challenged by dynamic ambulation and standing balance tasks today but improved from previous sessions.  Pt also less  limited by pain today than previous sessions which allowed for more functional strengthening.  Pt remains appropriate for skilled 1:1 PT, plan to continue addressing functional strength and dynamic balance over a variety of surfaces.      Additional Notes:

## 2021-06-26 NOTE — PROGRESS NOTES
"Progress Notes by David Busch MD at 9/28/2018  3:00 PM     Author: David Busch MD Service: -- Author Type: Physician    Filed: 9/29/2018  7:55 AM Encounter Date: 9/28/2018 Status: Signed    : David Busch MD (Physician)       Subjective:   Familia Sales is a(n) 87 y.o. White or  male who presents to [facility] with the following complaint(s):  sore on left ear that won't heal. (States has had for \"quite a while\" no fevers)    History of Present Illness:  Lesion on the left ear has been present for several months. Area is crusted and bleeds periodically. Lesion is tender. Applied antibiotic ointment without improvement. No history of skin cancer. Has had precancerous lesions frozen by his dermatologist.     The following portions of the patient's history were reviewed and updated as appropriate: allergies, current medications, past family history, past medical history, past social history, past surgical history and problem list.    Review of Systems:   Review of Systems   All other systems reviewed and are negative.    Objective:     Vitals:    09/28/18 1514   BP: 126/62   Patient Site: Right Arm   Patient Position: Sitting   Cuff Size: Adult Regular   Pulse: 67   Resp: 20   Temp: 97.5  F (36.4  C)   TempSrc: Oral   SpO2: 96%   Weight: 201 lb 14.4 oz (91.6 kg)     Physical Exam   Constitutional: He is oriented to person, place, and time. He appears well-developed and well-nourished.  Non-toxic appearance. No distress.   HENT:   Ears:    Cardiovascular: Normal rate, regular rhythm, S1 normal and S2 normal.  Exam reveals no gallop and no friction rub.    No murmur heard.  Pulmonary/Chest: Effort normal and breath sounds normal. He has no wheezes. He has no rhonchi. He has no rales.   Neurological: He is alert and oriented to person, place, and time.   Skin: Skin is warm and dry. Lesion noted. No rash noted. No pallor.   Nursing note and vitals " reviewed.    Laboratory:  N/A    Radiology:  N/A    Electrocardiogram:  N/A    Assessment/Plan   1. Lesion of both external ears  - Ambulatory referral to Dermatology    - Dermatology notes from 1/17/2018 and 3/21/2018 reviewed in [facility]. Patient saw Dr. Theodore Lord at [facility] and had cryotherapy of actinic keratoses on the lower left ear, mid right ear, lateral neck bilaterally, and lower lip on 1/17/2018. Current lesion on the left ear is concerning for an actinic keratosis or possible squamous cell carcinoma. Current lesion on the right ear is likely a seborrheic keratosis. Have referred patient back to Dermatology for further evaluation and treatment.   - Counseled patient regarding assessment and plan for evaluation and treatment. Questions were answered. See AVS for the specific written instructions and educational handout(s) regarding skin cancer monitoring that were provided at the conclusion of the visit.   - Discussed signs / symptoms that warrant urgent / emergent medical attention.   - Follow up as needed.     David Busch MD

## 2021-06-26 NOTE — PROGRESS NOTES
Progress Notes by Henrik Breaux MD at 5/22/2018  1:10 PM     Author: Henrik Breaux MD Service: -- Author Type: Physician    Filed: 5/22/2018  1:17 PM Encounter Date: 5/22/2018 Status: Signed    : Henrik Breaux MD (Physician)                  [email or web address]  [phone]         Thank you Dr. To for asking the [facility] Heart Care team to participate in the care of your patient, Familia Sales.     Impression and Plan     1. Coronary disease. Familia has known coronary artery disease. Specifically, he had a history of having had a drug-eluting stent placement to the LAD in February 2014. He had this performed initially in [place]. He was noted to have at that time a significant right coronary artery stenosis and was being considered for stage intervention, but developed progressive chest pain symptoms. He ultimately underwent drug-eluting stent placement (3.0 x 16 mm) to the right coronary artery and was dismissed home 1 May 2014 post PCI.  ?  This is stable. Patient reports no chest pain or other concerning symptoms.  ?  2. Hypertension. Blood pressure was reasonable office today at 116/64 mmHg.  ?  3. Dyslipidemia. ?Lipid profile 7 November 2016 revealed  mg/dL and HDL 49 mg/dL. Patient has been intolerant of statin therapy in the past. We have discussed possible trial of ezetimibe or other newer agents though the patient is reticent to pursue this. He continues to feel this way.  ?  Plan on follow-up in one year.         History of Present Illness    Once again I would like to thank you again for asking me to participate in the care of your patient, Familia Sales.  As you know, but to reiterate for my own records, Familia Sales is a 87 y.o. male with history of having had a drug-eluting stent placement to the LAD in February 2014. He had this performed initially in [place]. He was noted to have at that time a significant right coronary artery stenosis and was being  considered for stage intervention, but developed progressive chest pain symptoms. He ultimately underwent drug-eluting stent placement (3.0 x 16 mm) to the right coronary artery and was dismissed home 1 May 2014 post PCI.  ?  On interview today, Familia states that he has been doing well from a cardiovascular standpoint. He specifically denies chest pain symptoms. He reports no shortness of breath, palpitations, or subjective decline in exercise tolerance other than that related to some mild orthopedic issues.  He tries to walk at least half a mile every evening and commonly goes for additional walks intermittently through the day as well and has had no problems with this activity.    Further review of systems is otherwise negative/noncontributory (medical record and 13 point review of systems reviewed as well and pertinent positives noted).         Cardiac Diagnostics      Echocardiogram performed 29 April 2014:  1. Normal left ventricular size and systolic performance with ejection fraction of 60%.  2. No wall motion abnormalities.  3. Mild concentric increased LV wall thickness.  4. No significant valvular heart disease.  5. Moderate left atrial enlargement.    Regadenoson nuclear perfusion study 15 May 2015:  1. Subjectively and objectively negative regadenoson infusion.  2. Regadenoson nuclear imaging does not suggest any ischemia or prior scar.  3. Preserved wall motion as mentioned above without any obvious wall motion abnormalities.    Coronary angiogram 30 April 2014:  1. Right dominant coronary arterial system.  2. Previous stent in proximal LAD exhibited no restenosis.  3. First diagonal coronary artery exhibited moderate-severe disease in the proximal segment, however, vessel arises from within stent segment of proximal LAD.  4. PCI directed to right coronary artery stenosis (3.0×16 mm Promise Premier drug-eluting stent)  ?  12-lead ECG (personally reviewed) 21 July 2014 revealed sinus rhythm with evidence  "of prior inferior infarct. ECG is unchanged from one May 2014.         Physical Examination       /64 (Patient Site: Right Arm, Patient Position: Sitting, Cuff Size: Adult Regular)  Pulse 92  Resp 16  Ht 5' 10\" (1.778 m)  Wt 198 lb (89.8 kg)  BMI 28.41 kg/m2        Wt Readings from Last 3 Encounters:   05/22/18 198 lb (89.8 kg)   01/12/18 201 lb 4.8 oz (91.3 kg)   10/25/17 206 lb (93.4 kg)     The patient is alert and oriented times three. Sclerae are anicteric. Mucosal membranes are moist. Jugular venous pressure is normal. No significant adenopathy/thyromegally appreciated. Lungs are clear with good expansion. On cardiovascular exam, the patient has a regular S1 and S2. Abdomen is soft and non-tender. Extremities reveal no clubbing, cyanosis, or edema.         Family History/Social History/Risk Factors   Patient does not smoke.  Family history of hypertension.          Medications  Allergies   Current Outpatient Prescriptions   Medication Sig Dispense Refill   ? acetaminophen (TYLENOL) 500 MG tablet Take 1,000 mg by mouth every 6 (six) hours as needed for pain.      ? aspirin 81 MG tablet Take 81 mg by mouth daily.     ? coQ10, liposomal ubiquinol, 8 mg/mL Liqd Take 10 mL by mouth daily.      ? coQ10, ubiquinol, 200 mg cap Take 1 capsule by mouth daily.     ? DULoxetine (CYMBALTA) 20 MG capsule Take 1 capsule by mouth daily.     ? fexofenadine (ALLEGRA) 180 MG tablet Take 180 mg by mouth daily as needed.      ? fluticasone (FLONASE) 50 mcg/actuation nasal spray 2 sprays into each nostril daily. 16 g 4   ? GLUC/SOO-MSM#1/VIT C/CLAUDETTE/BOR (GLUCOSAMINE-CHOND-MSM COMPLEX ORAL) Take 1 each by mouth daily.     ? meclizine (ANTIVERT) 12.5 mg tablet Take 1 tablet (12.5 mg total) by mouth 3 (three) times a day as needed for dizziness or nausea. 30 tablet 1   ? MV-MN/FA/LYCOPENE/LUT/HB#185 (DIABETIC SUPPORT FORMULA ORAL) Take 1 Package by mouth once daily. Takes a diabetic vitamin/supplement thomas- gets at [facility]   "   ? naproxen (NAPROSYN) 500 MG tablet Take 1 tablet (500 mg total) by mouth 3 (three) times a day with meals. 90 tablet 3   ? nitroglycerin (NITROSTAT) 0.4 MG SL tablet Place 1 tablet (0.4 mg total) under the tongue every 5 (five) minutes as needed for chest pain. 25 tablet 3   ? omeprazole (PRILOSEC) 40 MG capsule Take 1 capsule (40 mg total) by mouth as needed. Before a meal 90 capsule 0   ? sertraline (ZOLOFT) 50 MG tablet Take 1 tablet by mouth daily.     ? VIT C/VIT E/LUTEIN/MIN/OMEGA-3 (OCUVITE ORAL) Take 1 capsule by mouth daily.     ? DULoxetine (CYMBALTA) 30 MG capsule TAKE 1 CAPSULE DAILY 90 capsule 3   ? nitroglycerin (NITROSTAT) 0.4 MG SL tablet SEE ENCLOSED LITERATURE FOR DIRECTIONS ON HOW TO TAKE YOUR MEDICATION 100 tablet 2   ? pimavanserin (NUPLAZID) 17 mg Tab tablet Take 2 tablets (34 mg total) by mouth daily. 60 tablet 8   ? pramipexole (MIRAPEX) 0.25 MG tablet Take 1 tablet (0.25 mg total) by mouth 3 (three) times a day. 270 tablet 1   ? pregabalin (LYRICA) 100 MG capsule Take 1 capsule (100 mg total) by mouth 2 (two) times a day. 60 capsule 2   ? traZODone (DESYREL) 50 MG tablet Take 50 mg by mouth at bedtime.       No current facility-administered medications for this visit.       Allergies   Allergen Reactions   ? Codeine Itching     And sweating   ? Ibuprofen Itching   ? Morphine      Excessive sweating   ? Tagamet [Cimetidine]    ? Vicodin [Hydrocodone-Acetaminophen] Other (See Comments)     Profuse sweating          Lab Results   Lab Results   Component Value Date     01/05/2018    K 4.6 01/05/2018     01/05/2018    CO2 26 01/05/2018    BUN 20 01/05/2018    CREATININE 0.90 01/05/2018    CALCIUM 10.5 01/05/2018     Lab Results   Component Value Date    WBC 6.7 01/05/2018    WBC 6.5 07/21/2014    HGB 14.6 01/05/2018    HCT 44.3 01/05/2018    MCV 86 01/05/2018     01/05/2018     Lab Results   Component Value Date    CHOL 219 (H) 11/07/2016    TRIG 121 11/07/2016    HDL 49  11/07/2016    LDLCALC 146 (H) 11/07/2016     Lab Results   Component Value Date    INR 0.99 04/28/2014     Lab Results   Component Value Date    BNP 59 04/28/2014     Lab Results   Component Value Date    CKTOTAL 250 (H) 01/05/2018    CKTOTAL 208 (H) 05/18/2017    CKMB 5 05/01/2014    CKMB 5 04/28/2014    TROPONINI 0.02 01/05/2018    TROPONINI 0.04 05/10/2017    TROPONINI 0.01 07/21/2014     Lab Results   Component Value Date    TSH 1.43 05/18/2017

## 2021-06-26 NOTE — PROGRESS NOTES
"Progress Notes by Bibi Dempsey PT Student at 1/22/2018  2:05 PM     Author: Bibi Dempsey PT Student Service: -- Author Type: PT Student    Filed: 1/22/2018  3:26 PM Date of Service: 1/22/2018  2:05 PM Status: Attested    : Bibi Dempsey PT Student (PT Student) Cosigner: Missy Watkins PT at 1/22/2018  4:25 PM    Attestation signed by Missy Watkins PT at 1/22/2018  4:25 PM    SPT in direct supervision of PT with PT directing treatment and plan of care.  Missy Watkins PT, DPT, MTC, NCS                  Physical Therapy  Physical Therapy Daily Outpatient Documentation      Rx Units:  1-TE, 2-NR    Total OP Minutes: 45    Treatment #: 3/11  MEDICARE (1/19/201/ - 4/15/2018)    Pain: Denies pain but later complains of sciatica like symptoms in bilateral LE during ambulation.   Location:   Intervention:     Subjective:Pt reports nothing new since last therapy session, reports no major events with losing his balance. When asked about it, pt reports \"always a little dizzy but I've been fine as long as I don't close my eyes.\"    Therapeutic Exercise  Total Minutes: 18  -NuStep x 8min, Level 5, Seat 12, Arms 10    Avg. Mets 4.0      Obvious SOB observed following NuStep, pt required extended rest break to resolve.     - Squats with 5 lb dumbbells on blue foam x 10 reps x 2 sets. Cues to increase AMBER with upright posture and full, deep knee flexion, therapist CGA.   - Standing on bue foam with normal AMBER, pt held 4.4 lb yellow ball, out in front of himself with elbows fully extended, trunk/neck rotation to left and right x 10 reps. Pt cued on full rotation with UE, mild LOB that pt was able to self-correct, CGA.       Neuro Re-Ed/Balance  Total Minutes: 27    Ambulation with/without Challenges, all CGA for safety, no AD:  -Forward walking x 232 feet, VC for upright posture, patient reported mild pain in hips and legs, no increase in dizziness. Pt cued on increasing speed above baseline, despite " cuing pt was unable to increase gait speed.   - Following ambulation, pt required extended seated rest break to decrease SOB.  - Horizontal head turns 2 x 60 feet, VC for maintaining straight path, pt demonstrated decreased speed, experienced mild LOB with turns on L > R.   - Following ambulation, pt again required extended seated rest break, following vitals were taken:  BP on R: 164/86 mmHg  BP on L: 193/92 mmHg  Pulse: 101 bpm  O2 Sats: 95%   Following resolution of SOB,  - Vertical head turns 2 x 60 feet, VC for maintaining pace, pt performed well with no obvious LOB.   - Following ambulation,  O2 sats: 91% Pt's RPE, 6/10, Pulse: 97 bpm  - Pt took large steps over 6 small hurdles over the distance of 25 feet x 2 lengths, CGA. Pt has greatest difficulty with following through on the step and catching back foot. 2 mild LOB that required min A from therapist.   -Following exercise, pt was complaining of lumbar/leg pain with movement that he described as sharp and referring down his bilateral LE.       Gait Training  Total Minutes:       Other: Therapeutic Activity  Total Minutes:     Assessment/Plan for week of (1/22/18 - 1/29/18):  Pt presented to therapy today with increased SOB within minimal exertion that did resolve with extended rest. However, upon taking pt's BP, he was found to have elevated BP in bilateral UE. Pt explains that he has had high BP as of late, but never as high as during the session (193/92). Due to pt presentation of vitals, RPE, and complaint of back/leg pain, ended session early and advised pt to contact his primary physician to report the occurrences of high BP and to continue to monitor at home. Pt will continue to benefit from skilled 1:1 therapy in order to address balance and strength deficits. Plan to continue to focus on compound, functional closed chain exercises and incorporate increased dynamic balance activities as pt tolerates including obstacle courses, head turns, and other  ambulation tasks.       Additional Notes:

## 2021-06-26 NOTE — PROGRESS NOTES
Progress Notes by Frieda Cuenca PT Student at 3/23/2018  4:07 PM     Author: Frieda Cuenca PT Student Service: -- Author Type: PT Student    Filed: 3/23/2018  4:23 PM Date of Service: 3/23/2018  4:07 PM Status: Attested    : Frieda Cuenca PT Student (PT Student) Cosigner: Missy Watkins PT at 4/2/2018  4:40 PM    Attestation signed by Missy Watkins PT at 4/2/2018  4:40 PM    SPT in direct supervision of PT with PT directing treatment and plan of care.  Missy Watkins PT, DPT, MTC, NCS                  Physical Therapy  Physical Therapy  Movement Disorders Discharge Summary    Medical Diagnosis: Dizziness, falls    Treatment Diagnosis: unstable balance, gait instability/abnormality, decreased functional mobility/safety, back pain    Referring MD:Ryley To MD  Onset Date: 1/5/18   Start of Care: 1/8/18      Balance  Initial Post Treatment Goals   Activities specific balance confidence (ABC) scale 43% 30.63% >13% improvement MET   Gait Initial Post Treatment Goals   Gait speed meters/second  .71 .95 > .87 MET   Stairs ascend/descend 0 12 10 MET   Dynamic Gait Index (DGI) 8/24 18/24 14/24 MET       Functional Outcome Changes: Pt demonstrates an inc in gait speed, able to navigate around and over objects, ascend and descend stairs modified independently with HR and other aspects of functional gait within the community. Pt reports that he feels much better navigating the community and that his balance has improved since starting PT.     Evaluation: Goals Met? Yes Comments: See Above.    Patient seen from 1/8/18 to 3/23/18 for 10 treatment sessions.  Recommendation: D/C    Physician Recommendation:  1. I certify the need for these services furnished within this plan and while under my care. I agree with the therapist's recommendation for plan of care.    2. If there is any recommendation for modification of therapy plan, please indicate below.      Physician's Signature (Printed):   _____________________________

## 2021-06-27 NOTE — PROGRESS NOTES
Progress Notes by Henrik Breaux MD at 6/11/2019 12:50 PM     Author: Henrik Breaux MD Service: -- Author Type: Physician    Filed: 6/11/2019  1:04 PM Encounter Date: 6/11/2019 Status: Signed    : Henrik Breaux MD (Physician)                  [email or web address]  [phone]         Thank you Dr. To for asking the [facility] Heart Care team to participate in the care of your patient, Familia Sales.     Impression and Plan     1. Coronary disease. Familia has known coronary artery disease. Specifically, he had a history of having had a drug-eluting stent placement to the LAD in February 2014. He had this performed initially in [place]. He was noted to have at that time a significant right coronary artery stenosis and was being considered for stage intervention, but developed progressive chest pain symptoms. He ultimately underwent drug-eluting stent placement (3.0 x 16 mm) to the right coronary artery and was dismissed home 1 May 2014 post PCI.  ?  This is stable. Patient reports no chest pain or other concerning symptoms.  ?  2. Hypertension. Blood pressure was fairly reasonable in the office today at 136/68 mmHg.  ?  3. Dyslipidemia. Patient has been intolerant of statin therapy in the past.  As noted in previous communications, we have also have discussed possible trial of ezetimibe or other newer agents though the patient is reticent to pursue this.     Plan on follow-up in one year.  ?         History of Present Illness    Once again I would like to thank you again for asking me to participate in the care of your patient, Familia Sales.  As you know, but to reiterate for my own records, Familia Sales is a 88 y.o. male with history of having had a drug-eluting stent placement to the LAD in February 2014. He had this performed initially in [place]. He was noted to have at that time a significant right coronary artery stenosis and was being considered for stage intervention, but  developed progressive chest pain symptoms. He ultimately underwent drug-eluting stent placement (3.0 x 16 mm) to the right coronary artery and was dismissed home 1 May 2014 post PCI.  ?  On interview today, Familia states that he has been doing well from a cardiovascular standpoint. He specifically denies chest pain symptoms. He reports no shortness of breath, palpitations, or subjective decline in exercise tolerance other than that related to some orthopedic issues.      Further review of systems is otherwise negative/noncontributory (medical record and 13 point review of systems reviewed as well and pertinent positives noted).         Cardiac Diagnostics      Echocardiogram performed 29 April 2014:  1. Normal left ventricular size and systolic performance with ejection fraction of 60%.  2. No wall motion abnormalities.  3. Mild concentric increased LV wall thickness.  4. No significant valvular heart disease.  5. Moderate left atrial enlargement.    Regadenoson nuclear perfusion study 15 May 2015:  1. Subjectively and objectively negative regadenoson infusion.  2. Regadenoson nuclear imaging does not suggest any ischemia or prior scar.  3. Preserved wall motion as mentioned above without any obvious wall motion abnormalities.    Coronary angiogram 30 April 2014:  1. Right dominant coronary arterial system.  2. Previous stent in proximal LAD exhibited no restenosis.  3. First diagonal coronary artery exhibited moderate-severe disease in the proximal segment, however, vessel arises from within stent segment of proximal LAD.  4. PCI directed to right coronary artery stenosis (3.0×16 mm Promise Premier drug-eluting stent)    12-lead ECG (personally reviewed) 21 July 2014 revealed sinus rhythm with evidence of prior inferior infarct. ECG is unchanged from one May 2014.            Physical Examination       /68 (Patient Site: Right Arm, Patient Position: Sitting, Cuff Size: Adult Large)   Pulse 68   Resp 16   Ht  "5' 10\" (1.778 m)   Wt 202 lb (91.6 kg)   BMI 28.98 kg/m          Wt Readings from Last 3 Encounters:   06/11/19 202 lb (91.6 kg)   09/28/18 201 lb 14.4 oz (91.6 kg)   05/22/18 198 lb (89.8 kg)     The patient is alert and oriented times three. Sclerae are anicteric. Mucosal membranes are moist. Jugular venous pressure is normal. No significant adenopathy/thyromegally appreciated. Lungs are clear with good expansion. On cardiovascular exam, the patient has a regular S1 and S2. Abdomen is soft and non-tender. Extremities reveal no clubbing, cyanosis, or edema.         Family History/Social History/Risk Factors   Patient does not smoke.  Family history of hypertension.         Medications  Allergies   Current Outpatient Medications   Medication Sig Dispense Refill   ? acetaminophen (TYLENOL) 500 MG tablet Take 1,000 mg by mouth every 6 (six) hours as needed for pain.      ? aspirin 81 MG tablet Take 81 mg by mouth daily.     ? coQ10, liposomal ubiquinol, 8 mg/mL Liqd Take 10 mL by mouth daily.      ? coQ10, ubiquinol, 200 mg cap Take 1 capsule by mouth daily.     ? fexofenadine (ALLEGRA) 180 MG tablet Take 180 mg by mouth daily as needed.      ? MV-MN/FA/LYCOPENE/LUT/HB#185 (DIABETIC SUPPORT FORMULA ORAL) Take 1 Package by mouth once daily. Takes a diabetic vitamin/supplement thomas- gets at [facility]     ? naproxen (NAPROSYN) 500 MG tablet Take 1 tablet (500 mg total) by mouth 3 (three) times a day with meals. 270 tablet 3   ? nitroglycerin (NITROSTAT) 0.4 MG SL tablet Place 1 tablet (0.4 mg total) under the tongue every 5 (five) minutes as needed for chest pain. 25 tablet 3   ? sertraline (ZOLOFT) 50 MG tablet Take 75 mg by mouth daily.            ? VIT C/VIT E/LUTEIN/MIN/OMEGA-3 (OCUVITE ORAL) Take 1 capsule by mouth daily.     ? DULoxetine (CYMBALTA) 20 MG capsule Take 1 capsule by mouth daily.     ? DULoxetine (CYMBALTA) 30 MG capsule TAKE 1 CAPSULE DAILY 90 capsule 3   ? fluticasone (FLONASE) 50 mcg/actuation nasal " spray 2 sprays into each nostril daily. 16 g 4   ? GLUC/SOO-MSM#1/VIT C/CLAUDETTE/BOR (GLUCOSAMINE-CHOND-MSM COMPLEX ORAL) Take 1 each by mouth daily.     ? meclizine (ANTIVERT) 12.5 mg tablet Take 1 tablet (12.5 mg total) by mouth 3 (three) times a day as needed for dizziness or nausea. 30 tablet 1   ? omeprazole (PRILOSEC) 40 MG capsule Take 1 capsule (40 mg total) by mouth as needed. Before a meal 90 capsule 0   ? pimavanserin (NUPLAZID) 17 mg Tab tablet Take 2 tablets (34 mg total) by mouth daily. 60 tablet 8   ? pramipexole (MIRAPEX) 0.25 MG tablet Take 1 tablet (0.25 mg total) by mouth 3 (three) times a day. 270 tablet 1   ? pregabalin (LYRICA) 100 MG capsule Take 1 capsule (100 mg total) by mouth 2 (two) times a day. 60 capsule 2   ? traZODone (DESYREL) 50 MG tablet Take 50 mg by mouth at bedtime.       No current facility-administered medications for this visit.       Allergies   Allergen Reactions   ? Codeine Itching     And sweating   ? Ibuprofen Itching   ? Morphine      Excessive sweating   ? Tagamet [Cimetidine]    ? Vicodin [Hydrocodone-Acetaminophen] Other (See Comments)     Profuse sweating          Lab Results   Lab Results   Component Value Date     01/05/2018    K 4.6 01/05/2018     01/05/2018    CO2 26 01/05/2018    BUN 20 01/05/2018    CREATININE 0.90 01/05/2018    CALCIUM 10.5 01/05/2018     Lab Results   Component Value Date    WBC 6.7 01/05/2018    WBC 6.5 07/21/2014    HGB 14.6 01/05/2018    HCT 44.3 01/05/2018    MCV 86 01/05/2018     01/05/2018     Lab Results   Component Value Date    CHOL 219 (H) 11/07/2016    TRIG 121 11/07/2016    HDL 49 11/07/2016    LDLCALC 146 (H) 11/07/2016     Lab Results   Component Value Date    INR 0.99 04/28/2014     Lab Results   Component Value Date    BNP 59 04/28/2014     Lab Results   Component Value Date    CKTOTAL 250 (H) 01/05/2018    CKTOTAL 208 (H) 05/18/2017    CKMB 5 05/01/2014    CKMB 5 04/28/2014    TROPONINI 0.02 01/05/2018     TROPONINI 0.04 05/10/2017    TROPONINI 0.01 07/21/2014     Lab Results   Component Value Date    TSH 1.43 05/18/2017

## 2021-06-29 NOTE — PROGRESS NOTES
"Progress Notes by Henrik Breaux MD at 7/7/2020  1:30 PM     Author: Henrik Breaux MD Service: -- Author Type: Physician    Filed: 7/7/2020  1:33 PM Encounter Date: 7/7/2020 Status: Signed    : Henrik Breaux MD (Physician)          The patient has been notified of following:     \"This telephone visit will be conducted via a call between you and your physician/provider. We have found that certain health care needs can be provided without the need for a physical exam.  This service lets us provide the care you need with a phone conversation.  If a prescription is necessary we can send it directly to your pharmacy.  If lab work is needed we can place an order for that and you can then stop by our lab to have the test done at a later time. If during the course of the call the physician/provider feels a telephone visit is not appropriate, you will not be charged for this service.\" Verbal consent has been obtained for this service by care team member:         HEART CARE PHONE ENCOUNTER     The patient has chosen to have the visit conducted as a telephone visit, to reduce risk of exposure given the current status of Coronavirus in our community. This telephone visit is being conducted via a call between the patient and physician/provider. Health care needs are being provided without a physical exam.      Impression and Plan     1. Coronary disease. Familia has known coronary artery disease. Specifically, he had a history of having had a drug-eluting stent placement to the LAD in February 2014. He had this performed initially in [place]. He was noted to have at that time a significant right coronary artery stenosis and was being considered for stage intervention, but developed progressive chest pain symptoms. He ultimately underwent drug-eluting stent placement (3.0 x 16 mm) to the right coronary artery and was dismissed home 1 May 2014 post PCI.  ?  This is stable. Patient reports no chest " pain or other concerning symptoms.  ?  2. Hypertension.  Patient reports favorable readings on his home blood pressure monitoring device.  ?  3. Dyslipidemia. Patient has been intolerant of statin therapy in the past.  As noted in previous communications, we have also have discussed possible trial of ezetimibe or other newer agents though the patient is reticent to pursue this.      Plan on follow-up in one year.  ?    Total time of call between patient and provider was 11 minutes     Start Time: 1323    Stop Time: 1334         History of Present Illness    Familia Sales is a 89 y.o. male who is being evaluated via a billable telephone visit.     Once again I would like to thank you again for asking me to participate in the care of your patient, Familia Sales.  As you know, but to reiterate for my own records, Familia Sales is a 89 y.o. male with history of having had a drug-eluting stent placement to the LAD in February 2014. He had this performed initially in [place]. He was noted to have at that time a significant right coronary artery stenosis and was being considered for stage intervention, but developed progressive chest pain symptoms. He ultimately underwent drug-eluting stent placement (3.0 x 16 mm) to the right coronary artery and was dismissed home 1 May 2014 post PCI.  ?  On interview today, Familia states that he has been doing well from a cardiovascular standpoint. He specifically denies chest pain symptoms. He reports no shortness of breath, palpitations, or subjective decline in exercise tolerance other than that related to some orthopedic issues.    He reports no fevers, chills, or other constitutional symptoms.     Further review of systems is otherwise negative/noncontributory (medical record and 13 point review of systems reviewed as well and pertinent positives noted).         Cardiac Diagnostics        Echocardiogram performed 29 April 2014:  1. Normal left ventricular size and systolic performance with  ejection fraction of 60%.  2. No wall motion abnormalities.  3. Mild concentric increased LV wall thickness.  4. No significant valvular heart disease.  5. Moderate left atrial enlargement.    Regadenoson nuclear perfusion study 15 May 2015:  1. Subjectively and objectively negative regadenoson infusion.  2. Regadenoson nuclear imaging does not suggest any ischemia or prior scar.  3. Preserved wall motion as mentioned above without any obvious wall motion abnormalities.    Coronary angiogram 30 April 2014:  1. Right dominant coronary arterial system.  2. Previous stent in proximal LAD exhibited no restenosis.  3. First diagonal coronary artery exhibited moderate-severe disease in the proximal segment, however, vessel arises from within stent segment of proximal LAD.  4. PCI directed to right coronary artery stenosis (3.0×16 mm Promise Premier drug-eluting stent)    12-lead ECG (personally reviewed) 21 July 2014 revealed sinus rhythm with evidence of prior inferior infarct. ECG is unchanged from one May 2014.          Physical Examination           Wt Readings from Last 3 Encounters:   11/14/19 193 lb (87.5 kg)   06/11/19 202 lb (91.6 kg)   09/28/18 201 lb 14.4 oz (91.6 kg)     The patient has chosen to have the visit conducted as a telephone visit, to reduce risk of exposure given the current status of Coronavirus in our community. This telephone visit is being conducted via a call between the patient and physician/provider. Health care needs are being provided without a physical exam.        Family History/Social History/Risk Factors   Patient does not smoke.  Family history reviewed, and family history includes Cancer in his father and mother.        Medications  Allergies   Current Outpatient Medications   Medication Sig Dispense Refill   ? acetaminophen (TYLENOL) 500 MG tablet Take 1,000 mg by mouth every 6 (six) hours as needed for pain.      ? aspirin 81 MG tablet Take 81 mg by mouth daily.     ? celecoxib  (CELEBREX) 100 MG capsule Take 100 mg by mouth as needed.      ? coQ10, ubiquinol, 200 mg cap Take 1 capsule by mouth daily.     ? glucosamine sulfate 500 mg cap Take 200 mg by mouth daily.      ? ipratropium (ATROVENT) 42 mcg (0.06 %) nasal spray 2 sprays into each nostril daily as needed for rhinitis.            ? ketoconazole (NIZORAL) 2 % shampoo SHAMPOO SCALP TOPICALLY 3 TIMES WEEKLY *LATHER FOR 5 MINUTES THEN RINSE*  FOR FLAKING     ? lidocaine (LIDODERM) 5 % Place 1 patch on the skin daily as needed. Remove & Discard patch within 12 hours or as directed by MD     ? nitroglycerin (NITROSTAT) 0.4 MG SL tablet as needed.     ? olopatadine (PATANOL) 0.1 % ophthalmic solution Administer 1 drop to both eyes 2 (two) times a day as needed.      ? sertraline (ZOLOFT) 50 MG tablet Take 75 mg by mouth daily.            ? VIT C/VIT E/LUTEIN/MIN/OMEGA-3 (OCUVITE ORAL) Take 1 capsule by mouth daily.       No current facility-administered medications for this visit.       Allergies   Allergen Reactions   ? Codeine Itching     And sweating   ? Ibuprofen Itching   ? Morphine      Excessive sweating   ? Tagamet [Cimetidine]    ? Vicodin [Hydrocodone-Acetaminophen] Other (See Comments)     Profuse sweating          Lab Results   Lab Results   Component Value Date     11/14/2019    K 4.1 11/14/2019     11/14/2019    CO2 25 11/14/2019    BUN 19 11/14/2019    CREATININE 0.79 11/14/2019    CALCIUM 10.4 11/14/2019     Lab Results   Component Value Date    WBC 6.7 11/14/2019    WBC 6.5 07/21/2014    HGB 10.4 (L) 11/14/2019    HCT 33.5 (L) 11/14/2019    MCV 83 11/14/2019     11/14/2019     Lab Results   Component Value Date    CHOL 219 (H) 11/07/2016    TRIG 121 11/07/2016    HDL 49 11/07/2016    LDLCALC 146 (H) 11/07/2016     Lab Results   Component Value Date    INR 1.10 11/14/2019     Lab Results   Component Value Date    BNP 59 04/28/2014     Lab Results   Component Value Date    CKTOTAL 250 (H) 01/05/2018     CKTOTAL 208 (H) 05/18/2017    CKMB 5 05/01/2014    CKMB 5 04/28/2014    TROPONINI 0.05 11/14/2019    TROPONINI 0.02 01/05/2018    TROPONINI 0.04 05/10/2017     Lab Results   Component Value Date    TSH 1.43 05/18/2017           Medical History  Surgical History   Past Medical History:   Diagnosis Date   ? Arthritis    ? Back pain    ? Chest pain    ? Coronary artery disease    ? Diabetes mellitus (H)     type II   ? Hearing loss of both ears    ? Heart attack (H) 2/14/2014      Past Surgical History:   Procedure Laterality Date   ? APPENDECTOMY  1950   ? CARPAL TUNNEL RELEASE Right 10/29/2014   ? CORONARY STENT PLACEMENT  4/30/2014   ? decompression l3     ? EYE SURGERY     ? FOOT SURGERY     ? HERNIA REPAIR     ? REPLACEMENT TOTAL KNEE     ? spinal fusion L4-L5

## 2021-07-03 NOTE — ADDENDUM NOTE
Addendum Note by Missy Watkins PT at 8/2/2017  1:37 PM     Author: Missy Watkins PT Service: -- Author Type: Physical Therapist    Filed: 8/2/2017  1:37 PM Date of Service: 8/2/2017  1:37 PM Status: Signed    : Missy Watkins PT (Physical Therapist)    Encounter addended by: Missy Watkins PT on: 8/2/2017  1:37 PM<BR>     Actions taken: Charge Capture section accepted

## 2021-09-04 ENCOUNTER — HEALTH MAINTENANCE LETTER (OUTPATIENT)
Age: 86
End: 2021-09-04

## 2021-12-25 ENCOUNTER — HEALTH MAINTENANCE LETTER (OUTPATIENT)
Age: 86
End: 2021-12-25

## 2022-02-01 ENCOUNTER — HOSPITAL ENCOUNTER (EMERGENCY)
Facility: CLINIC | Age: 87
Discharge: HOME | End: 2022-02-01
Attending: EMERGENCY MEDICINE | Admitting: EMERGENCY MEDICINE
Payer: MEDICARE

## 2022-02-01 ENCOUNTER — APPOINTMENT (OUTPATIENT)
Dept: CT IMAGING | Facility: CLINIC | Age: 87
End: 2022-02-01
Attending: EMERGENCY MEDICINE
Payer: MEDICARE

## 2022-02-01 VITALS
WEIGHT: 190 LBS | HEART RATE: 63 BPM | TEMPERATURE: 97.9 F | RESPIRATION RATE: 18 BRPM | BODY MASS INDEX: 27.66 KG/M2 | DIASTOLIC BLOOD PRESSURE: 69 MMHG | OXYGEN SATURATION: 98 % | SYSTOLIC BLOOD PRESSURE: 119 MMHG

## 2022-02-01 DIAGNOSIS — R33.9 URINARY RETENTION: ICD-10-CM

## 2022-02-01 LAB
ALBUMIN UR-MCNC: NEGATIVE MG/DL
ANION GAP SERPL CALCULATED.3IONS-SCNC: 4 MMOL/L (ref 3–14)
APPEARANCE UR: CLEAR
BASOPHILS # BLD AUTO: 0 10E3/UL (ref 0–0.2)
BASOPHILS NFR BLD AUTO: 0 %
BILIRUB UR QL STRIP: NEGATIVE
BUN SERPL-MCNC: 18 MG/DL (ref 7–30)
CALCIUM SERPL-MCNC: 10.1 MG/DL (ref 8.5–10.1)
CHLORIDE BLD-SCNC: 110 MMOL/L (ref 94–109)
CO2 SERPL-SCNC: 27 MMOL/L (ref 20–32)
COLOR UR AUTO: YELLOW
CREAT SERPL-MCNC: 0.83 MG/DL (ref 0.66–1.25)
EOSINOPHIL # BLD AUTO: 0 10E3/UL (ref 0–0.7)
EOSINOPHIL NFR BLD AUTO: 0 %
ERYTHROCYTE [DISTWIDTH] IN BLOOD BY AUTOMATED COUNT: 13.7 % (ref 10–15)
GFR SERPL CREATININE-BSD FRML MDRD: 83 ML/MIN/1.73M2
GLUCOSE BLD-MCNC: 178 MG/DL (ref 70–99)
GLUCOSE UR STRIP-MCNC: NEGATIVE MG/DL
HCT VFR BLD AUTO: 43.2 % (ref 40–53)
HGB BLD-MCNC: 14.7 G/DL (ref 13.3–17.7)
HGB UR QL STRIP: NEGATIVE
HOLD SPECIMEN: NORMAL
HOLD SPECIMEN: NORMAL
IMM GRANULOCYTES # BLD: 0 10E3/UL
IMM GRANULOCYTES NFR BLD: 0 %
KETONES UR STRIP-MCNC: NEGATIVE MG/DL
LEUKOCYTE ESTERASE UR QL STRIP: NEGATIVE
LYMPHOCYTES # BLD AUTO: 1.6 10E3/UL (ref 0.8–5.3)
LYMPHOCYTES NFR BLD AUTO: 16 %
MCH RBC QN AUTO: 30.5 PG (ref 26.5–33)
MCHC RBC AUTO-ENTMCNC: 34 G/DL (ref 31.5–36.5)
MCV RBC AUTO: 90 FL (ref 78–100)
MONOCYTES # BLD AUTO: 0.8 10E3/UL (ref 0–1.3)
MONOCYTES NFR BLD AUTO: 8 %
NEUTROPHILS # BLD AUTO: 7.3 10E3/UL (ref 1.6–8.3)
NEUTROPHILS NFR BLD AUTO: 76 %
NITRATE UR QL: NEGATIVE
NRBC # BLD AUTO: 0 10E3/UL
NRBC BLD AUTO-RTO: 0 /100
PH UR STRIP: 6.5 [PH] (ref 5–7)
PLATELET # BLD AUTO: 180 10E3/UL (ref 150–450)
POTASSIUM BLD-SCNC: 3.8 MMOL/L (ref 3.4–5.3)
RBC # BLD AUTO: 4.82 10E6/UL (ref 4.4–5.9)
RBC URINE: 1 /HPF
SODIUM SERPL-SCNC: 141 MMOL/L (ref 133–144)
SP GR UR STRIP: 1.01 (ref 1–1.03)
UROBILINOGEN UR STRIP-MCNC: NORMAL MG/DL
WBC # BLD AUTO: 9.8 10E3/UL (ref 4–11)
WBC URINE: <1 /HPF

## 2022-02-01 PROCEDURE — 85025 COMPLETE CBC W/AUTO DIFF WBC: CPT | Performed by: EMERGENCY MEDICINE

## 2022-02-01 PROCEDURE — G1004 CDSM NDSC: HCPCS

## 2022-02-01 PROCEDURE — 250N000011 HC RX IP 250 OP 636: Performed by: EMERGENCY MEDICINE

## 2022-02-01 PROCEDURE — 250N000009 HC RX 250: Performed by: EMERGENCY MEDICINE

## 2022-02-01 PROCEDURE — 81001 URINALYSIS AUTO W/SCOPE: CPT | Performed by: EMERGENCY MEDICINE

## 2022-02-01 PROCEDURE — 82310 ASSAY OF CALCIUM: CPT | Performed by: EMERGENCY MEDICINE

## 2022-02-01 PROCEDURE — 99285 EMERGENCY DEPT VISIT HI MDM: CPT | Mod: 25

## 2022-02-01 PROCEDURE — 36415 COLL VENOUS BLD VENIPUNCTURE: CPT | Performed by: EMERGENCY MEDICINE

## 2022-02-01 PROCEDURE — 51702 INSERT TEMP BLADDER CATH: CPT

## 2022-02-01 RX ORDER — IOPAMIDOL 755 MG/ML
93 INJECTION, SOLUTION INTRAVASCULAR ONCE
Status: COMPLETED | OUTPATIENT
Start: 2022-02-01 | End: 2022-02-01

## 2022-02-01 RX ADMIN — SODIUM CHLORIDE 73 ML: 9 INJECTION, SOLUTION INTRAVENOUS at 12:32

## 2022-02-01 RX ADMIN — IOPAMIDOL 93 ML: 755 INJECTION, SOLUTION INTRAVENOUS at 12:32

## 2022-02-01 NOTE — DISCHARGE INSTRUCTIONS
Blood work, urinalysis, and CT scans were reassuring.    You were not able to pass urine in your bladder was distended which was the likely cause of your symptoms.    Referral for urology follow-up is been placed and you will need to see them to have Kinney catheter removed.    If your symptoms worsen or you develop any new or concerning symptoms, fever greater than 100.4, chills, persistent nausea vomiting, you should return to emergency department immediately for further evaluation and treatment.    For constipation I recommend metamucil.

## 2022-02-01 NOTE — ED TRIAGE NOTES
Pt here with left flank and groin pain for past two days. Hx of kidney stones requiring surgery in the past.

## 2022-02-01 NOTE — ED NOTES
Discharge info reviewed with pt, instructed on dueñas care, dueñas switched to leg bag. Pt sent with some catheter care supplies. Pt ambulatory out of ED.

## 2022-02-01 NOTE — ED PROVIDER NOTES
History     Chief Complaint   Patient presents with     Flank Pain     Groin Pain     HPI  Familia Sales is a 89 year old male with history significant for nephrolithiasis requiring instrumentation, diabetes type 2, chronic iron deficiency anemia, coronary artery disease, VTE, degenerative disc disease of the lumbar spine, neoplasm of prostate, who presents emergency department for evaluation of flank and groin pain.  Symptoms began yesterday and are intermittent.  Can be severe at times.  Says it does feel like prior episodes of ureteral colic.  Also noticed a decrease in his urine stream yesterday.  Denies any pain with urination.  Denies fevers, chills, nausea, vomiting or diarrhea.  Feels as if he has to have a bowel movement but is only having small firm balls of stool.  No blood or melena.    The patient's PMHx, Surgical Hx, Allergies, and Medications were all reviewed with the patient.    Allergies:  Allergies   Allergen Reactions     Codeine Itching     Duloxetine      Dye [Contrast Dye]      ONE REACTION TO INJECTED DYE IN SHOULDER, ABOUT MAY 1965.     Morphine      Motrin [Ibuprofen Micronized] Itching     Tagamet Itching     Vicodin [Hydrocodone-Acetaminophen]      EXTREME SWEATING, FLUSHING AND LIGHT-HEADEDNESS.       Hay Fever & [A.R.M.]        Problem List:    Patient Active Problem List    Diagnosis Date Noted     Hyperlipidemia 03/19/2021     Priority: Medium     Acute myocardial infarction of other anterior wall, initial episode of care 03/19/2021     Priority: Medium     Formatting of this note might be different from the original.  Created by Conversion       Age-related macular degeneration 03/19/2021     Priority: Medium     Blepharitis 03/19/2021     Priority: Medium     Nephrolithiasis 03/19/2021     Priority: Medium     Degeneration of lumbar or lumbosacral intervertebral disc 03/19/2021     Priority: Medium     Formatting of this note might be different from the original.  Created by  Conversion       Depressive disorder 03/19/2021     Priority: Medium     Jul 27, 2018 Entered By: LICO MCHUGH Comment: Dr. Estrella, psychiatrist,       Generalized osteoarthritis of multiple sites 03/19/2021     Priority: Medium     Formatting of this note might be different from the original.  Created by Conversion       High prostate specific antigen (PSA) 03/19/2021     Priority: Medium     Mar 24, 2016 Entered By: JACQUIE KENNEDY Comment: 2/16' see by  who will cont to moniter for now.       Hypercalcemia 03/19/2021     Priority: Medium     Hypertrophy of prostate without urinary obstruction and other lower urinary tract symptoms (LUTS) 03/19/2021     Priority: Medium     Feb 14, 2019 Entered By: LICO MCHUGH Comment: 3/2017       Neoplasm of prostate 03/19/2021     Priority: Medium     May 05, 2015 Entered By: JACQUIE KENNEDY Comment: was seen by  who recommended annual PSA and SHAHRZAD       Chronic low back pain 03/19/2021     Priority: Medium     Jan 31, 2012 Entered By: JACQUIE KENNEDY Comment: has had two back surgeries in [place], has had back injection w/Jan 31, 2012 Entered By: JACQUIE KENNEDY Comment: limited response had spinal stim placed 3/2011 w/goodJan 31, 2012 Entered By: JACQUIE KENNEDY Comment: response       Paresthesia of lower extremity 03/19/2021     Priority: Medium     Sciatica 03/19/2021     Priority: Medium     Formatting of this note might be different from the original.  Created by Conversion       Memory difficulties 09/30/2020     Priority: Medium     Chronic iron deficiency anemia 07/22/2020     Priority: Medium     Type 2 diabetes mellitus without complications (H) 07/22/2020     Priority: Medium     Major depressive disorder, single episode, in partial remission (H) 07/22/2020     Priority: Medium     TESSY (obstructive sleep apnea) 07/22/2020     Priority: Medium     Regular astigmatism of both eyes 07/22/2020     Priority: Medium     Mixed conductive and  sensorineural hearing loss of both ears 07/22/2020     Priority: Medium     Coronary artery disease involving native coronary artery of native heart without angina pectoris 07/22/2020     Priority: Medium     History of DVT of lower extremity 07/22/2020     Priority: Medium     Primary osteoarthritis of left hip 07/22/2020     Priority: Medium     Lumbar radiculopathy 07/22/2020     Priority: Medium     Anxiety 01/23/2018     Priority: Medium     Parkinsonian features 05/18/2017     Priority: Medium     Pre-syncope 07/21/2014     Priority: Medium     Degenerative spinal arthritis 10/13/2010     Priority: Medium     Formatting of this note might be different from the original.  L2-L3       Allergic rhinitis 09/08/2009     Priority: Medium        Past Medical History:    No past medical history on file.    Past Surgical History:    Past Surgical History:   Procedure Laterality Date     APPENDECTOMY  1950     CORONARY STENT PLACEMENT  4/30/2014     EYE SURGERY       FOOT SURGERY       HC DEST LOC LES CHOROID, PHOTOCOAG >=1 SESSION  12/13/06    LASIK     HERNIA REPAIR       HERNIA REPAIR, UMBILICAL  09/19/06     OTHER SURGICAL HISTORY      decompression l3     OTHER SURGICAL HISTORY      spinal fusion L4-L5     RELEASE CARPAL TUNNEL Right 10/29/2014     REPLACEMENT TOTAL KNEE       SURGICAL HISTORY OF -   APRIL 1959    CARTILAGE REMOVED FROM NOSE     SURGICAL HISTORY OF -   08/27/2008    LUMBAR DECOMPRESSION AT L2-3 AND L3-4     SURGICAL HISTORY OF -   1/20/09 THRU 3/06/09    CORTIZONE INJECTIOSN A SERIES OF 8     SURGICAL HISTORY OF -   8/09    INJECTION L3 NERVE ENDING     [facility] ANESTH,LUMBAR SPINE,CORD SURGERY  10/28/97 & 09/21/98    L3 to sacrum with spinal fusion L4-L% and decompression L5-S1     [facility] ANESTH,TOTAL KNEE ARTHROPLASTY  04/16/03    with revision left 08/02/04     [facility] APPENDECTOMY  MAY 1949 OR 1950     [facility] FOOT/TOES SURGERY PROC UNLISTED  MARCH 1989       Family History:    Family History   Problem  Relation Age of Onset     Gastrointestinal Disease Mother         ulcers     Cancer Mother      Cancer Maternal Grandfather      Depression Daughter      Liver Disease Daughter      Diabetes Daughter      Cancer Father        Social History:  Marital Status:   [2]  Social History     Tobacco Use     Smoking status: Former Smoker     Packs/day: 1.00     Years: 24.00     Pack years: 24.00     Types: Cigarettes     Quit date: 1964     Years since quittin.1     Smokeless tobacco: Never Used   Substance Use Topics     Alcohol use: Yes     Comment: very rare     Drug use: No        Medications:    acetaminophen (TYLENOL) 500 MG tablet  amoxicillin (AMOXIL) 500 MG capsule  aspirin 81 MG EC tablet  celecoxib (CELEBREX) 100 MG capsule  [employer] GLUCOSAMINE-CHONDROIT-MSM PO  ferrous sulfate (FE TABS) 325 (65 Fe) MG EC tablet  hydrocortisone 2.5 % cream  ketoconazole (NIZORAL) 2 % external shampoo  lidocaine (LIDODERM) 5 % patch  nitroGLYcerin (NITROSTAT) 0.4 MG sublingual tablet  olopatadine (PATANOL) 0.1 % ophthalmic solution  sertraline (ZOLOFT) 50 MG tablet          Review of Systems   A complete review of systems performed and is otherwise negative except as noted in the HPI.     Physical Exam   BP: 135/81  Pulse: 73  Temp: 97.9  F (36.6  C)  Resp: 18  Weight: 86.2 kg (190 lb)  SpO2: 95 %    Physical Exam  GEN: Awake, alert, and cooperative. N appears Barbara distress but nontoxic.  Resting comfortably in semirecumbent position.  HENT: MMM. External ears and nose normal bilaterally.  EYES: EOM intact. Conjunctiva clear. No discharge.   NECK: Symmetric, freely mobile.   CV : Extremities warm well perfused.  PULM: Normal effort.  Speaking full sentences.  ABD: Soft nondistended.  Generalized abdominal tenderness to palpation without involuntary guarding or rebound tenderness.  Maximal tenderness in left lower quadrant.  BACK: Left-sided CVA tenderness.  NEURO: Normal speech. Following commands. CN II-XII grossly  intact. Answering questions and interacting appropriately.   EXT: No gross deformity.  No pitting pedal or pretibial edema.  INT: Warm. No diaphoresis. Normal color.        ED Course        Procedures          Critical Care time:  none               Results for orders placed or performed during the hospital encounter of 02/01/22 (from the past 24 hour(s))   CBC with platelets differential    Narrative    The following orders were created for panel order CBC with platelets differential.  Procedure                               Abnormality         Status                     ---------                               -----------         ------                     CBC with platelets and d...[[id number]]                      Final result                 Please view results for these tests on the individual orders.   Basic metabolic panel   Result Value Ref Range    Sodium 141 133 - 144 mmol/L    Potassium 3.8 3.4 - 5.3 mmol/L    Chloride 110 (H) 94 - 109 mmol/L    Carbon Dioxide (CO2) 27 20 - 32 mmol/L    Anion Gap 4 3 - 14 mmol/L    Urea Nitrogen 18 7 - 30 mg/dL    Creatinine 0.83 0.66 - 1.25 mg/dL    Calcium 10.1 8.5 - 10.1 mg/dL    Glucose 178 (H) 70 - 99 mg/dL    GFR Estimate 83 >60 mL/min/1.73m2   [place] Draw    Narrative    The following orders were created for panel order [place] Draw.  Procedure                               Abnormality         Status                     ---------                               -----------         ------                     Extra Blue Top Tube[[id number]]                              Final result               Extra Red Top Tube[[id number]]                               Final result                 Please view results for these tests on the individual orders.   CBC with platelets and differential   Result Value Ref Range    WBC Count 9.8 4.0 - 11.0 10e3/uL    RBC Count 4.82 4.40 - 5.90 10e6/uL    Hemoglobin 14.7 13.3 - 17.7 g/dL    Hematocrit 43.2 40.0 - 53.0 %    MCV 90 78 - 100 fL     MCH 30.5 26.5 - 33.0 pg    MCHC 34.0 31.5 - 36.5 g/dL    RDW 13.7 10.0 - 15.0 %    Platelet Count 180 150 - 450 10e3/uL    % Neutrophils 76 %    % Lymphocytes 16 %    % Monocytes 8 %    % Eosinophils 0 %    % Basophils 0 %    % Immature Granulocytes 0 %    NRBCs per 100 WBC 0 <1 /100    Absolute Neutrophils 7.3 1.6 - 8.3 10e3/uL    Absolute Lymphocytes 1.6 0.8 - 5.3 10e3/uL    Absolute Monocytes 0.8 0.0 - 1.3 10e3/uL    Absolute Eosinophils 0.0 0.0 - 0.7 10e3/uL    Absolute Basophils 0.0 0.0 - 0.2 10e3/uL    Absolute Immature Granulocytes 0.0 <=0.4 10e3/uL    Absolute NRBCs 0.0 10e3/uL   Extra Blue Top Tube   Result Value Ref Range    Hold Specimen JIC    Extra Red Top Tube   Result Value Ref Range    Hold Specimen JIC    UA with Microscopic reflex to Culture    Specimen: Urine, Kinney Catheter   Result Value Ref Range    Color Urine Yellow Colorless, Straw, Light Yellow, Yellow    Appearance Urine Clear Clear    Glucose Urine Negative Negative mg/dL    Bilirubin Urine Negative Negative    Ketones Urine Negative Negative mg/dL    Specific Gravity Urine 1.010 1.003 - 1.035    Blood Urine Negative Negative    pH Urine 6.5 5.0 - 7.0    Protein Albumin Urine Negative Negative mg/dL    Urobilinogen Urine Normal Normal, 2.0 mg/dL    Nitrite Urine Negative Negative    Leukocyte Esterase Urine Negative Negative    RBC Urine 1 <=2 /HPF    WBC Urine <1 <=5 /HPF    Narrative    Urine Culture not indicated   CT Abdomen Pelvis w/o & w Contrast    Narrative    CT ABDOMEN AND PELVIS WITHOUT AND WITH CONTRAST  2/1/2022 12:47 PM    CLINICAL HISTORY: Left flank and lower abdominal pain.    TECHNIQUE: CT scan of the abdomen and pelvis was performed following  injection of IV contrast. Multiplanar reformats were obtained. Dose  reduction techniques were used.  CONTRAST: 93 mL Isovue 370    COMPARISON: None.    FINDINGS:   LOWER CHEST: Mild fibrotic changes about the periphery of both lung  bases. Moderate to large hiatal hernia.  Coronary artery calcification.    HEPATOBILIARY: Unremarkable. No hepatic masses are seen.    PANCREAS: Normal.    SPLEEN: Normal.    ADRENAL GLANDS: Normal.    KIDNEYS/BLADDER: Nonobstructing 0.2 cm stone in the interpolar region  of the left kidney. Nonobstructing punctate 0.1 cm stone in the lower  pole of the right kidney. No ureteral calculi or hydronephrosis. Few  small renal cortical cysts on the left would typically require no  routine follow-up. The kidneys are otherwise unremarkable. Kinney  catheter in the bladder. Small amount of air within the urinary  bladder is likely related to the presence of the Kinney catheter.    BOWEL: Moderate to large amount of stool in the rectum. Moderate  amount of stool throughout the colon. No convincing evidence for  colitis or diverticulitis. Prior appendectomy.    PELVIC ORGANS: Mild prostatic enlargement and central calcification.  Multiple images through the pelvis are degraded by artifact related to  a left hip arthroplasty.    LYMPH NODES: No enlarged lymph nodes are identified in the abdomen or  pelvis.    VASCULATURE: Advanced atherosclerotic aortoiliac calcification.    ADDITIONAL FINDINGS: None.    MUSCULOSKELETAL: Postoperative changes of posterior daniel and pedicle  screw fusion at L4-L5. Degenerative changes are noted throughout the  visualized thoracolumbar spine. Diffuse osteopenia.      Impression    IMPRESSION:   1.  Small nonobstructing stones are noted in both kidneys. No ureteral  calculi or hydronephrosis.  2.  Moderate to large amount of stool in the rectum suggests  constipation. Moderate amount of stool throughout the colon.  3.  Mild prostatic enlargement.  4.  Moderate to large hiatal hernia.       Medications   iopamidol (ISOVUE-370) solution 93 mL (93 mLs Intravenous Given 2/1/22 1232)   sodium chloride 0.9 % bag 500mL for CT scan flush use (73 mLs As instructed Given 2/1/22 1232)       Assessments & Plan (with Medical Decision Making)   89 year  old male with past medical history significant for prostate neoplasm, nephrolithiasis status post instrumentation, diabetes type 2, chronic anemia, degenerative disc disease of lumbar spine, with 2 days of intermittent left-sided flank and lower abdominal pain.  No overt infectious symptoms.  On exam he does have left-sided CVA tenderness.  He also does have generalized abdominal tenderness which is maximal in his left lower quadrant.  Patient unable to urinate spontaneously.  Point-of-care ultrasound shows a moderate amount of urine in his bladder.  Kinney catheter placed with decompression of bladder.    Urinalysis without evidence of acute infection.  CBC lizeth.  And BMP grossly normal.  CT scans of abdomen pelvis not show any urolithiasis, diverticulitis, or acute abdominal process to account for his symptoms.  There was constipation noted.  On reevaluation after bladder decompression his abdominal test tenderness had resolved.    My suspicion is that his urinary retention is the cause of his symptoms.  On exam he did seem to have mild left-sided CVA tenderness.  However he does say it is difficult for him to tell the difference between his chronic back pain.  Given no infectious symptoms or signs of acute infection on the lab I have low suspicion for pyelonephritis.    Adult urology referral placed where he can have a trial of void and catheter removal.  ED return precautions discussed.    I have reviewed the nursing notes.         New Prescriptions    No medications on file       Final diagnoses:   Urinary retention     Sebastian Minaya MD    2/1/2022   [facility] EMERGENCY DEPT    Disclaimer: This note consists of words and symbols derived from keyboarding and dictation using voice recognition software.  As a result, there may be errors that have gone undetected.  Please consider this when interpreting information found in this note.             Sebastian Minaya MD  02/01/22 [id number]

## 2022-02-02 ENCOUNTER — TELEPHONE (OUTPATIENT)
Dept: UROLOGY | Facility: CLINIC | Age: 87
End: 2022-02-02
Payer: MEDICARE

## 2022-02-02 NOTE — TELEPHONE ENCOUNTER
[facility] Call Center    Phone Message    May a detailed message be left on voicemail: yes     Reason for Call: Appointment Intake    Referring Provider Name: Sebastian Minaya MD  Diagnosis and/or Symptoms: Urinary retention    Action Taken: Message routed to:  Other: [facility]    Travel Screening: Not Applicable

## 2022-02-02 NOTE — TELEPHONE ENCOUNTER
Patient advised of 1st avail appt. Pt spouse sees [facility] and will try there.  Mary YUSUF   [facility]

## 2022-02-04 ENCOUNTER — NURSE TRIAGE (OUTPATIENT)
Dept: NURSING | Facility: CLINIC | Age: 87
End: 2022-02-04

## 2022-02-04 NOTE — TELEPHONE ENCOUNTER
Pt appt was with:  Appointment    2/4/2022  [facility] Bebe Bhatt MD    Family Medicine       This was not a Urology office. (Patient may have misunderstood the appt when made)     Pt previously stated was going to obtain Urology Appt with his Wife's Urology clinic at [facility] in [place] and stated [facility].  Pt was advised he can call us back at any time if he decides he does wish to come to appt here at [facility] for next avail appt.   Routing to  clinic to see about rescheduling  Mary YUSUF   [facility]ty [facility] RN

## 2022-02-04 NOTE — TELEPHONE ENCOUNTER
Familia was in [facility] on 2/1/22.  Urinary retention  Catheter placed.  Instructed to see urology in follow-up.  Can't be seen in urology until May.  [facility].      He has blood coming from his penis both on the outside of the catheter and draining with urine into catheter.  Per the protocol, I instructed that he be seen in office, now.  An appointment was made for 3:10 p.m. today in [place].    [facility] to see if they can see [facility] today. If he can get in to see them he will call and [facility] [place] [facility]      Reason for Disposition    Bleeding around catheter (e.g., from penis or female urethra)    Additional Information    Negative: Shock suspected (e.g., cold/pale/clammy skin, too weak to stand, low BP, rapid pulse)    Negative: Sounds like a life-threatening emergency to the triager    Negative: Catheter was accidentally pulled-out and bright red continuous bleeding    Negative: SEVERE abdominal pain    Negative: Fever > 100.4 F (38.0 C)    Negative: Drinking very little and dehydration suspected (e.g., no urine > 12 hours, very dry mouth, very lightheaded)    Negative: Patient sounds very sick or weak to the triager    Negative: Catheter was accidentally pulled-out    Protocols used: URINARY CATHETER SYMPTOMS AND [id number] YOO RN [facility]

## 2022-02-04 NOTE — TELEPHONE ENCOUNTER
Patient presents to the Clinic     Patient reported needing to go to a Urology appointment today and was confused on directions and where to go   Patient had a 1510 appointment with Urology in [place]     Patient reported having blood in his urine   Patient reports he had a catheter placed in the ED on 2/1/2022 for urinary retention   States 2 days ago patient noticed blood in the drainage bag with some clots - also reported some blood at the insertion site    Assessed patient's catheter  Clear yellow urine noted in drainage bag - no blood noted in bag or tubing   Emptied leg bag for 1,000mls     Patient reports the blood at the insertion site is very minimal   Denies pain   Reports he cleanses the insertion site BID with soap and water   Uses good technique for emptying and changing drainage bags     Reviewed red flag symptoms that would warrant ED evaluation over the weekend   Will forward message to Urology to review and assist with re-scheduling appointment     Eusebio Joseph RN     needed assist

## 2022-02-18 ENCOUNTER — OFFICE VISIT (OUTPATIENT)
Dept: CARDIOLOGY | Facility: CLINIC | Age: 87
End: 2022-02-18
Payer: MEDICARE

## 2022-02-18 VITALS
SYSTOLIC BLOOD PRESSURE: 112 MMHG | RESPIRATION RATE: 16 BRPM | BODY MASS INDEX: 27.95 KG/M2 | WEIGHT: 192 LBS | DIASTOLIC BLOOD PRESSURE: 60 MMHG | HEART RATE: 62 BPM

## 2022-02-18 DIAGNOSIS — E78.5 DYSLIPIDEMIA: ICD-10-CM

## 2022-02-18 DIAGNOSIS — I10 HYPERTENSION, UNSPECIFIED TYPE: ICD-10-CM

## 2022-02-18 DIAGNOSIS — I25.10 CORONARY ARTERY DISEASE INVOLVING NATIVE CORONARY ARTERY WITHOUT ANGINA PECTORIS, UNSPECIFIED WHETHER NATIVE OR TRANSPLANTED HEART: Primary | ICD-10-CM

## 2022-02-18 PROCEDURE — 99214 OFFICE O/P EST MOD 30 MIN: CPT | Performed by: INTERNAL MEDICINE

## 2022-02-18 RX ORDER — CARBOXYMETHYLCELLULOSE SODIUM 5 MG/ML
SOLUTION/ DROPS OPHTHALMIC PRN
COMMUNITY
Start: 2021-04-26 | End: 2022-04-27

## 2022-02-18 RX ORDER — DIMENHYDRINATE 50 MG
TABLET ORAL DAILY
COMMUNITY

## 2022-02-18 NOTE — LETTER
2/18/2022      RE: Familia Sales  [address] [facility] [address]              [facility] [address], [place] [address]   [email or web address]   OFFICE: [phone]          Thank you Josefa Martinez for asking the [facility] team to participate in the care of your patient, Familia Sales.     Impression and Plan     1. Coronary disease. Familia has known coronary artery disease. Specifically, he had a history of having had a drug-eluting stent placement to the LAD in February 2014. He had this performed initially in [place]. He was noted to have at that time a significant right coronary artery stenosis and was being considered for stage intervention, but developed progressive chest pain symptoms. He ultimately underwent drug-eluting stent placement (3.0 x 16 mm) to the right coronary artery and was dismissed home 1 May 2014 post PCI.    Regadenoson nuclear perfusion study 22 August 2019 revealed no evidence of infarct or ischemia.  ?  This is stable. Patient reports no chest pain or other concerning symptoms.  ?  2. Hypertension.  Blood pressure is very reasonable in the office today at 112/60 mmHg  ?  3. Dyslipidemia. Patient has been intolerant of statin therapy in the past.  As noted in previous communications, we have also have discussed possible trial of ezetimibe or other newer agents though the patient is reticent to pursue this.      Plan on follow-up in one year.    30 minutes spent reviewing prior records (including documentation, laboratory studies, cardiac testing/imaging), interview with patient along with physical exam, planning, and subsequent documentation/crafting of note.           History of Present Illness    Once again I would like to thank you again for asking me to participate in the care of your patient, Familia Sales.  As you know, but to reiterate for my own records, Familia Sales is a 89 year old male with history of  having had a drug-eluting stent placement to the LAD in February 2014. He had this performed initially in [place]. He was noted to have at that time a significant right coronary artery stenosis and was being considered for stage intervention, but developed progressive chest pain symptoms. He ultimately underwent drug-eluting stent placement (3.0 x 16 mm) to the right coronary artery and was dismissed home 1 May 2014 post PCI.  ?  On interview today, Familia states that he has been doing well from a cardiovascular standpoint. He specifically denies chest pain symptoms. He reports no shortness of breath, palpitations, or subjective decline in exercise tolerance other than that related to some orthopedic issues.    He reports no fevers, chills, or other constitutional symptoms.    Further review of systems is otherwise negative/noncontributory (medical record and 13 point review of systems reviewed as well and pertinent positives noted).         Cardiac Diagnostics      Echocardiogram 15 November 2019:  1. Normal left ventricular size and systolic performance with ejection fraction of 55 to 60%.  2. No significant valvular heart disease.  3. Normal right ventricular size and systolic performance.  4. Mild biatrial enlargement.    Echocardiogram performed 29 April 2014:  1. Normal left ventricular size and systolic performance with ejection fraction of 60%.  2. No wall motion abnormalities.  3. Mild concentric increased LV wall thickness.  4. No significant valvular heart disease.  5. Moderate left atrial enlargement.    Regadenoson nuclear perfusion study 22 August 2019:  1. No evidence of infarct or ischemia.  2. Normal left ventricular systolic performance with ejection fraction of 69%.    Regadenoson nuclear perfusion study 15 May 2015:  1. Subjectively and objectively negative regadenoson infusion.  2. Regadenoson nuclear imaging does not suggest any ischemia or prior scar.  3. Preserved wall motion as mentioned above  without any obvious wall motion abnormalities.    Coronary angiogram 30 April 2014:  1. Right dominant coronary arterial system.  2. Previous stent in proximal LAD exhibited no restenosis.  3. First diagonal coronary artery exhibited moderate-severe disease in the proximal segment, however, vessel arises from within stent segment of proximal LAD.  4. PCI directed to right coronary artery stenosis (3.0×16 mm Promise Premier drug-eluting stent)     12-lead ECG (personally reviewed) 21 July 2014 revealed sinus rhythm with evidence of prior inferior infarct. ECG is unchanged from one May 2014.         Physical Examination       /60 (BP Location: Left arm, Patient Position: Sitting, Cuff Size: Adult Large)   Pulse 62   Resp 16   Wt 87.1 kg (192 lb)   BMI 27.95 kg/m          Wt Readings from Last 3 Encounters:   02/18/22 87.1 kg (192 lb)   02/01/22 86.2 kg (190 lb)   03/19/21 90.7 kg (200 lb)       The patient is alert and oriented times three. Sclerae are anicteric. Mucosal membranes are moist. Jugular venous pressure is normal. No significant adenopathy/thyromegally appreciated. Lungs are clear with good expansion. On cardiovascular exam, the patient has a regular S1 and S2. Abdomen is soft and non-tender. Extremities reveal no clubbing, cyanosis, or edema.         Medications  Allergies   Current Outpatient Medications   Medication Sig Dispense Refill     acetaminophen (TYLENOL) 500 MG tablet Take 500-1,000 mg by mouth every 6 hours as needed for mild pain       amoxicillin (AMOXIL) 500 MG capsule Take 500 mg by mouth Take four capsules 1 hour before dental procedures.       aspirin 81 MG EC tablet Take 81 mg by mouth daily       carboxymethylcellulose (REFRESH PLUS) 0.5 % SOLN ophthalmic solution as needed       celecoxib (CELEBREX) 100 MG capsule Take 100 mg by mouth 2 times daily as needed for pain       [employer]S GLUCOSAMINE-CHONDROIT-MSM PO 1 tablet daily       Flaxseed, Linseed, (FLAX SEED OIL) 1000 MG  capsule Take by mouth daily       GLUCOSAMINE SULFATE PO Take 200 mg by mouth daily        hydrocortisone 2.5 % cream Apply topically 2 times daily       ketoconazole (NIZORAL) 2 % external shampoo Shampoo scalp 3 times weekly       lidocaine (LIDODERM) 5 % patch Place 2 patches onto the skin every 24 hours To prevent lidocaine toxicity, patient should be patch free for 12 hrs daily.       nitroGLYcerin (NITROSTAT) 0.4 MG sublingual tablet Place 0.4 mg under the tongue every 5 minutes as needed for chest pain For chest pain place 1 tablet under the tongue every 5 minutes for 3 doses. If symptoms persist 5 minutes after 1st dose call 911.       sertraline (ZOLOFT) 50 MG tablet Take 50 mg by mouth daily        Ubiquinol 200 MG CAPS Take 1 capsule by mouth daily       ferrous sulfate (FE TABS) 325 (65 Fe) MG EC tablet Take 325 mg by mouth daily       olopatadine (PATANOL) 0.1 % ophthalmic solution Place 1 drop into both eyes 2 times daily prn         Allergies   Allergen Reactions     Codeine Itching     Duloxetine      Dye [Contrast Dye]      ONE REACTION TO INJECTED DYE IN SHOULDER, ABOUT MAY 1965.     Morphine      Motrin [Ibuprofen Micronized] Itching     Tagamet Itching     Vicodin [Hydrocodone-Acetaminophen]      EXTREME SWEATING, FLUSHING AND LIGHT-HEADEDNESS.       Hay Fever & [A.R.M.]           Lab Results    Chemistry/lipid CBC Cardiac Enzymes/BNP/TSH/INR   Recent Labs   Lab Test 11/07/16  0915   CHOL 219*   HDL 49   *   TRIG 121     Recent Labs   Lab Test 11/07/16  0915 11/12/15  0853 08/04/15  0825   * 137* 118     Recent Labs   Lab Test 02/01/22  1122      POTASSIUM 3.8   CHLORIDE 110*   CO2 27   *   BUN 18   CR 0.83   GFRESTIMATED 83   VINAY 10.1     Recent Labs   Lab Test 02/01/22  1122 03/19/21  1645 07/22/20  1609   CR 0.83 0.76 0.76     Recent Labs   Lab Test 04/29/14  0244   A1C 6.0          Recent Labs   Lab Test 02/01/22  1122   WBC 9.8   HGB 14.7   HCT 43.2   MCV 90   PLT  180     Recent Labs   Lab Test 02/01/22  1122 03/19/21  1645 07/22/20  1609   HGB 14.7 15.0 14.9    Recent Labs   Lab Test 11/14/19  1657 01/05/18  1130   TROPONINI 0.05 0.02     No results for input(s): BNP, NTBNPI, NTBNP in the last 80603 hours.  Recent Labs   Lab Test 03/19/21  1645   TSH 1.13     Recent Labs   Lab Test 11/14/19  1657   INR 1.10        Medical History  Surgical History Family History Social History   No past medical history on file.  Past Surgical History:   Procedure Laterality Date     APPENDECTOMY  1950     CORONARY STENT PLACEMENT  4/30/2014     EYE SURGERY       FOOT SURGERY       HC DEST LOC LES CHOROID, PHOTOCOAG >=1 SESSION  12/13/06    LASIK     HERNIA REPAIR       HERNIA REPAIR, UMBILICAL  09/19/06     OTHER SURGICAL HISTORY      decompression l3     OTHER SURGICAL HISTORY      spinal fusion L4-L5     RELEASE CARPAL TUNNEL Right 10/29/2014     REPLACEMENT TOTAL KNEE       SURGICAL HISTORY OF -   APRIL 1959    CARTILAGE REMOVED FROM NOSE     SURGICAL HISTORY OF -   08/27/2008    LUMBAR DECOMPRESSION AT L2-3 AND L3-4     SURGICAL HISTORY OF -   1/20/09 THRU 3/06/09    CORTIZONE INJECTIOSN A SERIES OF 8     SURGICAL HISTORY OF -   8/09    INJECTION L3 NERVE ENDING     ZZ ANESTH,LUMBAR SPINE,CORD SURGERY  10/28/97 & 09/21/98    L3 to sacrum with spinal fusion L4-L% and decompression L5-S1     ZZC ANESTH,TOTAL KNEE ARTHROPLASTY  04/16/03    with revision left 08/02/04     ZZC APPENDECTOMY  MAY 1949 OR 1950     Z FOOT/TOES SURGERY PROC UNLISTED  MARCH 1989     Family History   Problem Relation Age of Onset     Gastrointestinal Disease Mother         ulcers     Cancer Mother      Cancer Maternal Grandfather      Depression Daughter      Liver Disease Daughter      Diabetes Daughter      Cancer Father         Social History     Socioeconomic History     Marital status:      Spouse name: Not on file     Number of children: Not on file     Years of education: Not on file     Highest  education level: Not on file   Occupational History     Not on file   Tobacco Use     Smoking status: Former Smoker     Packs/day: 1.00     Years: 24.00     Pack years: 24.00     Types: Cigarettes     Quit date: 1964     Years since quittin.1     Smokeless tobacco: Never Used   Substance and Sexual Activity     Alcohol use: Yes     Comment: very rare     Drug use: No     Sexual activity: Not Currently     Partners: Female   Other Topics Concern     Parent/sibling w/ CABG, MI or angioplasty before 65F 55M? Not Asked   Social History Narrative     Not on file     Social Determinants of Health     Financial Resource Strain: Not on file   Food Insecurity: Not on file   Transportation Needs: Not on file   Physical Activity: Not on file   Stress: Not on file   Social Connections: Not on file   Intimate Partner Violence: Not on file   Housing Stability: Not on file

## 2022-02-18 NOTE — LETTER
2/18/2022    Josefa Saleh  [facility] [address] [place] [address]    RE: Familia Sales       Dear Colleague,     I had the pleasure of seeing Familia Sales in the [facility].         [facility] [address], [place] [address]   [email or web address]   OFFICE: [phone]          Thank you Josefa Martinez for asking the [facility] team to participate in the care of your patient, Familia Sales.     Impression and Plan     1. Coronary disease. Familia has known coronary artery disease. Specifically, he had a history of having had a drug-eluting stent placement to the LAD in February 2014. He had this performed initially in [place]. He was noted to have at that time a significant right coronary artery stenosis and was being considered for stage intervention, but developed progressive chest pain symptoms. He ultimately underwent drug-eluting stent placement (3.0 x 16 mm) to the right coronary artery and was dismissed home 1 May 2014 post PCI.    Regadenoson nuclear perfusion study 22 August 2019 revealed no evidence of infarct or ischemia.  ?  This is stable. Patient reports no chest pain or other concerning symptoms.  ?  2. Hypertension.  Blood pressure is very reasonable in the office today at 112/60 mmHg  ?  3. Dyslipidemia. Patient has been intolerant of statin therapy in the past.  As noted in previous communications, we have also have discussed possible trial of ezetimibe or other newer agents though the patient is reticent to pursue this.      Plan on follow-up in one year.    30 minutes spent reviewing prior records (including documentation, laboratory studies, cardiac testing/imaging), interview with patient along with physical exam, planning, and subsequent documentation/crafting of note.           History of Present Illness    Once again I would like to thank you again for asking me to participate in the care of  your patient, Familia Sales.  As you know, but to reiterate for my own records, Familia Sales is a 89 year old male with history of having had a drug-eluting stent placement to the LAD in February 2014. He had this performed initially in [place]. He was noted to have at that time a significant right coronary artery stenosis and was being considered for stage intervention, but developed progressive chest pain symptoms. He ultimately underwent drug-eluting stent placement (3.0 x 16 mm) to the right coronary artery and was dismissed home 1 May 2014 post PCI.  ?  On interview today, Familia states that he has been doing well from a cardiovascular standpoint. He specifically denies chest pain symptoms. He reports no shortness of breath, palpitations, or subjective decline in exercise tolerance other than that related to some orthopedic issues.    He reports no fevers, chills, or other constitutional symptoms.    Further review of systems is otherwise negative/noncontributory (medical record and 13 point review of systems reviewed as well and pertinent positives noted).         Cardiac Diagnostics      Echocardiogram 15 November 2019:  1. Normal left ventricular size and systolic performance with ejection fraction of 55 to 60%.  2. No significant valvular heart disease.  3. Normal right ventricular size and systolic performance.  4. Mild biatrial enlargement.    Echocardiogram performed 29 April 2014:  1. Normal left ventricular size and systolic performance with ejection fraction of 60%.  2. No wall motion abnormalities.  3. Mild concentric increased LV wall thickness.  4. No significant valvular heart disease.  5. Moderate left atrial enlargement.    Regadenoson nuclear perfusion study 22 August 2019:  1. No evidence of infarct or ischemia.  2. Normal left ventricular systolic performance with ejection fraction of 69%.    Regadenoson nuclear perfusion study 15 May 2015:  1. Subjectively and objectively negative regadenoson  infusion.  2. Regadenoson nuclear imaging does not suggest any ischemia or prior scar.  3. Preserved wall motion as mentioned above without any obvious wall motion abnormalities.    Coronary angiogram 30 April 2014:  1. Right dominant coronary arterial system.  2. Previous stent in proximal LAD exhibited no restenosis.  3. First diagonal coronary artery exhibited moderate-severe disease in the proximal segment, however, vessel arises from within stent segment of proximal LAD.  4. PCI directed to right coronary artery stenosis (3.0×16 mm Promise Premier drug-eluting stent)     12-lead ECG (personally reviewed) 21 July 2014 revealed sinus rhythm with evidence of prior inferior infarct. ECG is unchanged from one May 2014.         Physical Examination       /60 (BP Location: Left arm, Patient Position: Sitting, Cuff Size: Adult Large)   Pulse 62   Resp 16   Wt 87.1 kg (192 lb)   BMI 27.95 kg/m          Wt Readings from Last 3 Encounters:   02/18/22 87.1 kg (192 lb)   02/01/22 86.2 kg (190 lb)   03/19/21 90.7 kg (200 lb)       The patient is alert and oriented times three. Sclerae are anicteric. Mucosal membranes are moist. Jugular venous pressure is normal. No significant adenopathy/thyromegally appreciated. Lungs are clear with good expansion. On cardiovascular exam, the patient has a regular S1 and S2. Abdomen is soft and non-tender. Extremities reveal no clubbing, cyanosis, or edema.         Medications  Allergies   Current Outpatient Medications   Medication Sig Dispense Refill     acetaminophen (TYLENOL) 500 MG tablet Take 500-1,000 mg by mouth every 6 hours as needed for mild pain       amoxicillin (AMOXIL) 500 MG capsule Take 500 mg by mouth Take four capsules 1 hour before dental procedures.       aspirin 81 MG EC tablet Take 81 mg by mouth daily       carboxymethylcellulose (REFRESH PLUS) 0.5 % SOLN ophthalmic solution as needed       celecoxib (CELEBREX) 100 MG capsule Take 100 mg by mouth 2 times  daily as needed for pain       CVS GLUCOSAMINE-CHONDROIT-MSM PO 1 tablet daily       Flaxseed, Linseed, (FLAX SEED OIL) 1000 MG capsule Take by mouth daily       GLUCOSAMINE SULFATE PO Take 200 mg by mouth daily        hydrocortisone 2.5 % cream Apply topically 2 times daily       ketoconazole (NIZORAL) 2 % external shampoo Shampoo scalp 3 times weekly       lidocaine (LIDODERM) 5 % patch Place 2 patches onto the skin every 24 hours To prevent lidocaine toxicity, patient should be patch free for 12 hrs daily.       nitroGLYcerin (NITROSTAT) 0.4 MG sublingual tablet Place 0.4 mg under the tongue every 5 minutes as needed for chest pain For chest pain place 1 tablet under the tongue every 5 minutes for 3 doses. If symptoms persist 5 minutes after 1st dose call 911.       sertraline (ZOLOFT) 50 MG tablet Take 50 mg by mouth daily        Ubiquinol 200 MG CAPS Take 1 capsule by mouth daily       ferrous sulfate (FE TABS) 325 (65 Fe) MG EC tablet Take 325 mg by mouth daily       olopatadine (PATANOL) 0.1 % ophthalmic solution Place 1 drop into both eyes 2 times daily prn         Allergies   Allergen Reactions     Codeine Itching     Duloxetine      Dye [Contrast Dye]      ONE REACTION TO INJECTED DYE IN SHOULDER, ABOUT MAY 1965.     Morphine      Motrin [Ibuprofen Micronized] Itching     Tagamet Itching     Vicodin [Hydrocodone-Acetaminophen]      EXTREME SWEATING, FLUSHING AND LIGHT-HEADEDNESS.       Hay Fever & [A.R.M.]           Lab Results    Chemistry/lipid CBC Cardiac Enzymes/BNP/TSH/INR   Recent Labs   Lab Test 11/07/16  0915   CHOL 219*   HDL 49   *   TRIG 121     Recent Labs   Lab Test 11/07/16  0915 11/12/15  0853 08/04/15  0825   * 137* 118     Recent Labs   Lab Test 02/01/22  1122      POTASSIUM 3.8   CHLORIDE 110*   CO2 27   *   BUN 18   CR 0.83   GFRESTIMATED 83   VINAY 10.1     Recent Labs   Lab Test 02/01/22  1122 03/19/21  1645 07/22/20  1609   CR 0.83 0.76 0.76     Recent Labs    Lab Test 04/29/14  0244   A1C 6.0          Recent Labs   Lab Test 02/01/22  1122   WBC 9.8   HGB 14.7   HCT 43.2   MCV 90        Recent Labs   Lab Test 02/01/22  1122 03/19/21  1645 07/22/20  1609   HGB 14.7 15.0 14.9    Recent Labs   Lab Test 11/14/19  1657 01/05/18  1130   TROPONINI 0.05 0.02     No results for input(s): BNP, NTBNPI, NTBNP in the last 64306 hours.  Recent Labs   Lab Test 03/19/21  1645   TSH 1.13     Recent Labs   Lab Test 11/14/19  1657   INR 1.10        Medical History  Surgical History Family History Social History   No past medical history on file.  Past Surgical History:   Procedure Laterality Date     APPENDECTOMY  1950     CORONARY STENT PLACEMENT  4/30/2014     EYE SURGERY       FOOT SURGERY       HC DEST LOC LES CHOROID, PHOTOCOAG >=1 SESSION  12/13/06    LASIK     HERNIA REPAIR       HERNIA REPAIR, UMBILICAL  09/19/06     OTHER SURGICAL HISTORY      decompression l3     OTHER SURGICAL HISTORY      spinal fusion L4-L5     RELEASE CARPAL TUNNEL Right 10/29/2014     REPLACEMENT TOTAL KNEE       SURGICAL HISTORY OF -   APRIL 1959    CARTILAGE REMOVED FROM NOSE     SURGICAL HISTORY OF -   08/27/2008    LUMBAR DECOMPRESSION AT L2-3 AND L3-4     SURGICAL HISTORY OF -   1/20/09 THRU 3/06/09    CORTIZONE INJECTIOSN A SERIES OF 8     SURGICAL HISTORY OF -   8/09    INJECTION L3 NERVE ENDING     [facility] ANESTH,LUMBAR SPINE,CORD SURGERY  10/28/97 & 09/21/98    L3 to sacrum with spinal fusion L4-L% and decompression L5-S1     Z ANESTH,TOTAL KNEE ARTHROPLASTY  04/16/03    with revision left 08/02/04     Z APPENDECTOMY  MAY 1949 OR 1950     Z FOOT/TOES SURGERY PROC UNLISTED  MARCH 1989     Family History   Problem Relation Age of Onset     Gastrointestinal Disease Mother         ulcers     Cancer Mother      Cancer Maternal Grandfather      Depression Daughter      Liver Disease Daughter      Diabetes Daughter      Cancer Father         Social History     Socioeconomic History     Marital  status:      Spouse name: Not on file     Number of children: Not on file     Years of education: Not on file     Highest education level: Not on file   Occupational History     Not on file   Tobacco Use     Smoking status: Former Smoker     Packs/day: 1.00     Years: 24.00     Pack years: 24.00     Types: Cigarettes     Quit date: 1964     Years since quittin.1     Smokeless tobacco: Never Used   Substance and Sexual Activity     Alcohol use: Yes     Comment: very rare     Drug use: No     Sexual activity: Not Currently     Partners: Female   Other Topics Concern     Parent/sibling w/ CABG, MI or angioplasty before 65F 55M? Not Asked   Social History Narrative     Not on file     Social Determinants of Health     Financial Resource Strain: Not on file   Food Insecurity: Not on file   Transportation Needs: Not on file   Physical Activity: Not on file   Stress: Not on file   Social Connections: Not on file   Intimate Partner Violence: Not on file   Housing Stability: Not on file                      Thank you for allowing me to participate in the care of your patient.      Sincerely,     Henrik Breaux MD     [facility]  cc:   No referring provider defined for this encounter.

## 2022-02-18 NOTE — PROGRESS NOTES
[facility] [address], [place] [address]   [email or web address]   OFFICE: [phone]          Thank you Josefa Martinez for asking the [facility] team to participate in the care of your patient, Familia Sales.     Impression and Plan     1. Coronary disease. Familia has known coronary artery disease. Specifically, he had a history of having had a drug-eluting stent placement to the LAD in February 2014. He had this performed initially in [place]. He was noted to have at that time a significant right coronary artery stenosis and was being considered for stage intervention, but developed progressive chest pain symptoms. He ultimately underwent drug-eluting stent placement (3.0 x 16 mm) to the right coronary artery and was dismissed home 1 May 2014 post PCI.    Regadenoson nuclear perfusion study 22 August 2019 revealed no evidence of infarct or ischemia.  ?  This is stable. Patient reports no chest pain or other concerning symptoms.  ?  2. Hypertension.  Blood pressure is very reasonable in the office today at 112/60 mmHg  ?  3. Dyslipidemia. Patient has been intolerant of statin therapy in the past.  As noted in previous communications, we have also have discussed possible trial of ezetimibe or other newer agents though the patient is reticent to pursue this.      Plan on follow-up in one year.    30 minutes spent reviewing prior records (including documentation, laboratory studies, cardiac testing/imaging), interview with patient along with physical exam, planning, and subsequent documentation/crafting of note.           History of Present Illness    Once again I would like to thank you again for asking me to participate in the care of your patient, Familia Sales.  As you know, but to reiterate for my own records, Familia Sales is a 89 year old male with history of having had a drug-eluting stent placement to the LAD in February 2014. He had this  performed initially in [place]. He was noted to have at that time a significant right coronary artery stenosis and was being considered for stage intervention, but developed progressive chest pain symptoms. He ultimately underwent drug-eluting stent placement (3.0 x 16 mm) to the right coronary artery and was dismissed home 1 May 2014 post PCI.  ?  On interview today, Familia states that he has been doing well from a cardiovascular standpoint. He specifically denies chest pain symptoms. He reports no shortness of breath, palpitations, or subjective decline in exercise tolerance other than that related to some orthopedic issues.    He reports no fevers, chills, or other constitutional symptoms.    Further review of systems is otherwise negative/noncontributory (medical record and 13 point review of systems reviewed as well and pertinent positives noted).         Cardiac Diagnostics      Echocardiogram 15 November 2019:  1. Normal left ventricular size and systolic performance with ejection fraction of 55 to 60%.  2. No significant valvular heart disease.  3. Normal right ventricular size and systolic performance.  4. Mild biatrial enlargement.    Echocardiogram performed 29 April 2014:  1. Normal left ventricular size and systolic performance with ejection fraction of 60%.  2. No wall motion abnormalities.  3. Mild concentric increased LV wall thickness.  4. No significant valvular heart disease.  5. Moderate left atrial enlargement.    Regadenoson nuclear perfusion study 22 August 2019:  1. No evidence of infarct or ischemia.  2. Normal left ventricular systolic performance with ejection fraction of 69%.    Regadenoson nuclear perfusion study 15 May 2015:  1. Subjectively and objectively negative regadenoson infusion.  2. Regadenoson nuclear imaging does not suggest any ischemia or prior scar.  3. Preserved wall motion as mentioned above without any obvious wall motion abnormalities.    Coronary angiogram 30 April  2014:  1. Right dominant coronary arterial system.  2. Previous stent in proximal LAD exhibited no restenosis.  3. First diagonal coronary artery exhibited moderate-severe disease in the proximal segment, however, vessel arises from within stent segment of proximal LAD.  4. PCI directed to right coronary artery stenosis (3.0×16 mm Promise Premier drug-eluting stent)     12-lead ECG (personally reviewed) 21 July 2014 revealed sinus rhythm with evidence of prior inferior infarct. ECG is unchanged from one May 2014.         Physical Examination       /60 (BP Location: Left arm, Patient Position: Sitting, Cuff Size: Adult Large)   Pulse 62   Resp 16   Wt 87.1 kg (192 lb)   BMI 27.95 kg/m          Wt Readings from Last 3 Encounters:   02/18/22 87.1 kg (192 lb)   02/01/22 86.2 kg (190 lb)   03/19/21 90.7 kg (200 lb)       The patient is alert and oriented times three. Sclerae are anicteric. Mucosal membranes are moist. Jugular venous pressure is normal. No significant adenopathy/thyromegally appreciated. Lungs are clear with good expansion. On cardiovascular exam, the patient has a regular S1 and S2. Abdomen is soft and non-tender. Extremities reveal no clubbing, cyanosis, or edema.         Medications  Allergies   Current Outpatient Medications   Medication Sig Dispense Refill     acetaminophen (TYLENOL) 500 MG tablet Take 500-1,000 mg by mouth every 6 hours as needed for mild pain       amoxicillin (AMOXIL) 500 MG capsule Take 500 mg by mouth Take four capsules 1 hour before dental procedures.       aspirin 81 MG EC tablet Take 81 mg by mouth daily       carboxymethylcellulose (REFRESH PLUS) 0.5 % SOLN ophthalmic solution as needed       celecoxib (CELEBREX) 100 MG capsule Take 100 mg by mouth 2 times daily as needed for pain       [employer] GLUCOSAMINE-CHONDROIT-MSM PO 1 tablet daily       Flaxseed, Linseed, (FLAX SEED OIL) 1000 MG capsule Take by mouth daily       GLUCOSAMINE SULFATE PO Take 200 mg by mouth daily         hydrocortisone 2.5 % cream Apply topically 2 times daily       ketoconazole (NIZORAL) 2 % external shampoo Shampoo scalp 3 times weekly       lidocaine (LIDODERM) 5 % patch Place 2 patches onto the skin every 24 hours To prevent lidocaine toxicity, patient should be patch free for 12 hrs daily.       nitroGLYcerin (NITROSTAT) 0.4 MG sublingual tablet Place 0.4 mg under the tongue every 5 minutes as needed for chest pain For chest pain place 1 tablet under the tongue every 5 minutes for 3 doses. If symptoms persist 5 minutes after 1st dose call 911.       sertraline (ZOLOFT) 50 MG tablet Take 50 mg by mouth daily        Ubiquinol 200 MG CAPS Take 1 capsule by mouth daily       ferrous sulfate (FE TABS) 325 (65 Fe) MG EC tablet Take 325 mg by mouth daily       olopatadine (PATANOL) 0.1 % ophthalmic solution Place 1 drop into both eyes 2 times daily prn         Allergies   Allergen Reactions     Codeine Itching     Duloxetine      Dye [Contrast Dye]      ONE REACTION TO INJECTED DYE IN SHOULDER, ABOUT MAY 1965.     Morphine      Motrin [Ibuprofen Micronized] Itching     Tagamet Itching     Vicodin [Hydrocodone-Acetaminophen]      EXTREME SWEATING, FLUSHING AND LIGHT-HEADEDNESS.       Hay Fever & [A.R.M.]           Lab Results    Chemistry/lipid CBC Cardiac Enzymes/BNP/TSH/INR   Recent Labs   Lab Test 11/07/16  0915   CHOL 219*   HDL 49   *   TRIG 121     Recent Labs   Lab Test 11/07/16  0915 11/12/15  0853 08/04/15  0825   * 137* 118     Recent Labs   Lab Test 02/01/22  1122      POTASSIUM 3.8   CHLORIDE 110*   CO2 27   *   BUN 18   CR 0.83   GFRESTIMATED 83   VINAY 10.1     Recent Labs   Lab Test 02/01/22  1122 03/19/21  1645 07/22/20  1609   CR 0.83 0.76 0.76     Recent Labs   Lab Test 04/29/14  0244   A1C 6.0          Recent Labs   Lab Test 02/01/22  1122   WBC 9.8   HGB 14.7   HCT 43.2   MCV 90        Recent Labs   Lab Test 02/01/22  1122 03/19/21  1645 07/22/20  1609   HGB  14.7 15.0 14.9    Recent Labs   Lab Test 11/14/19  1657 01/05/18  1130   TROPONINI 0.05 0.02     No results for input(s): BNP, NTBNPI, NTBNP in the last 06544 hours.  Recent Labs   Lab Test 03/19/21  1645   TSH 1.13     Recent Labs   Lab Test 11/14/19  1657   INR 1.10        Medical History  Surgical History Family History Social History   No past medical history on file.  Past Surgical History:   Procedure Laterality Date     APPENDECTOMY  1950     CORONARY STENT PLACEMENT  4/30/2014     EYE SURGERY       FOOT SURGERY       HC DEST LOC LES CHOROID, PHOTOCOAG >=1 SESSION  12/13/06    LASIK     HERNIA REPAIR       HERNIA REPAIR, UMBILICAL  09/19/06     OTHER SURGICAL HISTORY      decompression l3     OTHER SURGICAL HISTORY      spinal fusion L4-L5     RELEASE CARPAL TUNNEL Right 10/29/2014     REPLACEMENT TOTAL KNEE       SURGICAL HISTORY OF -   APRIL 1959    CARTILAGE REMOVED FROM NOSE     SURGICAL HISTORY OF -   08/27/2008    LUMBAR DECOMPRESSION AT L2-3 AND L3-4     SURGICAL HISTORY OF -   1/20/09 THRU 3/06/09    CORTIZONE INJECTIOSN A SERIES OF 8     SURGICAL HISTORY OF -   8/09    INJECTION L3 NERVE ENDING     [facility] ANESTH,LUMBAR SPINE,CORD SURGERY  10/28/97 & 09/21/98    L3 to sacrum with spinal fusion L4-L% and decompression L5-S1     Z ANESTH,TOTAL KNEE ARTHROPLASTY  04/16/03    with revision left 08/02/04     [facility] APPENDECTOMY  MAY 1949 OR 1950     [facility] FOOT/TOES SURGERY PROC UNLISTED  MARCH 1989     Family History   Problem Relation Age of Onset     Gastrointestinal Disease Mother         ulcers     Cancer Mother      Cancer Maternal Grandfather      Depression Daughter      Liver Disease Daughter      Diabetes Daughter      Cancer Father         Social History     Socioeconomic History     Marital status:      Spouse name: Not on file     Number of children: Not on file     Years of education: Not on file     Highest education level: Not on file   Occupational History     Not on file   Tobacco Use      Smoking status: Former Smoker     Packs/day: 1.00     Years: 24.00     Pack years: 24.00     Types: Cigarettes     Quit date: 1964     Years since quittin.1     Smokeless tobacco: Never Used   Substance and Sexual Activity     Alcohol use: Yes     Comment: very rare     Drug use: No     Sexual activity: Not Currently     Partners: Female   Other Topics Concern     Parent/sibling w/ CABG, MI or angioplasty before 65F 55M? Not Asked   Social History Narrative     Not on file     Social Determinants of Health     Financial Resource Strain: Not on file   Food Insecurity: Not on file   Transportation Needs: Not on file   Physical Activity: Not on file   Stress: Not on file   Social Connections: Not on file   Intimate Partner Violence: Not on file   Housing Stability: Not on file

## 2022-04-16 ENCOUNTER — HEALTH MAINTENANCE LETTER (OUTPATIENT)
Age: 87
End: 2022-04-16

## 2022-08-06 ENCOUNTER — HEALTH MAINTENANCE LETTER (OUTPATIENT)
Age: 87
End: 2022-08-06

## 2022-10-22 ENCOUNTER — HEALTH MAINTENANCE LETTER (OUTPATIENT)
Age: 87
End: 2022-10-22

## 2022-12-04 ENCOUNTER — HEALTH MAINTENANCE LETTER (OUTPATIENT)
Age: 87
End: 2022-12-04

## 2023-04-01 ENCOUNTER — HEALTH MAINTENANCE LETTER (OUTPATIENT)
Age: 88
End: 2023-04-01

## 2023-08-26 ENCOUNTER — HEALTH MAINTENANCE LETTER (OUTPATIENT)
Age: 88
End: 2023-08-26

## 2023-08-31 ENCOUNTER — OFFICE VISIT (OUTPATIENT)
Dept: CARDIOLOGY | Facility: CLINIC | Age: 88
End: 2023-08-31
Payer: MEDICARE

## 2023-08-31 VITALS
OXYGEN SATURATION: 95 % | BODY MASS INDEX: 26.71 KG/M2 | HEART RATE: 64 BPM | HEIGHT: 70 IN | SYSTOLIC BLOOD PRESSURE: 151 MMHG | DIASTOLIC BLOOD PRESSURE: 72 MMHG | RESPIRATION RATE: 16 BRPM | WEIGHT: 186.6 LBS

## 2023-08-31 DIAGNOSIS — I48.0 PAROXYSMAL ATRIAL FIBRILLATION (H): Primary | ICD-10-CM

## 2023-08-31 DIAGNOSIS — E78.5 DYSLIPIDEMIA: ICD-10-CM

## 2023-08-31 DIAGNOSIS — I10 HYPERTENSION, UNSPECIFIED TYPE: ICD-10-CM

## 2023-08-31 DIAGNOSIS — I25.10 CORONARY ARTERY DISEASE INVOLVING NATIVE CORONARY ARTERY OF NATIVE HEART WITHOUT ANGINA PECTORIS: ICD-10-CM

## 2023-08-31 PROCEDURE — 99214 OFFICE O/P EST MOD 30 MIN: CPT | Performed by: INTERNAL MEDICINE

## 2023-08-31 RX ORDER — APIXABAN 5 MG/1
5 TABLET, FILM COATED ORAL 2 TIMES DAILY
COMMUNITY
Start: 2023-08-17 | End: 2023-12-22

## 2023-08-31 RX ORDER — FUROSEMIDE 20 MG
20 TABLET ORAL DAILY
COMMUNITY
Start: 2022-12-16 | End: 2024-05-20

## 2023-08-31 RX ORDER — BETAMETHASONE DIPROPIONATE 0.5 MG/G
CREAM TOPICAL
COMMUNITY
Start: 2022-11-04 | End: 2023-11-05

## 2023-08-31 RX ORDER — CLOTRIMAZOLE 1 %
CREAM (GRAM) TOPICAL
COMMUNITY
Start: 2022-11-04 | End: 2023-11-05

## 2023-08-31 RX ORDER — TAFAMIDIS MEGLUMINE 20 MG/1
1 CAPSULE, LIQUID FILLED ORAL DAILY
COMMUNITY
Start: 2023-08-25

## 2023-08-31 NOTE — LETTER
8/31/2023    Josefa Saleh  [facility] [address]   [place] [address]    RE: Familia Sales       Dear Colleague,     I had the pleasure of seeing Familia Sales in the [facility].         [facility] [address], [place] [address]   [email or web address]   OFFICE: [phone]            Impression and Plan     1. Coronary disease. Familia has known coronary artery disease. Specifically, he had a history of having had a drug-eluting stent placement to the LAD in February 2014. He had this performed initially in [place]. He was noted to have at that time a significant right coronary artery stenosis and was being considered for stage intervention, but developed progressive chest pain symptoms. He ultimately underwent drug-eluting stent placement (3.0 x 16 mm) to the right coronary artery and was dismissed home 1 May 2014 post PCI.     Regadenoson nuclear perfusion study 22 August 2019 revealed no evidence of infarct or ischemia.  ?  This is stable.  Familia reports no chest pain or other concerning symptoms.  ?  2.  Atrial fibrillation.  As noted below, Familia reports that in December 2022 he had presented to the [facility] and was found to have evidence of atrial fibrillation.  He underwent cardioversion at that time and was started on apixaban.  [facility] not [facility].  He does report some intermittent palpitations though rather sporadic.  Plan:  Continue apixaban.  Plan to obtain a 1 month one-patch monitor to see if having other episodes of atrial fibrillation given his palpitations.    3.  Hypertension.  Blood pressure somewhat elevated in the office today though he had gotten lost in getting to the clinic and states he was a little frustrated as a consequence.  Consequently, we will simply continue to monitor.  ?  3. Dyslipidemia. Patient has been intolerant of statin therapy in the  past.  As noted in previous communications, we have also have discussed possible trial of ezetimibe or other newer agents though the patient is reticent to pursue this.      Follow-up and further recommendations pending ambulatory monitor results.       35 minutes spent reviewing prior records (including documentation, laboratory studies, cardiac testing/imaging), interview with patient along with physical exam, planning, and subsequent documentation/crafting of note).           History of Present Illness    Once again I would like to thank you again for asking me to participate in the care of your patient, Familia Sales.  As you know, but to reiterate for my own records, Familia Sales is a 89 year old male with history of having had a drug-eluting stent placement to the LAD in February 2014. He had this performed initially in [place]. He was noted to have at that time a significant right coronary artery stenosis and was being considered for stage intervention, but developed progressive chest pain symptoms. He ultimately underwent drug-eluting stent placement (3.0 x 16 mm) to the right coronary artery and was dismissed home 1 May 2014 post PCI.  ?  On interview today, Familia reports that in December 2022 he had presented to the [facility] and was found to have evidence of atrial fibrillation.  He underwent cardioversion at that time and was started on apixaban.    On interview he reports some intermittent subjective palpitations that are somewhat sporadic.  He has perhaps a couple episodes per week though sometimes will have weeks where he has minimal such symptoms.  He denies chest pain.  Breathing has been comfortable.    Further review of systems is otherwise negative/noncontributory (medical record and 13 point review of systems reviewed as well and pertinent positives noted).         Cardiac Diagnostics         Echocardiogram 15 November 2019:  Normal left ventricular size and systolic performance with  "ejection fraction of 55 to 60%.  No significant valvular heart disease.  Normal right ventricular size and systolic performance.  Mild biatrial enlargement.    Echocardiogram performed 29 April 2014:  Normal left ventricular size and systolic performance with ejection fraction of 60%.  No wall motion abnormalities.  Mild concentric increased LV wall thickness.  No significant valvular heart disease.  Moderate left atrial enlargement.    Regadenoson nuclear perfusion study 22 August 2019:  No evidence of infarct or ischemia.  Normal left ventricular systolic performance with ejection fraction of 69%.    Regadenoson nuclear perfusion study 15 May 2015:  Subjectively and objectively negative regadenoson infusion.  Regadenoson nuclear imaging does not suggest any ischemia or prior scar.  Preserved wall motion as mentioned above without any obvious wall motion abnormalities.    Coronary angiogram 30 April 2014:  Right dominant coronary arterial system.  Previous stent in proximal LAD exhibited no restenosis.  First diagonal coronary artery exhibited moderate-severe disease in the proximal segment, however, vessel arises from within stent segment of proximal LAD.  PCI directed to right coronary artery stenosis (3.0×16 mm Promise Premier drug-eluting stent)    12-lead ECG (personally reviewed) 21 July 2014 revealed sinus rhythm with evidence of prior inferior infarct. ECG is unchanged from one May 2014.         Physical Examination       BP (!) 151/72 (BP Location: Left arm, Patient Position: Sitting, Cuff Size: Adult Regular)   Pulse 64   Resp 16   Ht 1.765 m (5' 9.5\")   Wt 84.6 kg (186 lb 9.6 oz)   SpO2 95%   BMI 27.16 kg/m          Wt Readings from Last 3 Encounters:   08/31/23 84.6 kg (186 lb 9.6 oz)   02/18/22 87.1 kg (192 lb)   02/01/22 86.2 kg (190 lb)       The patient is alert and oriented times three. Sclerae are anicteric. Mucosal membranes are moist. Jugular venous pressure is normal. No significant " adenopathy/thyromegally appreciated. Lungs are clear with good expansion. On cardiovascular exam, the patient has a regular S1 and S2. Abdomen is soft and non-tender. Extremities reveal no clubbing, cyanosis, or edema.       Family History/Social History/Risk Factors   Patient does not smoke.  Family history reviewed, and family history includes Cancer in his father, maternal grandfather, and mother; Depression in his daughter; Diabetes in his daughter; Gastrointestinal Disease in his mother; Liver Disease in his daughter.          Medical History  Surgical History Family History Social History   No past medical history on file.  Past Surgical History:   Procedure Laterality Date    APPENDECTOMY  1950    CORONARY STENT PLACEMENT  4/30/2014    EYE SURGERY      FOOT SURGERY      HC DEST LOC LES CHOROID, PHOTOCOAG >=1 SESSION  12/13/06    LASIK    HERNIA REPAIR      HERNIA REPAIR, UMBILICAL  09/19/06    OTHER SURGICAL HISTORY      decompression l3    OTHER SURGICAL HISTORY      spinal fusion L4-L5    RELEASE CARPAL TUNNEL Right 10/29/2014    REPLACEMENT TOTAL KNEE      SURGICAL HISTORY OF -   APRIL 1959    CARTILAGE REMOVED FROM NOSE    SURGICAL HISTORY OF -   08/27/2008    LUMBAR DECOMPRESSION AT L2-3 AND L3-4    SURGICAL HISTORY OF -   1/20/09 THRU 3/06/09    CORTIZONE INJECTIOSN A SERIES OF 8    SURGICAL HISTORY OF -   8/09    INJECTION L3 NERVE ENDING    Z ANESTH,LUMBAR SPINE,CORD SURGERY  10/28/97 & 09/21/98    L3 to sacrum with spinal fusion L4-L% and decompression L5-S1    ZZC ANESTH,TOTAL KNEE ARTHROPLASTY  04/16/03    with revision left 08/02/04    Z APPENDECTOMY  MAY 1949 OR 1950    Z FOOT/TOES SURGERY PROC UNLISTED  MARCH 1989     Family History   Problem Relation Age of Onset    Gastrointestinal Disease Mother         ulcers    Cancer Mother     Cancer Maternal Grandfather     Depression Daughter     Liver Disease Daughter     Diabetes Daughter     Cancer Father         Social History      Socioeconomic History    Marital status:      Spouse name: Not on file    Number of children: Not on file    Years of education: Not on file    Highest education level: Not on file   Occupational History    Not on file   Tobacco Use    Smoking status: Former     Packs/day: 1.00     Years: 24.00     Pack years: 24.00     Types: Cigarettes     Quit date: 1964     Years since quittin.7    Smokeless tobacco: Never   Substance and Sexual Activity    Alcohol use: Yes     Comment: very rare    Drug use: No    Sexual activity: Not Currently     Partners: Female   Other Topics Concern    Parent/sibling w/ CABG, MI or angioplasty before 65F 55M? Not Asked   Social History Narrative    Not on file     Social Determinants of Health     Financial Resource Strain: Not on file   Food Insecurity: Not on file   Transportation Needs: Not on file   Physical Activity: Not on file   Stress: Not on file   Social Connections: Not on file   Intimate Partner Violence: Not on file   Housing Stability: Not on file           Medications  Allergies   Current Outpatient Medications   Medication Sig Dispense Refill    acetaminophen (TYLENOL) 500 MG tablet Take 500-1,000 mg by mouth every 6 hours as needed for mild pain      aspirin 81 MG EC tablet Take 81 mg by mouth daily      betamethasone dipropionate (DIPROSONE) 0.05 % external cream APPLY THIN LAYER TO AFFECTED AREA TOPICALLY TWICE A DAY (AVOID FACE, GROIN AND ARMPITS) FORFEET      celecoxib (CELEBREX) 100 MG capsule Take 100 mg by mouth 2 times daily as needed for pain      clotrimazole (LOTRIMIN) 1 % external cream APPLY THIN LAYER TOPICALLY TWICE A DAY **EXTERNAL USE ONLY**  FOR FEET MIX WITH  BETAMETHASONE AND APPLY TO FEET FOR 21 DAYS PER DR. MA      [employer] GLUCOSAMINE-CHONDROIT-MSM PO 1 tablet daily      ELIQUIS ANTICOAGULANT 5 MG tablet TAKE ONE TABLET BY MOUTH EVERY 12 HOURS TO PREVENT AND/OR TREAT BLOOD CLOTS      ferrous sulfate (FE TABS) 325 (65 Fe) MG EC  tablet Take 325 mg by mouth daily      Flaxseed, Linseed, (FLAX SEED OIL) 1000 MG capsule Take by mouth daily      furosemide (LASIX) 20 MG tablet TAKE ONE TABLET BY MOUTH EVERY DAY FOR EXCESS FLUID      GLUCOSAMINE SULFATE PO Take 200 mg by mouth daily       hydrocortisone 2.5 % cream Apply topically 2 times daily      ketoconazole (NIZORAL) 2 % external shampoo Shampoo scalp 3 times weekly      lidocaine (LIDODERM) 5 % patch Place 2 patches onto the skin every 24 hours To prevent lidocaine toxicity, patient should be patch free for 12 hrs daily.      nitroGLYcerin (NITROSTAT) 0.4 MG sublingual tablet Place 0.4 mg under the tongue every 5 minutes as needed for chest pain For chest pain place 1 tablet under the tongue every 5 minutes for 3 doses. If symptoms persist 5 minutes after 1st dose call 911.      olopatadine (PATANOL) 0.1 % ophthalmic solution Place 1 drop into both eyes 2 times daily prn      sertraline (ZOLOFT) 50 MG tablet Take 50 mg by mouth daily       Ubiquinol 200 MG CAPS Take 1 capsule by mouth daily      VYNDAQEL 20 MG       amoxicillin (AMOXIL) 500 MG capsule Take 500 mg by mouth Take four capsules 1 hour before dental procedures. (Patient not taking: Reported on 8/31/2023)         Allergies   Allergen Reactions    Codeine     Duloxetine     Dye [Contrast Dye]      ONE REACTION TO INJECTED DYE IN SHOULDER, ABOUT MAY 1965.    Morphine     Morphine And Related Itching    Motrin [Ibuprofen Micronized] Itching    Tagamet Itching    Vicodin [Hydrocodone-Acetaminophen]      EXTREME SWEATING, FLUSHING AND LIGHT-HEADEDNESS.      Hay Fever & [A.R.M.]           Lab Results    Chemistry/lipid CBC Cardiac Enzymes/BNP/TSH/INR   Recent Labs   Lab Test 11/07/16  0915   CHOL 219*   HDL 49   *   TRIG 121     Recent Labs   Lab Test 11/07/16  0915 11/12/15  0853 08/04/15  0825   * 137* 118     Recent Labs   Lab Test 02/01/22  1122      POTASSIUM 3.8   CHLORIDE 110*   CO2 27   *   BUN 18    CR 0.83   GFRESTIMATED 83   VINAY 10.1     Recent Labs   Lab Test 02/01/22  1122 03/19/21  1645 07/22/20  1609   CR 0.83 0.76 0.76     No results for input(s): A1C in the last 80958 hours.       Recent Labs   Lab Test 02/01/22  1122   WBC 9.8   HGB 14.7   HCT 43.2   MCV 90        Recent Labs   Lab Test 02/01/22  1122 03/19/21  1645 07/22/20  1609   HGB 14.7 15.0 14.9    Recent Labs   Lab Test 11/14/19  1657 01/05/18  1130   TROPONINI 0.05 0.02     No results for input(s): BNP, NTBNPI, NTBNP in the last 02944 hours.  Recent Labs   Lab Test 03/19/21  1645   TSH 1.13     Recent Labs   Lab Test 11/14/19  1657   INR 1.10          Medications  Allergies   Current Outpatient Medications   Medication Sig Dispense Refill    acetaminophen (TYLENOL) 500 MG tablet Take 500-1,000 mg by mouth every 6 hours as needed for mild pain      aspirin 81 MG EC tablet Take 81 mg by mouth daily      betamethasone dipropionate (DIPROSONE) 0.05 % external cream APPLY THIN LAYER TO AFFECTED AREA TOPICALLY TWICE A DAY (AVOID FACE, GROIN AND ARMPITS) FORFEET      celecoxib (CELEBREX) 100 MG capsule Take 100 mg by mouth 2 times daily as needed for pain      clotrimazole (LOTRIMIN) 1 % external cream APPLY THIN LAYER TOPICALLY TWICE A DAY **EXTERNAL USE ONLY**  FOR FEET MIX WITH  BETAMETHASONE AND APPLY TO FEET FOR 21 DAYS PER DR. MA      [facility] GLUCOSAMINE-CHONDROIT-MSM PO 1 tablet daily      ELIQUIS ANTICOAGULANT 5 MG tablet TAKE ONE TABLET BY MOUTH EVERY 12 HOURS TO PREVENT AND/OR TREAT BLOOD CLOTS      ferrous sulfate (FE TABS) 325 (65 Fe) MG EC tablet Take 325 mg by mouth daily      Flaxseed, Linseed, (FLAX SEED OIL) 1000 MG capsule Take by mouth daily      furosemide (LASIX) 20 MG tablet TAKE ONE TABLET BY MOUTH EVERY DAY FOR EXCESS FLUID      GLUCOSAMINE SULFATE PO Take 200 mg by mouth daily       hydrocortisone 2.5 % cream Apply topically 2 times daily      ketoconazole (NIZORAL) 2 % external shampoo Shampoo scalp 3 times weekly       lidocaine (LIDODERM) 5 % patch Place 2 patches onto the skin every 24 hours To prevent lidocaine toxicity, patient should be patch free for 12 hrs daily.      nitroGLYcerin (NITROSTAT) 0.4 MG sublingual tablet Place 0.4 mg under the tongue every 5 minutes as needed for chest pain For chest pain place 1 tablet under the tongue every 5 minutes for 3 doses. If symptoms persist 5 minutes after 1st dose call 911.      olopatadine (PATANOL) 0.1 % ophthalmic solution Place 1 drop into both eyes 2 times daily prn      sertraline (ZOLOFT) 50 MG tablet Take 50 mg by mouth daily       Ubiquinol 200 MG CAPS Take 1 capsule by mouth daily      VYNDAQEL 20 MG       amoxicillin (AMOXIL) 500 MG capsule Take 500 mg by mouth Take four capsules 1 hour before dental procedures. (Patient not taking: Reported on 8/31/2023)        Allergies   Allergen Reactions    Codeine     Duloxetine     Dye [Contrast Dye]      ONE REACTION TO INJECTED DYE IN SHOULDER, ABOUT MAY 1965.    Morphine     Morphine And Related Itching    Motrin [Ibuprofen Micronized] Itching    Tagamet Itching    Vicodin [Hydrocodone-Acetaminophen]      EXTREME SWEATING, FLUSHING AND LIGHT-HEADEDNESS.      Hay Fever & [A.R.M.]           Lab Results   Lab Results   Component Value Date     02/01/2022     03/19/2021    CO2 27 02/01/2022    CO2 24 03/19/2021    BUN 18 02/01/2022    BUN 22 03/19/2021     Lab Results   Component Value Date    WBC 9.8 02/01/2022    WBC 6.9 03/19/2021    HGB 14.7 02/01/2022    HGB 15.0 03/19/2021    HCT 43.2 02/01/2022    HCT 44.4 03/19/2021    MCV 90 02/01/2022    MCV 90 03/19/2021     02/01/2022     03/19/2021     Lab Results   Component Value Date    CHOL 219 11/07/2016    TRIG 121 11/07/2016    HDL 49 11/07/2016     Lab Results   Component Value Date    INR 1.10 11/14/2019     Lab Results   Component Value Date    TROPONINI 0.05 11/14/2019    TROPONINI 0.02 01/05/2018     Lab Results   Component Value Date    TSH  1.13 03/19/2021                  Thank you for allowing me to participate in the care of your patient.      Sincerely,     Henrik Breaux MD     [facility]  cc:   No referring provider defined for this encounter.

## 2023-08-31 NOTE — PROGRESS NOTES
[facility] [address], [place] [address]   [email or web address]   OFFICE: [phone]            Impression and Plan     1. Coronary disease. Familia has known coronary artery disease. Specifically, he had a history of having had a drug-eluting stent placement to the LAD in February 2014. He had this performed initially in [place]. He was noted to have at that time a significant right coronary artery stenosis and was being considered for stage intervention, but developed progressive chest pain symptoms. He ultimately underwent drug-eluting stent placement (3.0 x 16 mm) to the right coronary artery and was dismissed home 1 May 2014 post PCI.     Regadenoson nuclear perfusion study 22 August 2019 revealed no evidence of infarct or ischemia.  ?  This is stable.  Familia reports no chest pain or other concerning symptoms.  ?  2.  Atrial fibrillation.  As noted below, Familia reports that in December 2022 he had presented to the [facility] and was found to have evidence of atrial fibrillation.  He underwent cardioversion at that time and was started on apixaban.  [facility] states that [facility] results not available.  He does report some intermittent palpitations though rather sporadic.  Plan:  Continue apixaban.  Plan to obtain a 1 month one-patch monitor to see if having other episodes of atrial fibrillation given his palpitations.    3.  Hypertension.  Blood pressure somewhat elevated in the office today though he had gotten lost in getting to the clinic and states he was a little frustrated as a consequence.  Consequently, we will simply continue to monitor.  ?  3. Dyslipidemia. Patient has been intolerant of statin therapy in the past.  As noted in previous communications, we have also have discussed possible trial of ezetimibe or other newer agents though the patient is reticent to pursue this.      Follow-up and further  recommendations pending ambulatory monitor results.       35 minutes spent reviewing prior records (including documentation, laboratory studies, cardiac testing/imaging), interview with patient along with physical exam, planning, and subsequent documentation/crafting of note).           History of Present Illness    Once again I would like to thank you again for asking me to participate in the care of your patient, Familia Sales.  As you know, but to reiterate for my own records, Familia Sales is a 89 year old male with history of having had a drug-eluting stent placement to the LAD in February 2014. He had this performed initially in [place]. He was noted to have at that time a significant right coronary artery stenosis and was being considered for stage intervention, but developed progressive chest pain symptoms. He ultimately underwent drug-eluting stent placement (3.0 x 16 mm) to the right coronary artery and was dismissed home 1 May 2014 post PCI.  ?  On interview today, Familia reports that in December 2022 he had presented to the [facility] and was found to have evidence of atrial fibrillation.  He underwent cardioversion at that time and was started on apixaban.    On interview he reports some intermittent subjective palpitations that are somewhat sporadic.  He has perhaps a couple episodes per week though sometimes will have weeks where he has minimal such symptoms.  He denies chest pain.  Breathing has been comfortable.    Further review of systems is otherwise negative/noncontributory (medical record and 13 point review of systems reviewed as well and pertinent positives noted).         Cardiac Diagnostics         Echocardiogram 15 November 2019:  Normal left ventricular size and systolic performance with ejection fraction of 55 to 60%.  No significant valvular heart disease.  Normal right ventricular size and systolic performance.  Mild biatrial enlargement.    Echocardiogram performed 29 April  "2014:  Normal left ventricular size and systolic performance with ejection fraction of 60%.  No wall motion abnormalities.  Mild concentric increased LV wall thickness.  No significant valvular heart disease.  Moderate left atrial enlargement.    Regadenoson nuclear perfusion study 22 August 2019:  No evidence of infarct or ischemia.  Normal left ventricular systolic performance with ejection fraction of 69%.    Regadenoson nuclear perfusion study 15 May 2015:  Subjectively and objectively negative regadenoson infusion.  Regadenoson nuclear imaging does not suggest any ischemia or prior scar.  Preserved wall motion as mentioned above without any obvious wall motion abnormalities.    Coronary angiogram 30 April 2014:  Right dominant coronary arterial system.  Previous stent in proximal LAD exhibited no restenosis.  First diagonal coronary artery exhibited moderate-severe disease in the proximal segment, however, vessel arises from within stent segment of proximal LAD.  PCI directed to right coronary artery stenosis (3.0×16 mm Promise Premier drug-eluting stent)    12-lead ECG (personally reviewed) 21 July 2014 revealed sinus rhythm with evidence of prior inferior infarct. ECG is unchanged from one May 2014.         Physical Examination       BP (!) 151/72 (BP Location: Left arm, Patient Position: Sitting, Cuff Size: Adult Regular)   Pulse 64   Resp 16   Ht 1.765 m (5' 9.5\")   Wt 84.6 kg (186 lb 9.6 oz)   SpO2 95%   BMI 27.16 kg/m          Wt Readings from Last 3 Encounters:   08/31/23 84.6 kg (186 lb 9.6 oz)   02/18/22 87.1 kg (192 lb)   02/01/22 86.2 kg (190 lb)       The patient is alert and oriented times three. Sclerae are anicteric. Mucosal membranes are moist. Jugular venous pressure is normal. No significant adenopathy/thyromegally appreciated. Lungs are clear with good expansion. On cardiovascular exam, the patient has a regular S1 and S2. Abdomen is soft and non-tender. Extremities reveal no clubbing, " cyanosis, or edema.       Family History/Social History/Risk Factors   Patient does not smoke.  Family history reviewed, and family history includes Cancer in his father, maternal grandfather, and mother; Depression in his daughter; Diabetes in his daughter; Gastrointestinal Disease in his mother; Liver Disease in his daughter.          Medical History  Surgical History Family History Social History   No past medical history on file.  Past Surgical History:   Procedure Laterality Date    APPENDECTOMY  1950    CORONARY STENT PLACEMENT  4/30/2014    EYE SURGERY      FOOT SURGERY      HC DEST LOC LES CHOROID, PHOTOCOAG >=1 SESSION  12/13/06    LASIK    HERNIA REPAIR      HERNIA REPAIR, UMBILICAL  09/19/06    OTHER SURGICAL HISTORY      decompression l3    OTHER SURGICAL HISTORY      spinal fusion L4-L5    RELEASE CARPAL TUNNEL Right 10/29/2014    REPLACEMENT TOTAL KNEE      SURGICAL HISTORY OF -   APRIL 1959    CARTILAGE REMOVED FROM NOSE    SURGICAL HISTORY OF -   08/27/2008    LUMBAR DECOMPRESSION AT L2-3 AND L3-4    SURGICAL HISTORY OF -   1/20/09 THRU 3/06/09    CORTIZONE INJECTIOSN A SERIES OF 8    SURGICAL HISTORY OF -   8/09    INJECTION L3 NERVE ENDING    Z ANESTH,LUMBAR SPINE,CORD SURGERY  10/28/97 & 09/21/98    L3 to sacrum with spinal fusion L4-L% and decompression L5-S1    ZZC ANESTH,TOTAL KNEE ARTHROPLASTY  04/16/03    with revision left 08/02/04    ZC APPENDECTOMY  MAY 1949 OR 1950    Z FOOT/TOES SURGERY PROC UNLISTED  MARCH 1989     Family History   Problem Relation Age of Onset    Gastrointestinal Disease Mother         ulcers    Cancer Mother     Cancer Maternal Grandfather     Depression Daughter     Liver Disease Daughter     Diabetes Daughter     Cancer Father         Social History     Socioeconomic History    Marital status:      Spouse name: Not on file    Number of children: Not on file    Years of education: Not on file    Highest education level: Not on file   Occupational History     Not on file   Tobacco Use    Smoking status: Former     Packs/day: 1.00     Years: 24.00     Pack years: 24.00     Types: Cigarettes     Quit date: 1964     Years since quittin.7    Smokeless tobacco: Never   Substance and Sexual Activity    Alcohol use: Yes     Comment: very rare    Drug use: No    Sexual activity: Not Currently     Partners: Female   Other Topics Concern    Parent/sibling w/ CABG, MI or angioplasty before 65F 55M? Not Asked   Social History Narrative    Not on file     Social Determinants of Health     Financial Resource Strain: Not on file   Food Insecurity: Not on file   Transportation Needs: Not on file   Physical Activity: Not on file   Stress: Not on file   Social Connections: Not on file   Intimate Partner Violence: Not on file   Housing Stability: Not on file           Medications  Allergies   Current Outpatient Medications   Medication Sig Dispense Refill    acetaminophen (TYLENOL) 500 MG tablet Take 500-1,000 mg by mouth every 6 hours as needed for mild pain      aspirin 81 MG EC tablet Take 81 mg by mouth daily      betamethasone dipropionate (DIPROSONE) 0.05 % external cream APPLY THIN LAYER TO AFFECTED AREA TOPICALLY TWICE A DAY (AVOID FACE, GROIN AND ARMPITS) FORFEET      celecoxib (CELEBREX) 100 MG capsule Take 100 mg by mouth 2 times daily as needed for pain      clotrimazole (LOTRIMIN) 1 % external cream APPLY THIN LAYER TOPICALLY TWICE A DAY **EXTERNAL USE ONLY**  FOR FEET MIX WITH  BETAMETHASONE AND APPLY TO FEET FOR 21 DAYS PER DR. MA      [facility] GLUCOSAMINE-CHONDROIT-MSM PO 1 tablet daily      ELIQUIS ANTICOAGULANT 5 MG tablet TAKE ONE TABLET BY MOUTH EVERY 12 HOURS TO PREVENT AND/OR TREAT BLOOD CLOTS      ferrous sulfate (FE TABS) 325 (65 Fe) MG EC tablet Take 325 mg by mouth daily      Flaxseed, Linseed, (FLAX SEED OIL) 1000 MG capsule Take by mouth daily      furosemide (LASIX) 20 MG tablet TAKE ONE TABLET BY MOUTH EVERY DAY FOR EXCESS FLUID      GLUCOSAMINE  SULFATE PO Take 200 mg by mouth daily       hydrocortisone 2.5 % cream Apply topically 2 times daily      ketoconazole (NIZORAL) 2 % external shampoo Shampoo scalp 3 times weekly      lidocaine (LIDODERM) 5 % patch Place 2 patches onto the skin every 24 hours To prevent lidocaine toxicity, patient should be patch free for 12 hrs daily.      nitroGLYcerin (NITROSTAT) 0.4 MG sublingual tablet Place 0.4 mg under the tongue every 5 minutes as needed for chest pain For chest pain place 1 tablet under the tongue every 5 minutes for 3 doses. If symptoms persist 5 minutes after 1st dose call 911.      olopatadine (PATANOL) 0.1 % ophthalmic solution Place 1 drop into both eyes 2 times daily prn      sertraline (ZOLOFT) 50 MG tablet Take 50 mg by mouth daily       Ubiquinol 200 MG CAPS Take 1 capsule by mouth daily      VYNDAQEL 20 MG       amoxicillin (AMOXIL) 500 MG capsule Take 500 mg by mouth Take four capsules 1 hour before dental procedures. (Patient not taking: Reported on 8/31/2023)         Allergies   Allergen Reactions    Codeine     Duloxetine     Dye [Contrast Dye]      ONE REACTION TO INJECTED DYE IN SHOULDER, ABOUT MAY 1965.    Morphine     Morphine And Related Itching    Motrin [Ibuprofen Micronized] Itching    Tagamet Itching    Vicodin [Hydrocodone-Acetaminophen]      EXTREME SWEATING, FLUSHING AND LIGHT-HEADEDNESS.      Hay Fever & [A.R.M.]           Lab Results    Chemistry/lipid CBC Cardiac Enzymes/BNP/TSH/INR   Recent Labs   Lab Test 11/07/16  0915   CHOL 219*   HDL 49   *   TRIG 121     Recent Labs   Lab Test 11/07/16  0915 11/12/15  0853 08/04/15  0825   * 137* 118     Recent Labs   Lab Test 02/01/22  1122      POTASSIUM 3.8   CHLORIDE 110*   CO2 27   *   BUN 18   CR 0.83   GFRESTIMATED 83   VINAY 10.1     Recent Labs   Lab Test 02/01/22  1122 03/19/21  1645 07/22/20  1609   CR 0.83 0.76 0.76     No results for input(s): A1C in the last 27280 hours.       Recent Labs   Lab Test  02/01/22  1122   WBC 9.8   HGB 14.7   HCT 43.2   MCV 90        Recent Labs   Lab Test 02/01/22  1122 03/19/21  1645 07/22/20  1609   HGB 14.7 15.0 14.9    Recent Labs   Lab Test 11/14/19  1657 01/05/18  1130   TROPONINI 0.05 0.02     No results for input(s): BNP, NTBNPI, NTBNP in the last 75802 hours.  Recent Labs   Lab Test 03/19/21  1645   TSH 1.13     Recent Labs   Lab Test 11/14/19  1657   INR 1.10          Medications  Allergies   Current Outpatient Medications   Medication Sig Dispense Refill    acetaminophen (TYLENOL) 500 MG tablet Take 500-1,000 mg by mouth every 6 hours as needed for mild pain      aspirin 81 MG EC tablet Take 81 mg by mouth daily      betamethasone dipropionate (DIPROSONE) 0.05 % external cream APPLY THIN LAYER TO AFFECTED AREA TOPICALLY TWICE A DAY (AVOID FACE, GROIN AND ARMPITS) FORFEET      celecoxib (CELEBREX) 100 MG capsule Take 100 mg by mouth 2 times daily as needed for pain      clotrimazole (LOTRIMIN) 1 % external cream APPLY THIN LAYER TOPICALLY TWICE A DAY **EXTERNAL USE ONLY**  FOR FEET MIX WITH  BETAMETHASONE AND APPLY TO FEET FOR 21 DAYS PER DR. MA      [facility] GLUCOSAMINE-CHONDROIT-MSM PO 1 tablet daily      ELIQUIS ANTICOAGULANT 5 MG tablet TAKE ONE TABLET BY MOUTH EVERY 12 HOURS TO PREVENT AND/OR TREAT BLOOD CLOTS      ferrous sulfate (FE TABS) 325 (65 Fe) MG EC tablet Take 325 mg by mouth daily      Flaxseed, Linseed, (FLAX SEED OIL) 1000 MG capsule Take by mouth daily      furosemide (LASIX) 20 MG tablet TAKE ONE TABLET BY MOUTH EVERY DAY FOR EXCESS FLUID      GLUCOSAMINE SULFATE PO Take 200 mg by mouth daily       hydrocortisone 2.5 % cream Apply topically 2 times daily      ketoconazole (NIZORAL) 2 % external shampoo Shampoo scalp 3 times weekly      lidocaine (LIDODERM) 5 % patch Place 2 patches onto the skin every 24 hours To prevent lidocaine toxicity, patient should be patch free for 12 hrs daily.      nitroGLYcerin (NITROSTAT) 0.4 MG sublingual tablet  Place 0.4 mg under the tongue every 5 minutes as needed for chest pain For chest pain place 1 tablet under the tongue every 5 minutes for 3 doses. If symptoms persist 5 minutes after 1st dose call 911.      olopatadine (PATANOL) 0.1 % ophthalmic solution Place 1 drop into both eyes 2 times daily prn      sertraline (ZOLOFT) 50 MG tablet Take 50 mg by mouth daily       Ubiquinol 200 MG CAPS Take 1 capsule by mouth daily      VYNDAQEL 20 MG       amoxicillin (AMOXIL) 500 MG capsule Take 500 mg by mouth Take four capsules 1 hour before dental procedures. (Patient not taking: Reported on 8/31/2023)        Allergies   Allergen Reactions    Codeine     Duloxetine     Dye [Contrast Dye]      ONE REACTION TO INJECTED DYE IN SHOULDER, ABOUT MAY 1965.    Morphine     Morphine And Related Itching    Motrin [Ibuprofen Micronized] Itching    Tagamet Itching    Vicodin [Hydrocodone-Acetaminophen]      EXTREME SWEATING, FLUSHING AND LIGHT-HEADEDNESS.      Hay Fever & [A.R.M.]           Lab Results   Lab Results   Component Value Date     02/01/2022     03/19/2021    CO2 27 02/01/2022    CO2 24 03/19/2021    BUN 18 02/01/2022    BUN 22 03/19/2021     Lab Results   Component Value Date    WBC 9.8 02/01/2022    WBC 6.9 03/19/2021    HGB 14.7 02/01/2022    HGB 15.0 03/19/2021    HCT 43.2 02/01/2022    HCT 44.4 03/19/2021    MCV 90 02/01/2022    MCV 90 03/19/2021     02/01/2022     03/19/2021     Lab Results   Component Value Date    CHOL 219 11/07/2016    TRIG 121 11/07/2016    HDL 49 11/07/2016     Lab Results   Component Value Date    INR 1.10 11/14/2019     Lab Results   Component Value Date    TROPONINI 0.05 11/14/2019    TROPONINI 0.02 01/05/2018     Lab Results   Component Value Date    TSH 1.13 03/19/2021

## 2023-09-06 ENCOUNTER — APPOINTMENT (OUTPATIENT)
Dept: CT IMAGING | Facility: HOSPITAL | Age: 88
End: 2023-09-06
Attending: EMERGENCY MEDICINE
Payer: MEDICARE

## 2023-09-06 ENCOUNTER — HOSPITAL ENCOUNTER (EMERGENCY)
Facility: HOSPITAL | Age: 88
Discharge: HOME | End: 2023-09-06
Attending: EMERGENCY MEDICINE | Admitting: EMERGENCY MEDICINE
Payer: MEDICARE

## 2023-09-06 ENCOUNTER — APPOINTMENT (OUTPATIENT)
Dept: RADIOLOGY | Facility: HOSPITAL | Age: 88
End: 2023-09-06
Attending: EMERGENCY MEDICINE
Payer: MEDICARE

## 2023-09-06 VITALS
OXYGEN SATURATION: 94 % | TEMPERATURE: 97.7 F | HEIGHT: 70 IN | SYSTOLIC BLOOD PRESSURE: 132 MMHG | DIASTOLIC BLOOD PRESSURE: 71 MMHG | BODY MASS INDEX: 25.91 KG/M2 | WEIGHT: 181 LBS | RESPIRATION RATE: 12 BRPM | HEART RATE: 55 BPM

## 2023-09-06 DIAGNOSIS — S00.83XA FACIAL CONTUSION, INITIAL ENCOUNTER: ICD-10-CM
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
## 2023-09-06 PROCEDURE — 99285 EMERGENCY DEPT VISIT HI MDM: CPT | Mod: 25

## 2023-09-06 PROCEDURE — G1010 CDSM STANSON: HCPCS

## 2023-09-06 PROCEDURE — 71101 X-RAY EXAM UNILAT RIBS/CHEST: CPT | Mod: RT

## 2023-09-06 PROCEDURE — 250N000011 HC RX IP 250 OP 636: Performed by: EMERGENCY MEDICINE

## 2023-09-06 PROCEDURE — 90471 IMMUNIZATION ADMIN: CPT | Performed by: EMERGENCY MEDICINE

## 2023-09-06 PROCEDURE — 72125 CT NECK SPINE W/O DYE: CPT | Mod: MF

## 2023-09-06 PROCEDURE — 250N000009 HC RX 250: Performed by: EMERGENCY MEDICINE

## 2023-09-06 PROCEDURE — 70486 CT MAXILLOFACIAL W/O DYE: CPT | Mod: MG

## 2023-09-06 PROCEDURE — 90714 TD VACC NO PRESV 7 YRS+ IM: CPT | Performed by: EMERGENCY MEDICINE

## 2023-09-06 RX ORDER — METHYLCELLULOSE 4000CPS 30 %
POWDER (GRAM) MISCELLANEOUS
Status: DISCONTINUED | OUTPATIENT
Start: 2023-09-06 | End: 2023-09-06 | Stop reason: HOSPADM

## 2023-09-06 RX ORDER — GINSENG 100 MG
CAPSULE ORAL ONCE
Status: COMPLETED | OUTPATIENT
Start: 2023-09-06 | End: 2023-09-06

## 2023-09-06 RX ADMIN — Medication 3 ML: at 12:18

## 2023-09-06 RX ADMIN — CLOSTRIDIUM TETANI TOXOID ANTIGEN (FORMALDEHYDE INACTIVATED) AND CORYNEBACTERIUM DIPHTHERIAE TOXOID ANTIGEN (FORMALDEHYDE INACTIVATED) 0.5 ML: 5; 2 INJECTION, SUSPENSION INTRAMUSCULAR at 14:20

## 2023-09-06 RX ADMIN — BACITRACIN: 500 OINTMENT TOPICAL at 14:23

## 2023-09-06 ASSESSMENT — ACTIVITIES OF DAILY LIVING (ADL)
ADLS_ACUITY_SCORE: 35
ADLS_ACUITY_SCORE: 35

## 2023-09-06 NOTE — ED PROVIDER NOTES
EMERGENCY DEPARTMENT ENCOUNTER      NAME: Familia Sales  AGE: 89 year old male  YOB: 1931  MRN: [id number]  EVALUATION DATE & TIME: No admission date for patient encounter.    PCP: Josefa Saleh    ED PROVIDER: Ryley Higginbotham D.O.      Chief Complaint   Patient presents with    Fall    Head Injury       FINAL IMPRESSION:  1. Facial contusion, initial encounter    2. Fall, initial encounter        ED COURSE & MEDICAL DECISION MAKIN:53 AM I met with the patient to gather history and to perform my initial exam. I discussed the plan for care while in the Emergency Department.  1:34 PM Patient reported tenderness in his upper ribcage.  2:35 PM patient care turned over to Dr. Reyna       Pertinent Labs & Imaging studies reviewed. (See chart for details)  89 year old male presents to the Emergency Department for evaluation of fall while getting out of his car today.  Patient reports that he stood up quickly, felt slightly lightheaded causing himself to fall but never lost consciousness.  No true syncopal episode.  He had no chest pain or shortness of breath.  He fell onto his face and had abrasions on bilateral knees as well.  CT scan of the head, face, and C-spine do not show any evidence of fracture, intracranial bleed, or other acute process.  At this time his fall would be related to a cardiac event, and I did not believe EKG or lab testing was indicated.  After CT imaging was performed, and it was determined that the patient did not have any injuries that required suturing, did plan on discharge to home.  Reevaluated the patient prior to discharge and he was having some reproducible right-sided chest wall lateral tenderness, therefore x-ray to evaluate for potential fracture is pending.  If this is negative I believe the patient could be safely discharged home.    Medical Decision Making    History:  Supplemental history from: Documented in chart, if applicable  External Record(s) reviewed:  Documented in chart, if applicable.    Work Up:  Chart documentation includes differential considered and any EKGs or imaging independently interpreted by provider, where specified.  In additional to work up documented, I considered the following work up: Documented in chart, if applicable.    External consultation:  Discussion of management with another provider: Documented in chart, if applicable    Complicating factors:  Care impacted by chronic illness: N/A  Care affected by social determinants of health: N/A    Disposition considerations: Admission considered. Patient was signed out to the oncoming physician, disposition pending.        At the conclusion of the encounter I discussed the results of all of the tests and the disposition. The questions were answered. The patient or family acknowledged understanding and was agreeable with the care plan.        HPI    Patient information was obtained from: patient    Use of : N/A       Familia Sales is a 89 year old male who presents injury from a fall.    Patient was here for a cardiology appointment, but fell in the parking lot getting out of his car and a bystander helped him in to the ED. He has a history of A-fib, but doesn't have any devices for it. He felt lightheaded, but did not lose consciousness. He doesn't remember when his last tetanus shot was. He's had no chest pain or shortness of breath.        REVIEW OF SYSTEMS  Constitutional:  Denies fever, chills, weight loss or weakness  Eyes:  No pain, discharge, redness  HENT:  Denies sore throat, ear pain, congestion  Respiratory: No SOB, wheeze or cough  Cardiovascular:  No CP, palpitations  GI:  Denies abdominal pain, nausea, vomiting, diarrhea  : Denies dysuria, hematuria  Musculoskeletal:  Denies any new muscle/joint pain, swelling or loss of function.  Skin:  Denies rash, pallor Positive for abrasion by his right eye and his knees.  Neurologic:  Denies headache, focal weakness or sensory  changes  Lymph: Denies swollen nodes    All other systems negative unless noted in HPI.    PAST MEDICAL HISTORY:  No past medical history on file.    PAST SURGICAL HISTORY:  Past Surgical History:   Procedure Laterality Date    APPENDECTOMY  1950    CORONARY STENT PLACEMENT  4/30/2014    EYE SURGERY      FOOT SURGERY      HC DEST LOC LES CHOROID, PHOTOCOAG >=1 SESSION  12/13/06    LASIK    HERNIA REPAIR      HERNIA REPAIR, UMBILICAL  09/19/06    OTHER SURGICAL HISTORY      decompression l3    OTHER SURGICAL HISTORY      spinal fusion L4-L5    RELEASE CARPAL TUNNEL Right 10/29/2014    REPLACEMENT TOTAL KNEE      SURGICAL HISTORY OF -   APRIL 1959    CARTILAGE REMOVED FROM NOSE    SURGICAL HISTORY OF -   08/27/2008    LUMBAR DECOMPRESSION AT L2-3 AND L3-4    SURGICAL HISTORY OF -   1/20/09 THRU 3/06/09    CORTIZONE INJECTIOSN A SERIES OF 8    SURGICAL HISTORY OF -   8/09    INJECTION L3 NERVE ENDING    [facility] ANESTH,LUMBAR SPINE,CORD SURGERY  10/28/97 & 09/21/98    L3 to sacrum with spinal fusion L4-L% and decompression L5-S1    [facility] ANESTH,TOTAL KNEE ARTHROPLASTY  04/16/03    with revision left 08/02/04    [facility] APPENDECTOMY  MAY 1949 OR 1950    [facility] FOOT/TOES SURGERY PROC UNLISTED  MARCH 1989         CURRENT MEDICATIONS:    Current Facility-Administered Medications   Medication    bacitracin ointment    methylcellulose powder    Td (tetanus & diphtheria toxoids) -  adult formulation - for ages 7 years and older     Current Outpatient Medications   Medication    acetaminophen (TYLENOL) 500 MG tablet    amoxicillin (AMOXIL) 500 MG capsule    aspirin 81 MG EC tablet    betamethasone dipropionate (DIPROSONE) 0.05 % external cream    celecoxib (CELEBREX) 100 MG capsule    clotrimazole (LOTRIMIN) 1 % external cream    [facility] GLUCOSAMINE-CHONDROIT-MSM PO    ELIQUIS ANTICOAGULANT 5 MG tablet    ferrous sulfate (FE TABS) 325 (65 Fe) MG EC tablet    Flaxseed, Linseed, (FLAX SEED OIL) 1000 MG capsule    furosemide (LASIX) 20 MG tablet  "   GLUCOSAMINE SULFATE PO    hydrocortisone 2.5 % cream    ketoconazole (NIZORAL) 2 % external shampoo    lidocaine (LIDODERM) 5 % patch    nitroGLYcerin (NITROSTAT) 0.4 MG sublingual tablet    olopatadine (PATANOL) 0.1 % ophthalmic solution    sertraline (ZOLOFT) 50 MG tablet    Ubiquinol 200 MG CAPS    VYNDAQEL 20 MG         ALLERGIES:  Allergies   Allergen Reactions    Codeine     Duloxetine     Dye [Contrast Dye]      ONE REACTION TO INJECTED DYE IN SHOULDER, ABOUT MAY 1965.    Morphine     Morphine And Related Itching    Motrin [Ibuprofen Micronized] Itching    Tagamet Itching    Vicodin [Hydrocodone-Acetaminophen]      EXTREME SWEATING, FLUSHING AND LIGHT-HEADEDNESS.      Hay Fever & [A.R.M.]        FAMILY HISTORY:  Family History   Problem Relation Age of Onset    Gastrointestinal Disease Mother         ulcers    Cancer Mother     Cancer Maternal Grandfather     Depression Daughter     Liver Disease Daughter     Diabetes Daughter     Cancer Father        SOCIAL HISTORY:  Social History     Socioeconomic History    Marital status:    Tobacco Use    Smoking status: Former     Packs/day: 1.00     Years: 24.00     Pack years: 24.00     Types: Cigarettes     Quit date: 1964     Years since quittin.7    Smokeless tobacco: Never   Substance and Sexual Activity    Alcohol use: Yes     Comment: very rare    Drug use: No    Sexual activity: Not Currently     Partners: Female       VITALS:  Patient Vitals for the past 24 hrs:   BP Temp Temp src Pulse Resp SpO2 Height Weight   23 1300 134/63 -- -- 60 13 96 % -- --   23 1256 (!) 143/68 -- -- 63 21 97 % -- --   23 1158 (!) 150/67 97.7  F (36.5  C) Oral 68 18 97 % 1.765 m (5' 9.5\") 82.1 kg (181 lb)       PHYSICAL EXAM    VITAL SIGNS: /63   Pulse 60   Temp 97.7  F (36.5  C) (Oral)   Resp 13   Ht 1.765 m (5' 9.5\")   Wt 82.1 kg (181 lb)   SpO2 96%   BMI 26.35 kg/m      General Appearance: Well-appearing, well-nourished, no acute " distress   Head:  Normocephalic, without obvious abnormality. Abrasion above right eye, skin tear lateral to right eye, small abrasions to the nasal bridge,  Eyes:  PERRL, conjunctiva/corneas clear, EOM's intact,  ENT:  Lips, mucosa, and tongue normal, membranes are moist without pallor,  Neck:  Normal ROM, symmetrical, trachea midline    Chest:  No deformity, no crepitus. Right superior lateral chest wall tenderness  Cardio:  Regular rate and rhythm, no murmur, rub or gallop, 2+ pulses symmetric in all extremities  Pulm:  Clear to auscultation bilaterally, respirations unlabored,  Abdomen:  Soft, non-tender, no rebound or guarding.  Musculoskeletal: Full ROM, no edema, no cyanosis, good ROM of major joints  Integument:  Warm, Dry, No erythema, No rash. Bilateral abrasions on knees.  Neurologic:  Alert & oriented.  No focal deficits appreciated.  Ambulatory.  Psychiatric:  Affect normal, Judgment normal, Mood normal.      LABS  Results for orders placed or performed during the hospital encounter of 09/06/23 (from the past 24 hour(s))   Cervical spine CT w/o contrast    Narrative    EXAM: CT CERVICAL SPINE W/O CONTRAST, CT FACIAL BONES WITHOUT CONTRAST, CT HEAD W/O CONTRAST  LOCATION: [facility]  DATE: 9/6/2023    INDICATION: trauma  COMPARISON: None.  TECHNIQUE:   1) Routine CT Head without IV contrast. Multiplanar reformats. Dose reduction techniques were used.  2) Routine CT Facial Bones without IV contrast. Multiplanar reformats. Dose reduction techniques were used.  3) Routine CT Cervical Spine without IV contrast. Multiplanar reformats. Dose reduction techniques were used.    FINDINGS:  HEAD CT:   INTRACRANIAL CONTENTS: No acute intracranial hemorrhage or abnormal extra-axial fluid collection. No CT evidence of acute infarct. Normal parenchymal density for age. The ventricles and sulci are normal for age.     OSSEOUS STRUCTURES/SOFT TISSUES: No significant abnormality.     FACIAL BONE  CT:  OSSEOUS STRUCTURES/SOFT TISSUES: No localized soft tissue swelling/inflammation. No facial bone fracture or malalignment. No evidence for dental trauma or periapical abscess.    ORBITAL CONTENTS: Prior bilateral cataract surgery. Visualized portions of the orbits are otherwise unremarkable.    SINUSES: No significant paranasal sinus mucosal disease.    CERVICAL SPINE CT:   VERTEBRA: Normal craniocervical and atlantoaxial alignment. 2 mm anterolisthesis at C3-C4,, C4-C5, C5-C6, and C7-T1. No fracture or posttraumatic subluxation.     CANAL/FORAMINA: Moderate spinal canal stenosis at C3-C4. Moderate neural foraminal stenosis bilaterally at C3-C4 and on the right at C5-C6..    PARASPINAL: No extraspinal abnormality. Visualized lung fields are clear.      Impression    IMPRESSION:  HEAD CT:  1.  Normal head CT.    FACIAL BONE CT:  1.  No maxillofacial or mandibular fracture.  2.  No acute orbital abnormality.    CERVICAL SPINE CT:  1.  No fracture or posttraumatic subluxation.  2.  No high-grade spinal canal or neural foraminal stenosis.   Head CT w/o contrast    Narrative    EXAM: CT CERVICAL SPINE W/O CONTRAST, CT FACIAL BONES WITHOUT CONTRAST, CT HEAD W/O CONTRAST  LOCATION: [facility]  DATE: 9/6/2023    INDICATION: trauma  COMPARISON: None.  TECHNIQUE:   1) Routine CT Head without IV contrast. Multiplanar reformats. Dose reduction techniques were used.  2) Routine CT Facial Bones without IV contrast. Multiplanar reformats. Dose reduction techniques were used.  3) Routine CT Cervical Spine without IV contrast. Multiplanar reformats. Dose reduction techniques were used.    FINDINGS:  HEAD CT:   INTRACRANIAL CONTENTS: No acute intracranial hemorrhage or abnormal extra-axial fluid collection. No CT evidence of acute infarct. Normal parenchymal density for age. The ventricles and sulci are normal for age.     OSSEOUS STRUCTURES/SOFT TISSUES: No significant abnormality.     FACIAL BONE  CT:  OSSEOUS STRUCTURES/SOFT TISSUES: No localized soft tissue swelling/inflammation. No facial bone fracture or malalignment. No evidence for dental trauma or periapical abscess.    ORBITAL CONTENTS: Prior bilateral cataract surgery. Visualized portions of the orbits are otherwise unremarkable.    SINUSES: No significant paranasal sinus mucosal disease.    CERVICAL SPINE CT:   VERTEBRA: Normal craniocervical and atlantoaxial alignment. 2 mm anterolisthesis at C3-C4,, C4-C5, C5-C6, and C7-T1. No fracture or posttraumatic subluxation.     CANAL/FORAMINA: Moderate spinal canal stenosis at C3-C4. Moderate neural foraminal stenosis bilaterally at C3-C4 and on the right at C5-C6..    PARASPINAL: No extraspinal abnormality. Visualized lung fields are clear.      Impression    IMPRESSION:  HEAD CT:  1.  Normal head CT.    FACIAL BONE CT:  1.  No maxillofacial or mandibular fracture.  2.  No acute orbital abnormality.    CERVICAL SPINE CT:  1.  No fracture or posttraumatic subluxation.  2.  No high-grade spinal canal or neural foraminal stenosis.   CT Facial Bones without Contrast    Narrative    EXAM: CT CERVICAL SPINE W/O CONTRAST, CT FACIAL BONES WITHOUT CONTRAST, CT HEAD W/O CONTRAST  LOCATION: [facility]  DATE: 9/6/2023    INDICATION: trauma  COMPARISON: None.  TECHNIQUE:   1) Routine CT Head without IV contrast. Multiplanar reformats. Dose reduction techniques were used.  2) Routine CT Facial Bones without IV contrast. Multiplanar reformats. Dose reduction techniques were used.  3) Routine CT Cervical Spine without IV contrast. Multiplanar reformats. Dose reduction techniques were used.    FINDINGS:  HEAD CT:   INTRACRANIAL CONTENTS: No acute intracranial hemorrhage or abnormal extra-axial fluid collection. No CT evidence of acute infarct. Normal parenchymal density for age. The ventricles and sulci are normal for age.     OSSEOUS STRUCTURES/SOFT TISSUES: No significant abnormality.     FACIAL  BONE CT:  OSSEOUS STRUCTURES/SOFT TISSUES: No localized soft tissue swelling/inflammation. No facial bone fracture or malalignment. No evidence for dental trauma or periapical abscess.    ORBITAL CONTENTS: Prior bilateral cataract surgery. Visualized portions of the orbits are otherwise unremarkable.    SINUSES: No significant paranasal sinus mucosal disease.    CERVICAL SPINE CT:   VERTEBRA: Normal craniocervical and atlantoaxial alignment. 2 mm anterolisthesis at C3-C4,, C4-C5, C5-C6, and C7-T1. No fracture or posttraumatic subluxation.     CANAL/FORAMINA: Moderate spinal canal stenosis at C3-C4. Moderate neural foraminal stenosis bilaterally at C3-C4 and on the right at C5-C6..    PARASPINAL: No extraspinal abnormality. Visualized lung fields are clear.      Impression    IMPRESSION:  HEAD CT:  1.  Normal head CT.    FACIAL BONE CT:  1.  No maxillofacial or mandibular fracture.  2.  No acute orbital abnormality.    CERVICAL SPINE CT:  1.  No fracture or posttraumatic subluxation.  2.  No high-grade spinal canal or neural foraminal stenosis.         RADIOLOGY  CT Facial Bones without Contrast   Final Result   IMPRESSION:   HEAD CT:   1.  Normal head CT.      FACIAL BONE CT:   1.  No maxillofacial or mandibular fracture.   2.  No acute orbital abnormality.      CERVICAL SPINE CT:   1.  No fracture or posttraumatic subluxation.   2.  No high-grade spinal canal or neural foraminal stenosis.      Head CT w/o contrast   Final Result   IMPRESSION:   HEAD CT:   1.  Normal head CT.      FACIAL BONE CT:   1.  No maxillofacial or mandibular fracture.   2.  No acute orbital abnormality.      CERVICAL SPINE CT:   1.  No fracture or posttraumatic subluxation.   2.  No high-grade spinal canal or neural foraminal stenosis.      Cervical spine CT w/o contrast   Final Result   IMPRESSION:   HEAD CT:   1.  Normal head CT.      FACIAL BONE CT:   1.  No maxillofacial or mandibular fracture.   2.  No acute orbital abnormality.       CERVICAL SPINE CT:   1.  No fracture or posttraumatic subluxation.   2.  No high-grade spinal canal or neural foraminal stenosis.      Ribs XR, unilat 3 views + PA chest, right    (Results Pending)            MEDICATIONS GIVEN IN THE EMERGENCY:  Medications   methylcellulose powder (has no administration in time range)   Td (tetanus & diphtheria toxoids) -  adult formulation - for ages 7 years and older (has no administration in time range)   bacitracin ointment (has no administration in time range)   lido-EPINEPHrine-tetracaine (LET) topical gel GEL (3 mLs Topical $Given 9/6/23 1218)       NEW PRESCRIPTIONS STARTED AT TODAY'S ER VISIT  New Prescriptions    No medications on file        I, Tammy Lees, am serving as a scribe to document services personally performed by Ryley Higginbotham D.O., based on my observations and the provider's statements to me.  I, Ryley Higginbotham D.O., attest that Tammy Lees is acting in a scribe capacity, has observed my performance of the services and has documented them in accordance with my direction.     Ryley Higginbotham D.O.  Emergency Medicine  [facility] EMERGENCY DEPARTMENT  [address] [place] [id number]  [phone]  Dept: [phone]       Ryley Higginbotham,   09/06/23 1705

## 2023-09-06 NOTE — ED NOTES
Steri-strip applied to skin tear on the r side of the face, applied nonstck dressing and applied with cobon.

## 2023-09-06 NOTE — ED NOTES
EMERGENCY DEPARTMENT SIGN OUT NOTE        ED COURSE AND MEDICAL DECISION MAKING  1:00 PM Patient was signed out to me by Dr Ryley Higginbotham.  In brief, Familia Sales is a 89 year old male who initially presented injury from fall. Patient was here for a cardiology appointment, but fell in the parking lot getting out of his car and a bystander helped him in to the ED. He has a history of A-fib, but doesn't have any devices for it. He felt lightheaded, but did not lose consciousness. He doesn't remember when his last tetanus shot was. He's had no chest pain or shortness of breath.  At time of sign out, disposition was pending chest xray.  3:55 PM chest x-ray independently interpreted by me does not demonstrate hemothorax, pneumothorax, or acute intrathoracic findings or bony abnormality.  Patient is stable for discharge.    FINAL IMPRESSION    1. Facial contusion, initial encounter    2. Fall, initial encounter        ED MEDS  Medications   methylcellulose powder (has no administration in time range)   lido-EPINEPHrine-tetracaine (LET) topical gel GEL (3 mLs Topical $Given 9/6/23 1218)   Td (tetanus & diphtheria toxoids) -  adult formulation - for ages 7 years and older (0.5 mLs Intramuscular $Given 9/6/23 1420)   bacitracin ointment ( Topical $Given 9/6/23 8241)       LAB  Labs Ordered and Resulted from Time of ED Arrival to Time of ED Departure - No data to display    RADIOLOGY    CT Facial Bones without Contrast   Final Result   IMPRESSION:   HEAD CT:   1.  Normal head CT.      FACIAL BONE CT:   1.  No maxillofacial or mandibular fracture.   2.  No acute orbital abnormality.      CERVICAL SPINE CT:   1.  No fracture or posttraumatic subluxation.   2.  No high-grade spinal canal or neural foraminal stenosis.      Head CT w/o contrast   Final Result   IMPRESSION:   HEAD CT:   1.  Normal head CT.      FACIAL BONE CT:   1.  No maxillofacial or mandibular fracture.   2.  No acute orbital abnormality.      CERVICAL SPINE CT:    1.  No fracture or posttraumatic subluxation.   2.  No high-grade spinal canal or neural foraminal stenosis.      Cervical spine CT w/o contrast   Final Result   IMPRESSION:   HEAD CT:   1.  Normal head CT.      FACIAL BONE CT:   1.  No maxillofacial or mandibular fracture.   2.  No acute orbital abnormality.      CERVICAL SPINE CT:   1.  No fracture or posttraumatic subluxation.   2.  No high-grade spinal canal or neural foraminal stenosis.      Ribs XR, unilat 3 views + PA chest, right    (Results Pending)       DISCHARGE MEDS  New Prescriptions    No medications on file       Gee Reyna M.D.  Emergency Medicine  [facility] EMERGENCY DEPARTMENT  [address] [facility] [id number] [phone]  Dept: [phone]       Gee Reyna MD  09/06/23 9454

## 2023-09-06 NOTE — ED NOTES
"Pt alert and pleasant, denies headache, neck pain, cp and sob. Pt  has dressing and coban wrapped around his head, bleeding controlled. HIMA the wound. noted under R bruises and abrasion on bilat knee. Denies new numbness/tingling. States, \"everything works fine\"  "

## 2023-09-06 NOTE — ED TRIAGE NOTES
Pt coming in for holter monitor placement when he had an unwitnessed fall in the parking lot.  Pt was assisted up and brought to the clinic where they inturn brought him to the ER.  Pt remembers falling states he was getting out of the car when he just went down.  States he guesses he got that feeling/lightheaded and went down.  Pt hit right side of his face and his glasses broke.  Bleeding at present and pressure applied.  Pt is on eliquis.  Pt denies loc and neck or back pain. No c-collar needed per provider.     Triage Assessment       Row Name 09/06/23 1201       Triage Assessment (Adult)    Airway WDL WDL       Respiratory WDL    Respiratory WDL WDL       Skin Circulation/Temperature WDL    Skin Circulation/Temperature WDL --  bilateral abrasions to knees and laceration to right side of face       Peripheral/Neurovascular WDL    Peripheral Neurovascular WDL WDL       Cognitive/Neuro/Behavioral WDL    Cognitive/Neuro/Behavioral WDL WDL

## 2023-09-11 ENCOUNTER — HOSPITAL ENCOUNTER (OUTPATIENT)
Dept: CARDIOLOGY | Facility: HOSPITAL | Age: 88
Discharge: HOME | End: 2023-09-11
Attending: INTERNAL MEDICINE | Admitting: INTERNAL MEDICINE
Payer: MEDICARE

## 2023-09-11 DIAGNOSIS — I48.0 PAROXYSMAL ATRIAL FIBRILLATION (H): ICD-10-CM

## 2023-09-11 PROCEDURE — 93270 REMOTE 30 DAY ECG REV/REPORT: CPT

## 2023-10-17 DIAGNOSIS — I25.10 CORONARY ARTERY DISEASE INVOLVING NATIVE CORONARY ARTERY OF NATIVE HEART WITHOUT ANGINA PECTORIS: ICD-10-CM

## 2023-10-17 DIAGNOSIS — I10 HYPERTENSION, UNSPECIFIED TYPE: ICD-10-CM

## 2023-10-17 DIAGNOSIS — E78.5 DYSLIPIDEMIA: ICD-10-CM

## 2023-10-17 DIAGNOSIS — I48.0 PAROXYSMAL ATRIAL FIBRILLATION (H): Primary | ICD-10-CM

## 2023-10-17 DIAGNOSIS — I25.10 CORONARY ARTERY DISEASE INVOLVING NATIVE CORONARY ARTERY WITHOUT ANGINA PECTORIS, UNSPECIFIED WHETHER NATIVE OR TRANSPLANTED HEART: ICD-10-CM

## 2023-10-17 PROCEDURE — 93228 REMOTE 30 DAY ECG REV/REPORT: CPT | Performed by: INTERNAL MEDICINE

## 2023-11-04 ENCOUNTER — HEALTH MAINTENANCE LETTER (OUTPATIENT)
Age: 88
End: 2023-11-04

## 2023-12-09 ENCOUNTER — HOSPITAL ENCOUNTER (INPATIENT)
Facility: HOSPITAL | Age: 88
LOS: 3 days | Discharge: HOME HEALTH | DRG: 177 | End: 2023-12-13
Attending: EMERGENCY MEDICINE | Admitting: STUDENT IN AN ORGANIZED HEALTH CARE EDUCATION/TRAINING PROGRAM
Payer: MEDICARE

## 2023-12-09 ENCOUNTER — APPOINTMENT (OUTPATIENT)
Dept: RADIOLOGY | Facility: HOSPITAL | Age: 88
DRG: 177 | End: 2023-12-09
Attending: EMERGENCY MEDICINE
Payer: MEDICARE

## 2023-12-09 ENCOUNTER — APPOINTMENT (OUTPATIENT)
Dept: CT IMAGING | Facility: HOSPITAL | Age: 88
DRG: 177 | End: 2023-12-09
Attending: EMERGENCY MEDICINE
Payer: MEDICARE

## 2023-12-09 DIAGNOSIS — J18.9 PNEUMONIA OF RIGHT LOWER LOBE DUE TO INFECTIOUS ORGANISM: ICD-10-CM

## 2023-12-09 DIAGNOSIS — R41.82 ALTERED MENTAL STATUS, UNSPECIFIED ALTERED MENTAL STATUS TYPE: ICD-10-CM

## 2023-12-09 DIAGNOSIS — U07.1 COVID-19 VIRUS INFECTION: ICD-10-CM

## 2023-12-09 DIAGNOSIS — R53.1 GENERAL WEAKNESS: ICD-10-CM

## 2023-12-09 LAB
ANION GAP SERPL CALCULATED.3IONS-SCNC: 11 MMOL/L (ref 7–15)
BASOPHILS # BLD AUTO: 0 10E3/UL (ref 0–0.2)
BASOPHILS NFR BLD AUTO: 0 %
BUN SERPL-MCNC: 17.8 MG/DL (ref 8–23)
CALCIUM SERPL-MCNC: 10.1 MG/DL (ref 8.2–9.6)
CHLORIDE SERPL-SCNC: 103 MMOL/L (ref 98–107)
CREAT SERPL-MCNC: 0.95 MG/DL (ref 0.67–1.17)
DEPRECATED HCO3 PLAS-SCNC: 23 MMOL/L (ref 22–29)
EGFRCR SERPLBLD CKD-EPI 2021: 75 ML/MIN/1.73M2
EOSINOPHIL # BLD AUTO: 0 10E3/UL (ref 0–0.7)
EOSINOPHIL NFR BLD AUTO: 0 %
ERYTHROCYTE [DISTWIDTH] IN BLOOD BY AUTOMATED COUNT: 14.1 % (ref 10–15)
FLUAV RNA SPEC QL NAA+PROBE: NEGATIVE
FLUBV RNA RESP QL NAA+PROBE: NEGATIVE
GLUCOSE SERPL-MCNC: 205 MG/DL (ref 70–99)
HCT VFR BLD AUTO: 39.6 % (ref 40–53)
HGB BLD-MCNC: 13.3 G/DL (ref 13.3–17.7)
HOLD SPECIMEN: NORMAL
IMM GRANULOCYTES # BLD: 0 10E3/UL
IMM GRANULOCYTES NFR BLD: 1 %
LACTATE SERPL-SCNC: 1 MMOL/L (ref 0.7–2)
LYMPHOCYTES # BLD AUTO: 0.6 10E3/UL (ref 0.8–5.3)
LYMPHOCYTES NFR BLD AUTO: 10 %
MCH RBC QN AUTO: 30.9 PG (ref 26.5–33)
MCHC RBC AUTO-ENTMCNC: 33.6 G/DL (ref 31.5–36.5)
MCV RBC AUTO: 92 FL (ref 78–100)
MONOCYTES # BLD AUTO: 0.8 10E3/UL (ref 0–1.3)
MONOCYTES NFR BLD AUTO: 13 %
NEUTROPHILS # BLD AUTO: 4.7 10E3/UL (ref 1.6–8.3)
NEUTROPHILS NFR BLD AUTO: 76 %
NRBC # BLD AUTO: 0 10E3/UL
NRBC BLD AUTO-RTO: 0 /100
PLATELET # BLD AUTO: 153 10E3/UL (ref 150–450)
POTASSIUM SERPL-SCNC: 4.3 MMOL/L (ref 3.4–5.3)
RBC # BLD AUTO: 4.31 10E6/UL (ref 4.4–5.9)
RSV RNA SPEC NAA+PROBE: NEGATIVE
SARS-COV-2 RNA RESP QL NAA+PROBE: POSITIVE
SODIUM SERPL-SCNC: 137 MMOL/L (ref 135–145)
TROPONIN T SERPL HS-MCNC: 53 NG/L
TROPONIN T SERPL HS-MCNC: 54 NG/L
WBC # BLD AUTO: 6.2 10E3/UL (ref 4–11)

## 2023-12-09 PROCEDURE — 258N000003 HC RX IP 258 OP 636: Performed by: EMERGENCY MEDICINE

## 2023-12-09 PROCEDURE — 70450 CT HEAD/BRAIN W/O DYE: CPT | Mod: MA

## 2023-12-09 PROCEDURE — 96365 THER/PROPH/DIAG IV INF INIT: CPT

## 2023-12-09 PROCEDURE — 83605 ASSAY OF LACTIC ACID: CPT | Performed by: EMERGENCY MEDICINE

## 2023-12-09 PROCEDURE — 87040 BLOOD CULTURE FOR BACTERIA: CPT | Performed by: EMERGENCY MEDICINE

## 2023-12-09 PROCEDURE — 96361 HYDRATE IV INFUSION ADD-ON: CPT

## 2023-12-09 PROCEDURE — 84484 ASSAY OF TROPONIN QUANT: CPT | Performed by: EMERGENCY MEDICINE

## 2023-12-09 PROCEDURE — 80048 BASIC METABOLIC PNL TOTAL CA: CPT | Performed by: EMERGENCY MEDICINE

## 2023-12-09 PROCEDURE — 99285 EMERGENCY DEPT VISIT HI MDM: CPT | Mod: 25

## 2023-12-09 PROCEDURE — 36415 COLL VENOUS BLD VENIPUNCTURE: CPT | Performed by: EMERGENCY MEDICINE

## 2023-12-09 PROCEDURE — 36415 COLL VENOUS BLD VENIPUNCTURE: CPT | Performed by: STUDENT IN AN ORGANIZED HEALTH CARE EDUCATION/TRAINING PROGRAM

## 2023-12-09 PROCEDURE — 85025 COMPLETE CBC W/AUTO DIFF WBC: CPT | Performed by: EMERGENCY MEDICINE

## 2023-12-09 PROCEDURE — 93005 ELECTROCARDIOGRAM TRACING: CPT | Performed by: STUDENT IN AN ORGANIZED HEALTH CARE EDUCATION/TRAINING PROGRAM

## 2023-12-09 PROCEDURE — 71046 X-RAY EXAM CHEST 2 VIEWS: CPT

## 2023-12-09 PROCEDURE — 250N000011 HC RX IP 250 OP 636: Mod: JZ | Performed by: EMERGENCY MEDICINE

## 2023-12-09 PROCEDURE — 87637 SARSCOV2&INF A&B&RSV AMP PRB: CPT | Performed by: EMERGENCY MEDICINE

## 2023-12-09 RX ORDER — CEFTRIAXONE 1 G/1
1 INJECTION, POWDER, FOR SOLUTION INTRAMUSCULAR; INTRAVENOUS ONCE
Status: COMPLETED | OUTPATIENT
Start: 2023-12-09 | End: 2023-12-10

## 2023-12-09 RX ADMIN — SODIUM CHLORIDE 500 ML: 9 INJECTION, SOLUTION INTRAVENOUS at 22:20

## 2023-12-09 RX ADMIN — CEFTRIAXONE SODIUM 1 G: 1 INJECTION, POWDER, FOR SOLUTION INTRAMUSCULAR; INTRAVENOUS at 23:30

## 2023-12-09 ASSESSMENT — ENCOUNTER SYMPTOMS
CONFUSION: 1
BACK PAIN: 0
ABDOMINAL PAIN: 0
NECK PAIN: 0
COUGH: 1
FEVER: 1

## 2023-12-09 ASSESSMENT — ACTIVITIES OF DAILY LIVING (ADL): ADLS_ACUITY_SCORE: 35

## 2023-12-10 PROBLEM — J18.9 PNEUMONIA OF RIGHT LOWER LOBE DUE TO INFECTIOUS ORGANISM: Status: ACTIVE | Noted: 2023-12-10

## 2023-12-10 PROBLEM — R53.1 GENERAL WEAKNESS: Status: ACTIVE | Noted: 2023-12-10

## 2023-12-10 PROBLEM — U07.1 COVID-19 VIRUS INFECTION: Status: ACTIVE | Noted: 2023-12-10

## 2023-12-10 PROBLEM — R41.82 ALTERED MENTAL STATUS, UNSPECIFIED ALTERED MENTAL STATUS TYPE: Status: ACTIVE | Noted: 2023-12-10

## 2023-12-10 LAB
ALBUMIN SERPL BCG-MCNC: 4.2 G/DL (ref 3.5–5.2)
ALBUMIN UR-MCNC: 10 MG/DL
ALP SERPL-CCNC: 90 U/L (ref 40–150)
ALT SERPL W P-5'-P-CCNC: 42 U/L (ref 0–70)
ANION GAP SERPL CALCULATED.3IONS-SCNC: 8 MMOL/L (ref 7–15)
APPEARANCE UR: CLEAR
AST SERPL W P-5'-P-CCNC: 30 U/L (ref 0–45)
ATRIAL RATE - MUSE: 72 BPM
BILIRUB SERPL-MCNC: 0.8 MG/DL
BILIRUB UR QL STRIP: NEGATIVE
BUN SERPL-MCNC: 15.2 MG/DL (ref 8–23)
CALCIUM SERPL-MCNC: 10 MG/DL (ref 8.2–9.6)
CHLORIDE SERPL-SCNC: 105 MMOL/L (ref 98–107)
COLOR UR AUTO: YELLOW
CREAT SERPL-MCNC: 0.95 MG/DL (ref 0.67–1.17)
DEPRECATED HCO3 PLAS-SCNC: 26 MMOL/L (ref 22–29)
DIASTOLIC BLOOD PRESSURE - MUSE: NORMAL MMHG
EGFRCR SERPLBLD CKD-EPI 2021: 75 ML/MIN/1.73M2
ERYTHROCYTE [DISTWIDTH] IN BLOOD BY AUTOMATED COUNT: 14.3 % (ref 10–15)
GLUCOSE BLDC GLUCOMTR-MCNC: 141 MG/DL (ref 70–99)
GLUCOSE BLDC GLUCOMTR-MCNC: 151 MG/DL (ref 70–99)
GLUCOSE BLDC GLUCOMTR-MCNC: 165 MG/DL (ref 70–99)
GLUCOSE BLDC GLUCOMTR-MCNC: 254 MG/DL (ref 70–99)
GLUCOSE BLDC GLUCOMTR-MCNC: 265 MG/DL (ref 70–99)
GLUCOSE BLDC GLUCOMTR-MCNC: 288 MG/DL (ref 70–99)
GLUCOSE BLDC GLUCOMTR-MCNC: 311 MG/DL (ref 70–99)
GLUCOSE SERPL-MCNC: 165 MG/DL (ref 70–99)
GLUCOSE UR STRIP-MCNC: 150 MG/DL
HBA1C MFR BLD: 8.2 %
HCT VFR BLD AUTO: 41.6 % (ref 40–53)
HGB BLD-MCNC: 13.6 G/DL (ref 13.3–17.7)
HGB UR QL STRIP: NEGATIVE
HYALINE CASTS: 1 /LPF
INTERPRETATION ECG - MUSE: NORMAL
KETONES UR STRIP-MCNC: NEGATIVE MG/DL
LEUKOCYTE ESTERASE UR QL STRIP: NEGATIVE
MCH RBC QN AUTO: 30.8 PG (ref 26.5–33)
MCHC RBC AUTO-ENTMCNC: 32.7 G/DL (ref 31.5–36.5)
MCV RBC AUTO: 94 FL (ref 78–100)
MUCOUS THREADS #/AREA URNS LPF: PRESENT /LPF
NITRATE UR QL: NEGATIVE
P AXIS - MUSE: 74 DEGREES
PH UR STRIP: 5.5 [PH] (ref 5–7)
PLATELET # BLD AUTO: 157 10E3/UL (ref 150–450)
POTASSIUM SERPL-SCNC: 4.1 MMOL/L (ref 3.4–5.3)
PR INTERVAL - MUSE: 226 MS
PROT SERPL-MCNC: 6.6 G/DL (ref 6.4–8.3)
QRS DURATION - MUSE: 96 MS
QT - MUSE: 418 MS
QTC - MUSE: 457 MS
R AXIS - MUSE: -73 DEGREES
RBC # BLD AUTO: 4.42 10E6/UL (ref 4.4–5.9)
RBC URINE: 1 /HPF
SODIUM SERPL-SCNC: 139 MMOL/L (ref 135–145)
SP GR UR STRIP: 1.02 (ref 1–1.03)
SYSTOLIC BLOOD PRESSURE - MUSE: NORMAL MMHG
T AXIS - MUSE: 67 DEGREES
UROBILINOGEN UR STRIP-MCNC: <2 MG/DL
VENTRICULAR RATE- MUSE: 72 BPM
WBC # BLD AUTO: 5.8 10E3/UL (ref 4–11)
WBC URINE: 1 /HPF

## 2023-12-10 PROCEDURE — 83036 HEMOGLOBIN GLYCOSYLATED A1C: CPT | Performed by: STUDENT IN AN ORGANIZED HEALTH CARE EDUCATION/TRAINING PROGRAM

## 2023-12-10 PROCEDURE — 250N000011 HC RX IP 250 OP 636: Mod: JZ | Performed by: STUDENT IN AN ORGANIZED HEALTH CARE EDUCATION/TRAINING PROGRAM

## 2023-12-10 PROCEDURE — 99223 1ST HOSP IP/OBS HIGH 75: CPT | Mod: AI | Performed by: STUDENT IN AN ORGANIZED HEALTH CARE EDUCATION/TRAINING PROGRAM

## 2023-12-10 PROCEDURE — 120N000001 HC R&B MED SURG/OB

## 2023-12-10 PROCEDURE — XW033E5 INTRODUCTION OF REMDESIVIR ANTI-INFECTIVE INTO PERIPHERAL VEIN, PERCUTANEOUS APPROACH, NEW TECHNOLOGY GROUP 5: ICD-10-PCS | Performed by: STUDENT IN AN ORGANIZED HEALTH CARE EDUCATION/TRAINING PROGRAM

## 2023-12-10 PROCEDURE — 258N000003 HC RX IP 258 OP 636: Performed by: STUDENT IN AN ORGANIZED HEALTH CARE EDUCATION/TRAINING PROGRAM

## 2023-12-10 PROCEDURE — 258N000003 HC RX IP 258 OP 636: Performed by: EMERGENCY MEDICINE

## 2023-12-10 PROCEDURE — 250N000011 HC RX IP 250 OP 636: Performed by: EMERGENCY MEDICINE

## 2023-12-10 PROCEDURE — 85027 COMPLETE CBC AUTOMATED: CPT | Performed by: STUDENT IN AN ORGANIZED HEALTH CARE EDUCATION/TRAINING PROGRAM

## 2023-12-10 PROCEDURE — 80053 COMPREHEN METABOLIC PANEL: CPT | Performed by: STUDENT IN AN ORGANIZED HEALTH CARE EDUCATION/TRAINING PROGRAM

## 2023-12-10 PROCEDURE — 82962 GLUCOSE BLOOD TEST: CPT

## 2023-12-10 PROCEDURE — 999N000157 HC STATISTIC RCP TIME EA 10 MIN

## 2023-12-10 PROCEDURE — 250N000013 HC RX MED GY IP 250 OP 250 PS 637: Performed by: STUDENT IN AN ORGANIZED HEALTH CARE EDUCATION/TRAINING PROGRAM

## 2023-12-10 PROCEDURE — 99233 SBSQ HOSP IP/OBS HIGH 50: CPT | Performed by: STUDENT IN AN ORGANIZED HEALTH CARE EDUCATION/TRAINING PROGRAM

## 2023-12-10 PROCEDURE — 81001 URINALYSIS AUTO W/SCOPE: CPT | Performed by: EMERGENCY MEDICINE

## 2023-12-10 PROCEDURE — 250N000012 HC RX MED GY IP 250 OP 636 PS 637: Performed by: STUDENT IN AN ORGANIZED HEALTH CARE EDUCATION/TRAINING PROGRAM

## 2023-12-10 PROCEDURE — 36415 COLL VENOUS BLD VENIPUNCTURE: CPT | Performed by: STUDENT IN AN ORGANIZED HEALTH CARE EDUCATION/TRAINING PROGRAM

## 2023-12-10 RX ORDER — AMIODARONE HYDROCHLORIDE 200 MG/1
200 TABLET ORAL DAILY
Status: DISCONTINUED | OUTPATIENT
Start: 2023-12-10 | End: 2023-12-13 | Stop reason: HOSPADM

## 2023-12-10 RX ORDER — AZITHROMYCIN 250 MG/1
250 TABLET, FILM COATED ORAL DAILY
Qty: 3 TABLET | Refills: 0 | Status: COMPLETED | OUTPATIENT
Start: 2023-12-10 | End: 2023-12-12

## 2023-12-10 RX ORDER — AMMONIUM LACTATE 12 G/100G
LOTION TOPICAL DAILY PRN
COMMUNITY
Start: 2023-03-05

## 2023-12-10 RX ORDER — PROCHLORPERAZINE MALEATE 5 MG
5 TABLET ORAL EVERY 6 HOURS PRN
Status: DISCONTINUED | OUTPATIENT
Start: 2023-12-10 | End: 2023-12-13 | Stop reason: HOSPADM

## 2023-12-10 RX ORDER — PROCHLORPERAZINE 25 MG
12.5 SUPPOSITORY, RECTAL RECTAL EVERY 12 HOURS PRN
Status: DISCONTINUED | OUTPATIENT
Start: 2023-12-10 | End: 2023-12-13 | Stop reason: HOSPADM

## 2023-12-10 RX ORDER — ACETAMINOPHEN 650 MG/1
650 SUPPOSITORY RECTAL EVERY 4 HOURS PRN
Status: DISCONTINUED | OUTPATIENT
Start: 2023-12-10 | End: 2023-12-13 | Stop reason: HOSPADM

## 2023-12-10 RX ORDER — ACETAMINOPHEN 325 MG/1
650 TABLET ORAL EVERY 4 HOURS PRN
Status: DISCONTINUED | OUTPATIENT
Start: 2023-12-10 | End: 2023-12-13 | Stop reason: HOSPADM

## 2023-12-10 RX ORDER — AMIODARONE HYDROCHLORIDE 200 MG/1
200 TABLET ORAL DAILY
COMMUNITY
Start: 2023-03-23

## 2023-12-10 RX ORDER — LIDOCAINE 40 MG/G
CREAM TOPICAL
Status: DISCONTINUED | OUTPATIENT
Start: 2023-12-10 | End: 2023-12-13 | Stop reason: HOSPADM

## 2023-12-10 RX ORDER — PREDNISONE 20 MG/1
2 TABLET ORAL DAILY
COMMUNITY
Start: 2023-06-28 | End: 2023-12-10

## 2023-12-10 RX ORDER — FEXOFENADINE HCL 180 MG/1
180 TABLET ORAL DAILY
COMMUNITY

## 2023-12-10 RX ORDER — CARBOXYMETHYLCELLULOSE SODIUM 5 MG/ML
1 SOLUTION/ DROPS OPHTHALMIC DAILY PRN
COMMUNITY
Start: 2023-10-17

## 2023-12-10 RX ORDER — ONDANSETRON 2 MG/ML
4 INJECTION INTRAMUSCULAR; INTRAVENOUS EVERY 6 HOURS PRN
Status: DISCONTINUED | OUTPATIENT
Start: 2023-12-10 | End: 2023-12-13 | Stop reason: HOSPADM

## 2023-12-10 RX ORDER — OMEGA-3/DHA/EPA/FISH OIL 60 MG-90MG
1 CAPSULE ORAL DAILY
COMMUNITY

## 2023-12-10 RX ORDER — PREDNISONE 20 MG/1
40 TABLET ORAL DAILY
Status: DISCONTINUED | OUTPATIENT
Start: 2023-12-10 | End: 2023-12-10

## 2023-12-10 RX ORDER — AZITHROMYCIN 250 MG/1
250 TABLET, FILM COATED ORAL DAILY
Status: DISCONTINUED | OUTPATIENT
Start: 2023-12-10 | End: 2023-12-10

## 2023-12-10 RX ORDER — FEXOFENADINE HCL 180 MG/1
180 TABLET ORAL DAILY
Status: DISCONTINUED | OUTPATIENT
Start: 2023-12-10 | End: 2023-12-13 | Stop reason: HOSPADM

## 2023-12-10 RX ORDER — CEFTRIAXONE 1 G/1
1 INJECTION, POWDER, FOR SOLUTION INTRAMUSCULAR; INTRAVENOUS EVERY 24 HOURS
Status: DISCONTINUED | OUTPATIENT
Start: 2023-12-10 | End: 2023-12-13 | Stop reason: HOSPADM

## 2023-12-10 RX ORDER — DEXAMETHASONE SODIUM PHOSPHATE 10 MG/ML
6 INJECTION, SOLUTION INTRAMUSCULAR; INTRAVENOUS DAILY
Qty: 10 ML | Refills: 0 | Status: DISCONTINUED | OUTPATIENT
Start: 2023-12-10 | End: 2023-12-13 | Stop reason: HOSPADM

## 2023-12-10 RX ORDER — NICOTINE POLACRILEX 4 MG
15-30 LOZENGE BUCCAL
Status: DISCONTINUED | OUTPATIENT
Start: 2023-12-10 | End: 2023-12-13 | Stop reason: HOSPADM

## 2023-12-10 RX ORDER — FUROSEMIDE 20 MG
20 TABLET ORAL DAILY
Status: DISCONTINUED | OUTPATIENT
Start: 2023-12-10 | End: 2023-12-13 | Stop reason: HOSPADM

## 2023-12-10 RX ORDER — ENOXAPARIN SODIUM 100 MG/ML
40 INJECTION SUBCUTANEOUS EVERY 24 HOURS
Status: DISCONTINUED | OUTPATIENT
Start: 2023-12-10 | End: 2023-12-10

## 2023-12-10 RX ORDER — DEXTROSE MONOHYDRATE 25 G/50ML
25-50 INJECTION, SOLUTION INTRAVENOUS
Status: DISCONTINUED | OUTPATIENT
Start: 2023-12-10 | End: 2023-12-13 | Stop reason: HOSPADM

## 2023-12-10 RX ORDER — ONDANSETRON 4 MG/1
4 TABLET, ORALLY DISINTEGRATING ORAL EVERY 6 HOURS PRN
Status: DISCONTINUED | OUTPATIENT
Start: 2023-12-10 | End: 2023-12-13 | Stop reason: HOSPADM

## 2023-12-10 RX ORDER — POLYETHYLENE GLYCOL 3350 17 G/17G
17 POWDER, FOR SOLUTION ORAL 2 TIMES DAILY PRN
Status: DISCONTINUED | OUTPATIENT
Start: 2023-12-10 | End: 2023-12-13 | Stop reason: HOSPADM

## 2023-12-10 RX ADMIN — INSULIN ASPART 2 UNITS: 100 INJECTION, SOLUTION INTRAVENOUS; SUBCUTANEOUS at 23:13

## 2023-12-10 RX ADMIN — CEFTRIAXONE SODIUM 1 G: 1 INJECTION, POWDER, FOR SOLUTION INTRAMUSCULAR; INTRAVENOUS at 21:02

## 2023-12-10 RX ADMIN — SERTRALINE HYDROCHLORIDE 50 MG: 50 TABLET ORAL at 08:58

## 2023-12-10 RX ADMIN — AMIODARONE HYDROCHLORIDE 200 MG: 200 TABLET ORAL at 08:58

## 2023-12-10 RX ADMIN — DEXAMETHASONE SODIUM PHOSPHATE 6 MG: 10 INJECTION, SOLUTION INTRAMUSCULAR; INTRAVENOUS at 11:28

## 2023-12-10 RX ADMIN — INSULIN ASPART 4 UNITS: 100 INJECTION, SOLUTION INTRAVENOUS; SUBCUTANEOUS at 17:35

## 2023-12-10 RX ADMIN — SODIUM CHLORIDE 50 ML: 9 INJECTION, SOLUTION INTRAVENOUS at 14:28

## 2023-12-10 RX ADMIN — AZITHROMYCIN DIHYDRATE 250 MG: 250 TABLET, FILM COATED ORAL at 17:37

## 2023-12-10 RX ADMIN — APIXABAN 5 MG: 5 TABLET, FILM COATED ORAL at 08:58

## 2023-12-10 RX ADMIN — INSULIN ASPART 3 UNITS: 100 INJECTION, SOLUTION INTRAVENOUS; SUBCUTANEOUS at 14:26

## 2023-12-10 RX ADMIN — APIXABAN 5 MG: 5 TABLET, FILM COATED ORAL at 19:14

## 2023-12-10 RX ADMIN — AZITHROMYCIN MONOHYDRATE 500 MG: 500 INJECTION, POWDER, LYOPHILIZED, FOR SOLUTION INTRAVENOUS at 00:49

## 2023-12-10 RX ADMIN — FEXOFENADINE HYDROCHLORIDE 180 MG: 180 TABLET ORAL at 08:58

## 2023-12-10 RX ADMIN — REMDESIVIR 200 MG: 100 INJECTION, POWDER, LYOPHILIZED, FOR SOLUTION INTRAVENOUS at 11:29

## 2023-12-10 RX ADMIN — INSULIN ASPART 1 UNITS: 100 INJECTION, SOLUTION INTRAVENOUS; SUBCUTANEOUS at 09:58

## 2023-12-10 RX ADMIN — FUROSEMIDE 20 MG: 20 TABLET ORAL at 08:58

## 2023-12-10 ASSESSMENT — ACTIVITIES OF DAILY LIVING (ADL)
ADLS_ACUITY_SCORE: 33
ADLS_ACUITY_SCORE: 28
ADLS_ACUITY_SCORE: 33
ADLS_ACUITY_SCORE: 35
ADLS_ACUITY_SCORE: 28
ADLS_ACUITY_SCORE: 33
ADLS_ACUITY_SCORE: 35
ADLS_ACUITY_SCORE: 28
ADLS_ACUITY_SCORE: 35
ADLS_ACUITY_SCORE: 33

## 2023-12-10 NOTE — PLAN OF CARE
Problem: Adult Inpatient Plan of Care  Goal: Optimal Comfort and Wellbeing  Outcome: Progressing     Problem: Risk for Delirium  Goal: Improved Attention and Thought Clarity  Outcome: Progressing  Intervention: Maximize Cognitive Function  Recent Flowsheet Documentation  Taken 12/10/2023 0830 by Britany Buchanan, RN  Sensory Stimulation Regulation:   television on   visitors limited  Reorientation Measures:   calendar in view   clock in view   glasses use encouraged   hearing device use encouraged     Problem: Infection  Goal: Absence of Infection Signs and Symptoms  Outcome: Progressing  Intervention: Prevent or Manage Infection  Recent Flowsheet Documentation  Taken 12/10/2023 0830 by Britany Buchanan, RN  Isolation Precautions: special precautions maintained   Goal Outcome Evaluation:       Patient here for Covid/ Pneumonia awaiting urine results. Getting IV remdesivir and Decadron. Afebrile. On 3L 02 sating 90-93%. Using urinal at bedside. BG elevated. A&O.

## 2023-12-10 NOTE — UTILIZATION REVIEW
Inpatient appropriate    Admission Status; Secondary Review Determination       Under the authority of the Utilization Management Committee, the utilization review process indicated a secondary review on the above patient. The review outcome is based on review of the medical records, discussions with staff, and applying clinical experience noted on the date of the review.     (x) Inpatient Status Appropriate - This patient's medical care is consistent with medical management for inpatient care and reasonable inpatient medical practice.     RATIONALE FOR DETERMINATION   89 yo M with pmhx CAD (JOANNA to RCA), A fib (eilquis), BPH, chronic low back pain, DM2, MDD, HLD; admitted for COVID with superimposed bacterial pneumonia. Pt will be continue with IV abx and closely monitor     At the time of admission with the information available to the attending physician more than 2 nights Hospital complex care was anticipated, based on patient risk of adverse outcome if treated as outpatient and complex care required. Inpatient admission is appropriate based on the Medicare guidelines.     The information on this document is developed by the utilization review team in order for the business office to ensure compliance. This only denotes the appropriateness of proper admission status and does not reflect the quality of care rendered.   The definitions of Inpatient Status and Observation Status used in making the determination above are those provided in the CMS Coverage Manual, Chapter 1 and Chapter 6, section 70.4.   Sincerely,   Morteza Flowers MD  Utilization Review  Physician Advisor  [facility].

## 2023-12-10 NOTE — MEDICATION SCRIBE - ADMISSION MEDICATION HISTORY
Medication Scribe Admission Medication History    Admission medication history is complete. The information provided in this note is only as accurate as the sources available at the time of the update.    Information Source(s): Patient via in-person    Pertinent Information: Patient report taking all of his medications on PTA med list yesterday AM.  Patient report not taking Aspirin 81 mg tablet but [facility] shows both Aspirin and Apixaban as active.      Changes made to PTA medication list:  Added: Amiodarone 200 mg tablet, Ammonium Lactate 12 % lotion, Carboxymethyl 0.05 % eye drop, Fexofenadine 180 mg tablet, Fish oil 500 mg capsule, Prednisone 20 mg tablet (per [facility] fill history and patient confirmed)   Deleted: Aspirin 81 mg tablet, Celecoxib 100 mg capsule, Glucosamine sulfate po, Ubiquinol 200 mg capsule (per patient)  Changed: None    Medication Affordability:  Not including over the counter (OTC) medications, was there a time in the past 3 months when you did not take your medications as prescribed because of cost?: No    Allergies reviewed with patient and updates made in EHR: yes    Medication History Completed By: Belem Morton 12/10/2023 12:18 AM    PTA Med List   Medication Sig Last Dose    acetaminophen (TYLENOL) 500 MG tablet Take 500-1,000 mg by mouth every 6 hours as needed for mild pain Unknown at prn    amiodarone (PACERONE) 200 MG tablet Take 200 mg by mouth daily 12/9/2023 at am    ammonium lactate (LAC-HYDRIN) 12 % external lotion Apply topically daily as needed for dry skin Unknown at prn    carboxymethylcellulose (REFRESH PLUS) 0.5 % SOLN ophthalmic solution Place 1 drop into both eyes daily as needed for dry eyes Unknown at prn    [facility] GLUCOSAMINE-CHONDROIT-MSM PO 1 tablet daily 12/9/2023 at am    ELIQUIS ANTICOAGULANT 5 MG tablet Take 5 mg by mouth 2 times daily 12/9/2023 at am    ferrous sulfate (FE TABS) 325 (65 Fe) MG EC tablet Take 325 mg by mouth daily 12/9/2023 at am     fexofenadine (ALLEGRA) 180 MG tablet Take 180 mg by mouth daily 12/9/2023 at am    fish oil-omega-3 fatty acids 500 MG capsule Take 1 capsule by mouth daily 12/9/2023 at am    Flaxseed, Linseed, (FLAX SEED OIL) 1000 MG capsule Take by mouth daily 12/9/2023 at am    furosemide (LASIX) 20 MG tablet Take 20 mg by mouth daily 12/9/2023 at am    hydrocortisone 2.5 % cream Apply topically 2 times daily as needed for itching or rash Unknown at prn    ketoconazole (NIZORAL) 2 % external shampoo Shampoo scalp 3 times weekly Past Week at unknown    lidocaine (LIDODERM) 5 % patch Place 2 patches onto the skin daily as needed for moderate pain To prevent lidocaine toxicity, patient should be patch free for 12 hrs daily. Unknown at prn    nitroGLYcerin (NITROSTAT) 0.4 MG sublingual tablet Place 0.4 mg under the tongue every 5 minutes as needed for chest pain For chest pain place 1 tablet under the tongue every 5 minutes for 3 doses. If symptoms persist 5 minutes after 1st dose call 911. Unknown at prn    olopatadine (PATANOL) 0.1 % ophthalmic solution Place 1 drop into both eyes 2 times daily prn Unknown at prn    predniSONE (DELTASONE) 20 MG tablet Take 2 tablets by mouth daily 12/9/2023 at am    sertraline (ZOLOFT) 50 MG tablet Take 50 mg by mouth daily  12/9/2023 at am    VYNDAQEL 20 MG Take 1 capsule by mouth daily 12/9/2023 at am

## 2023-12-10 NOTE — PROGRESS NOTES
Care Management Follow Up    Length of Stay (days): 0    Expected Discharge Date: 12/12/2023     Concerns to be Addressed:     none noted as of yet  Patient plan of care discussed at interdisciplinary rounds: Yes    Anticipated Discharge Disposition:  hopeful return home. No PT/OT assessment ordered at this point in time     Anticipated Discharge Services:  TBD  Anticipated Discharge DME:  TBD    Patient/family educated on Medicare website which has current facility and service quality ratings:  n/a  Education Provided on the Discharge Plan:    Patient/Family in Agreement with the Plan:      Referrals Placed by CM/SW:  none as of yet  Private pay costs discussed: Not applicable    Additional Information:  Chart reviewed - assessment not indicated per low readmission risk score. Patient is a 89 yo male who lives at home with his spouse in [place]. He has a two week history of a cough, was vaccinated recently for COVID and [facility] was diagnosed with COVID and pneumonia. It is noted he has diabetes and memory concerns at baseline. No PT/OT orders placed. CM will follow should needs     LAKESHA Hill

## 2023-12-10 NOTE — ED NOTES
"[facility] ED Handoff Report    ED Chief Complaint: generalized weakness and fever     ED Diagnosis:  (J18.9) Pneumonia of right lower lobe due to infectious organism  Plan: antibiotics     (U07.1) COVID-19 virus infection  Comment: received covid booster on Friday    (R53.1) General weakness  Comment: transfers with assist of 1  Plan: PT    (R41.82) Altered mental status, unspecified altered mental status type  Comment: no mental status issues noted in ED          PMH:  No past medical history on file.     Code Status:  Full Code     Falls Risk: Yes Band: Applied    Current Living Situation/Residence: lives with a significant other     Elimination Status: Continent: Yes     Activity Level: SBA    Patients Preferred Language:  English     Needed: No    Vital Signs:  BP (!) 158/67   Pulse 65   Temp 100.1  F (37.8  C) (Oral)   Resp 18   Ht 1.753 m (5' 9\")   Wt 83.9 kg (185 lb)   SpO2 95%   BMI 27.32 kg/m       Cardiac Rhythm: sinus, occasional PVC    Pain Score: 0/10    Is the Patient Confused:  No    Last Food or Drink: 12/10/23 - drinking water     Tests Performed: Done: Labs and Imaging    Treatments Provided:  IV antibiotics / O2 via NC / patient refused CPAP overnight     Family Dynamics/Concerns: No    Family Updated On Visitor Policy: No    Plan of Care Communicated to Family: Yes    Who Was Updated about Plan of Care: significant other and granddaughter     Belongings Checklist Done and Signed by Patient: Noclothing, jacket, shoes     Medications sent with patient: insulin pen    Covid: symptomatic, positive    Additional Information: home medication Tafamidis (Vyndaquel) is not formulary.  Will need to ask family to bring from home.    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     RN: Melodie Simon RN   12/10/2023 5:25 AM      "

## 2023-12-10 NOTE — H&P
"[facility]    History and Physical - Hospitalist Service       Date of Admission:  12/9/2023    Assessment & Plan      Familia Sales is a 89M presents with generalised weakness, cough; pmhx includes CAD (JOANAN to RCA), A fib (eilquis), BPH, chronic low back pain, DM2, MDD, HLD; admitted for COVID, superimposed bacterial pneumonia.    #COVID pneumonia  #superimposed bacterial PNA  NOT sepsis, NOT hypoxic.  -CBC trend  -Ceftriaxone 1g IV every day  -azithromycin 250mg PO every day  -NO indication for decadron or remdesivir    #atrial fibrillation  -eliquis 5mg PO BID  -amiodarone 200mg PO qD    #MDD  -sertraline 50mg PO every day    #hypercalcemia  Borderline elevation.  -CMP trend    #DM2  Likely exacerbated by home steroids.  -sliding scale insulin, likely not needed at discharge    #elevated troponin  #demand ischemia  Elevated troponin, stable. Likely demand ischemia in setting of acute illness.    #chronic pain  -Tylenol PO PRN          Diet: Combination Diet Regular Diet Adult  DVT Prophylaxis: DOAC  Kinney Catheter: Not present  Lines: None     Cardiac Monitoring: None  Code Status: Full Code    Clinically Significant Risk Factors Present on Admission               # Drug Induced Coagulation Defect: home medication list includes an anticoagulant medication         # Overweight: Estimated body mass index is 27.32 kg/m  as calculated from the following:    Height as of this encounter: 1.753 m (5' 9\").    Weight as of this encounter: 83.9 kg (185 lb).              Disposition Plan      Expected Discharge Date: 12/12/2023                  Adelfo Acharya MD  Hospitalist Service  [facility]  Securely message with [employer] (more info)  Text page via [employer] Paging/Directory     ______________________________________________________________________    Chief Complaint   Weakness, cough    History is obtained from the patient    History of Present Illness   Familia Sales is a " 89M presents with generalised weakness, cough; pmhx includes CAD (JOANNA to RCA), A fib (eilquis), BPH, chronic low back pain, DM2, MDD, HLD; admitted for COVID, superimposed bacterial pneumonia.    Presents with 1 day of weakness, chills, cough.  Found to be COVID-positive.  No dyspnea, hypoxia.  Nausea, no vomiting.      Past Medical History    No past medical history on file.    Past Surgical History   Past Surgical History:   Procedure Laterality Date    APPENDECTOMY  1950    CORONARY STENT PLACEMENT  2014    EYE SURGERY      FOOT SURGERY      HC DEST LOC LES CHOROID, PHOTOCOAG >=1 SESSION  06    LASIK    HERNIA REPAIR      HERNIA REPAIR, UMBILICAL  06    OTHER SURGICAL HISTORY      decompression l3    OTHER SURGICAL HISTORY      spinal fusion L4-L5    RELEASE CARPAL TUNNEL Right 10/29/2014    REPLACEMENT TOTAL KNEE      SURGICAL HISTORY OF -   1959    CARTILAGE REMOVED FROM NOSE    SURGICAL HISTORY OF -   2008    LUMBAR DECOMPRESSION AT L2-3 AND L3-4    SURGICAL HISTORY OF -   09 THRU 3/06/09    CORTIZONE INJECTIOSN A SERIES OF 8    SURGICAL HISTORY OF -       INJECTION L3 NERVE ENDING    [facility] ANESTH,LUMBAR SPINE,CORD SURGERY  10/28/97 & 98    L3 to sacrum with spinal fusion L4-L% and decompression L5-S1    Z ANESTH,TOTAL KNEE ARTHROPLASTY  03    with revision left 04    [facility] APPENDECTOMY  MAY 1949 OR     [facility] FOOT/TOES SURGERY PROC UNLISTED  1989       Prior to Admission Medications   Prior to Admission Medications   Prescriptions Last Dose Informant Patient Reported? Taking?   [facility] GLUCOSAMINE-CHONDROIT-MSM PO 2023 at am Self Yes Yes   Si tablet daily   ELIQUIS ANTICOAGULANT 5 MG tablet 2023 at am Self Yes Yes   Sig: Take 5 mg by mouth 2 times daily   Flaxseed, Linseed, (FLAX SEED OIL) 1000 MG capsule 2023 at am Self Yes Yes   Sig: Take by mouth daily   VYNDAQEL 20 MG 2023 at am Self Yes Yes   Sig: Take 1 capsule by mouth  daily   acetaminophen (TYLENOL) 500 MG tablet Unknown at prn Self Yes Yes   Sig: Take 500-1,000 mg by mouth every 6 hours as needed for mild pain   amiodarone (PACERONE) 200 MG tablet 12/9/2023 at am Self Yes Yes   Sig: Take 200 mg by mouth daily   ammonium lactate (LAC-HYDRIN) 12 % external lotion Unknown at prn Self Yes Yes   Sig: Apply topically daily as needed for dry skin   carboxymethylcellulose (REFRESH PLUS) 0.5 % SOLN ophthalmic solution Unknown at prn Self Yes Yes   Sig: Place 1 drop into both eyes daily as needed for dry eyes   ferrous sulfate (FE TABS) 325 (65 Fe) MG EC tablet 12/9/2023 at am Self Yes Yes   Sig: Take 325 mg by mouth daily   fexofenadine (ALLEGRA) 180 MG tablet 12/9/2023 at am Self Yes Yes   Sig: Take 180 mg by mouth daily   fish oil-omega-3 fatty acids 500 MG capsule 12/9/2023 at am Self Yes Yes   Sig: Take 1 capsule by mouth daily   furosemide (LASIX) 20 MG tablet 12/9/2023 at am Self Yes Yes   Sig: Take 20 mg by mouth daily   hydrocortisone 2.5 % cream Unknown at prn Self Yes Yes   Sig: Apply topically 2 times daily as needed for itching or rash   ketoconazole (NIZORAL) 2 % external shampoo Past Week at unknown Self Yes Yes   Sig: Shampoo scalp 3 times weekly   lidocaine (LIDODERM) 5 % patch Unknown at prn Self Yes Yes   Sig: Place 2 patches onto the skin daily as needed for moderate pain To prevent lidocaine toxicity, patient should be patch free for 12 hrs daily.   nitroGLYcerin (NITROSTAT) 0.4 MG sublingual tablet Unknown at prn Self Yes Yes   Sig: Place 0.4 mg under the tongue every 5 minutes as needed for chest pain For chest pain place 1 tablet under the tongue every 5 minutes for 3 doses. If symptoms persist 5 minutes after 1st dose call 911.   olopatadine (PATANOL) 0.1 % ophthalmic solution Unknown at prn Self Yes Yes   Sig: Place 1 drop into both eyes 2 times daily prn   predniSONE (DELTASONE) 20 MG tablet 12/9/2023 at am Self Yes Yes   Sig: Take 2 tablets by mouth daily    sertraline (ZOLOFT) 50 MG tablet 12/9/2023 at am Self Yes Yes   Sig: Take 50 mg by mouth daily       Facility-Administered Medications: None        Review of Systems    The 10 point Review of Systems is negative other than noted in the HPI or here.      Physical Exam   Vital Signs: Temp: 100.1  F (37.8  C) Temp src: Oral BP: 115/58 Pulse: 58   Resp: 20 SpO2: 97 %      Weight: 185 lbs 0 oz    Constitutional: awake, alert, cooperative, no apparent distress, and appears stated age  Cardiovascular: CTAB  GI: soft, nontender, nondistended  Musculoskeletal: shoulder/elbow flexion/extension 5/5 bilaterally and symmetric  Neurologic: AOx4, CN II-XII intact    Medical Decision Making       45 MINUTES SPENT BY ME on the date of service doing chart review, history, exam, documentation & further activities per the note.  MANAGEMENT DISCUSSED with the following over the past 24 hours: patient   NOTE(S)/MEDICAL RECORDS REVIEWED over the past 24 hours: [facility] continuity care documents  Tests ORDERED & REVIEWED in the past 24 hours:  - See lab/imaging results included in the data section of the note      Data     I have personally reviewed the following data over the past 24 hrs:    6.2  \   13.3   / 153     137 103 17.8 /  205 (H)   4.3 23 0.95 \     Trop: 53 (H) BNP: N/A     Procal: N/A CRP: N/A Lactic Acid: 1.0         Imaging results reviewed over the past 24 hrs:   Recent Results (from the past 24 hour(s))   Head CT w/o contrast    Narrative    EXAM: CT HEAD W/O CONTRAST  LOCATION: [facility]  DATE: 12/9/2023    INDICATION: Altered mental status. Fever. Confusion.  COMPARISON: CT head dated 09/06/2023.  TECHNIQUE: Routine CT Head without IV contrast. Multiplanar reformats. Dose reduction techniques were used.    FINDINGS:  INTRACRANIAL CONTENTS: No intracranial hemorrhage, extraaxial collection, or mass effect.  No CT evidence of acute infarct. Moderate presumed chronic small vessel ischemic changes.  Mild to moderate generalized volume loss. No hydrocephalus. Note is made   of coarse atherosclerotic calcifications of the intracranial vasculature.     VISUALIZED ORBITS/SINUSES/MASTOIDS: Prior bilateral cataract surgery. Visualized portions of the orbits are otherwise unremarkable. Mild mucosal thickening scattered about the paranasal sinuses. No middle ear or mastoid effusion.    BONES/SOFT TISSUES: No acute abnormality.      Impression    IMPRESSION:  1.  No CT evidence for acute intracranial process.  2.  Brain atrophy and presumed chronic microvascular ischemic changes as above.   XR Chest 2 Views    Narrative    EXAM: XR CHEST 2 VIEWS  LOCATION: [facility]  DATE: 12/9/2023    INDICATION: fever and cough  COMPARISON: 09/06/2023      Impression    IMPRESSION: Hazy opacity at the right lung base, new from prior and suspicious for developing infectious infiltrate in setting of fever. Stable mildly enlarged cardiac silhouette. No pulmonary vascular congestion. Possible trace pleural effusions. No   pneumothorax. Spinal stimulator leads project over the midthoracic spine. Degenerative changes of the shoulders and thoracic spine.

## 2023-12-10 NOTE — ED TRIAGE NOTES
Patient presents to ED for generalized weakness and fever that began this morning.  Patient states he received covid shot yesterday.  Family states patient has had intermittent confusion through the day.  Denies any pain currently.  No new shortness of breath.

## 2023-12-10 NOTE — ED PROVIDER NOTES
EMERGENCY DEPARTMENT ENCOUNTER      NAME: Familia Sales  AGE: 89 year old male  YOB: 1931  MRN: [id number]  EVALUATION DATE & TIME: No admission date for patient encounter.    PCP: Josefa Saleh    ED PROVIDER: Stevan Trent M.D.      Chief Complaint   Patient presents with    Generalized Weakness    Fever         FINAL IMPRESSION:  1.  Acute altered mental status, resolved.  2.  Acute fever.  3.  Acute generalized weakness.  4.  Acute right lower lobe pneumonia.  5.  Acute cough COVID positive.    ED COURSE & MEDICAL DECISION MAKIN:40 PM I met with the patient to gather history and to perform my initial exam. We discussed plans for the ED course, including diagnostic testing and treatment. PPE worn: cloth mask. His wife and a family member are at bedside.  Symptoms beginning today fever to 100.6 at home.  Currently 100.1 degrees.  Patient has had a cough for the last several weeks.  He noted it was yellowish to orange phlegm.  No other complaints of possible sources.  Family notes earlier confusion has resolved.  11:11 PM Rechecked and updated patient and family.  EKG unremarkable.  Head CT unremarkable.  Chest x-ray with probable right lower lobe pneumonia.  Blood culture sent.  Lactate normal.  Urinalysis still pending.  Chemistries negative other than sugar 205.  Troponin 54.  Delta troponin pending.  CBC negative.  Flu and are recent RSV testing negative.  COVID testing positive.  We did order Rocephin and azithromycin to cover community pneumonia.  Patient and family in agreement and we will discuss this with the admitting service.  12:05 AM.  Hospitalist in agreement with [facility].      Pertinent Labs & Imaging studies reviewed. (See chart for details)  89 year old male presents to the Emergency Department for evaluation of fever, confusion, and weakness.    At the conclusion of the encounter I discussed the results of all of the tests and the disposition. The questions were  answered. The patient or family acknowledged understanding and was agreeable with the care plan.              Medical Decision Making    History:  Supplemental history from: Family Member/Significant Other  External Record(s) reviewed: Outpatient Record: cardiac event monitoring from 09/11/2023-10/10/2023 with [facility]    Work Up:  Chart documentation includes differential considered and any EKGs or imaging independently interpreted by provider, where specified.  Differential diagnosis includes pneumonia, urinary tract infection, stroke, etc. in additional to work up documented, I considered the following work up: Documented in chart, if applicable.    External consultation:  Discussion of management with another provider: Documented in chart, if applicable    Complicating factors:  Care impacted by chronic illness: Anticoagulated State, Diabetes, Hyperlipidemia, and Mental Health  Care affected by social determinants of health: N/A    Disposition considerations: Patient may well require admission after evaluation.          MEDICATIONS GIVEN IN THE EMERGENCY:  Medications   cefTRIAXone (ROCEPHIN) 1 g vial to attach to  mL bag for ADULTS or NS 50 mL bag for PEDS (has no administration in time range)   azithromycin (ZITHROMAX) 500 mg in sodium chloride 0.9 % 250 mL intermittent infusion (has no administration in time range)   sodium chloride 0.9% BOLUS 500 mL (0 mLs Intravenous Stopped 12/9/23 5806)       NEW PRESCRIPTIONS STARTED AT TODAY'S ER VISIT  New Prescriptions    No medications on file          =================================================================    HPI    Patient information was obtained from: Patient, wife, family member.    Use of : N/A         Familia Sales is a 89 year old male with a pertinent history of coronary artery disease, acute MI in 2021, hyperlipidemia, degenerative spinal arthritis, type 2 diabetes mellitus, and memory difficulties  who presents to this ED by private car for evaluation of generalized weakness and fever.    The patient reports fever today of 100.6 degrees. He reports cough productive of orange sputum.     Per his wife, he received a vaccination yesterday. She states that he slept all day today and was confused and not answering questions when he was awake. She reports faint heartbeat while he was sleeping. His wife states that he was even falling asleep while eating dinner. The patient's family member states that he seems better now in terms of confusion.    The patient denies new leg pain, abdominal pain, chest pain, rash, neck pain, and back pain.    He does not identify any waxing or waning symptoms otherwise, exacerbating or alleviating features, associated symptoms except as mentioned. He denies any pain related complaints.    Per chart review, the patient was seen at [facility] ED on 09/06/2023 (~3 months ago) for evaluation of fall and head injury. The patient reported falling in a parking lot and hitting his head. CT scan of head, face, C-spine negative. Chest x-ray negative. Patient was discharged in stable condition.    The patient had cardiac event monitoring from 09/11/2023-10/10/2023 with [facility]. No evidence of atrial fibrillation or rhythm disturbances. Patient was directed to follow up in one year.    REVIEW OF SYSTEMS   Review of Systems   Constitutional:  Positive for fever (100.6).   Respiratory:  Positive for cough (productive of orange sputum).    Cardiovascular:  Negative for chest pain.        Positive for faint heart beat per wife   Gastrointestinal:  Negative for abdominal pain.   Musculoskeletal:  Negative for back pain and neck pain.        Negative for leg pain   Skin:  Negative for rash.   Psychiatric/Behavioral:  Positive for confusion.         PAST MEDICAL HISTORY:  No past medical history on file.    PAST SURGICAL HISTORY:  Past Surgical  History:   Procedure Laterality Date    APPENDECTOMY  1950    CORONARY STENT PLACEMENT  4/30/2014    EYE SURGERY      FOOT SURGERY      HC DEST LOC LES CHOROID, PHOTOCOAG >=1 SESSION  12/13/06    LASIK    HERNIA REPAIR      HERNIA REPAIR, UMBILICAL  09/19/06    OTHER SURGICAL HISTORY      decompression l3    OTHER SURGICAL HISTORY      spinal fusion L4-L5    RELEASE CARPAL TUNNEL Right 10/29/2014    REPLACEMENT TOTAL KNEE      SURGICAL HISTORY OF -   APRIL 1959    CARTILAGE REMOVED FROM NOSE    SURGICAL HISTORY OF -   08/27/2008    LUMBAR DECOMPRESSION AT L2-3 AND L3-4    SURGICAL HISTORY OF -   1/20/09 THRU 3/06/09    CORTIZONE INJECTIOSN A SERIES OF 8    SURGICAL HISTORY OF -   8/09    INJECTION L3 NERVE ENDING    [facility] ANESTH,LUMBAR SPINE,CORD SURGERY  10/28/97 & 09/21/98    L3 to sacrum with spinal fusion L4-L% and decompression L5-S1    [facility] ANESTH,TOTAL KNEE ARTHROPLASTY  04/16/03    with revision left 08/02/04    [facility] APPENDECTOMY  MAY 1949 OR 1950    [facility] FOOT/TOES SURGERY PROC UNLISTED  MARCH 1989           CURRENT MEDICATIONS:    acetaminophen (TYLENOL) 500 MG tablet  amoxicillin (AMOXIL) 500 MG capsule  aspirin 81 MG EC tablet  celecoxib (CELEBREX) 100 MG capsule  [employer] GLUCOSAMINE-CHONDROIT-MSM PO  ELIQUIS ANTICOAGULANT 5 MG tablet  ferrous sulfate (FE TABS) 325 (65 Fe) MG EC tablet  Flaxseed, Linseed, (FLAX SEED OIL) 1000 MG capsule  furosemide (LASIX) 20 MG tablet  GLUCOSAMINE SULFATE PO  hydrocortisone 2.5 % cream  ketoconazole (NIZORAL) 2 % external shampoo  lidocaine (LIDODERM) 5 % patch  nitroGLYcerin (NITROSTAT) 0.4 MG sublingual tablet  olopatadine (PATANOL) 0.1 % ophthalmic solution  sertraline (ZOLOFT) 50 MG tablet  Ubiquinol 200 MG CAPS  VYNDAQEL 20 MG        ALLERGIES:  Allergies   Allergen Reactions    Codeine     Duloxetine     Dye [Contrast Dye]      ONE REACTION TO INJECTED DYE IN SHOULDER, ABOUT MAY 1965.    Morphine     Morphine And Related Itching    Motrin [Ibuprofen Micronized] Itching     "Tagamet Itching    Vicodin [Hydrocodone-Acetaminophen]      EXTREME SWEATING, FLUSHING AND LIGHT-HEADEDNESS.      Hay Fever & [A.R.M.]        FAMILY HISTORY:  Family History   Problem Relation Age of Onset    Gastrointestinal Disease Mother         ulcers    Cancer Mother     Cancer Maternal Grandfather     Depression Daughter     Liver Disease Daughter     Diabetes Daughter     Cancer Father        SOCIAL HISTORY:   Social History     Socioeconomic History    Marital status:    Tobacco Use    Smoking status: Former     Packs/day: 1.00     Years: 24.00     Additional pack years: 0.00     Total pack years: 24.00     Types: Cigarettes     Quit date: 1964     Years since quittin.9    Smokeless tobacco: Never   Substance and Sexual Activity    Alcohol use: Yes     Comment: very rare    Drug use: No    Sexual activity: Not Currently     Partners: Female   Former smoker.  No current drugs or tobacco.  Occasional alcohol.    VITALS:  BP (!) 145/69   Pulse 65   Temp 100.1  F (37.8  C) (Oral)   Resp 17   Ht 1.753 m (5' 9\")   Wt 83.9 kg (185 lb)   SpO2 95%   BMI 27.32 kg/m      PHYSICAL EXAM    Vital Signs:  BP (!) 145/69   Pulse 65   Temp 100.1  F (37.8  C) (Oral)   Resp 17   Ht 1.753 m (5' 9\")   Wt 83.9 kg (185 lb)   SpO2 95%   BMI 27.32 kg/m    General:  On entering the room he is in no apparent distress.    Neck:  Neck supple with full range of motion and nontender.    Back:  Back and spine are nontender.  No costovertebral angle tenderness.    HEENT:  Oropharynx clear with moist mucous membranes.  HEENT unremarkable.    Pulmonary:  Chest clear to auscultation without rhonchi rales or wheezing.    Cardiovascular:  Cardiac regular rate and rhythm without murmurs rubs or gallops.    Abdomen:  Abdomen soft nontender.  There is no rebound or guarding.    Muskuloskeletal:  He moves all 4 without any difficulty and has normal neurovascular exams.  Extremities without clubbing, cyanosis, or edema.  " Legs and calves are nontender.    Neuro:  He is alert and oriented ×3 and moves all extremities symmetrically.    Psych:  Normal affect.    Skin:  Unremarkable and warm and dry.       LAB:  All pertinent labs reviewed and interpreted.  Labs Ordered and Resulted from Time of ED Arrival to Time of ED Departure   BASIC METABOLIC PANEL - Abnormal       Result Value    Sodium 137      Potassium 4.3      Chloride 103      Carbon Dioxide (CO2) 23      Anion Gap 11      Urea Nitrogen 17.8      Creatinine 0.95      GFR Estimate 75      Calcium 10.1 (*)     Glucose 205 (*)    TROPONIN T, HIGH SENSITIVITY - Abnormal    Troponin T, High Sensitivity 54 (*)    INFLUENZA A/B, RSV, & SARS-COV2 PCR - Abnormal    Influenza A PCR Negative      Influenza B PCR Negative      RSV PCR Negative      SARS CoV2 PCR Positive (*)    CBC WITH PLATELETS AND DIFFERENTIAL - Abnormal    WBC Count 6.2      RBC Count 4.31 (*)     Hemoglobin 13.3      Hematocrit 39.6 (*)     MCV 92      MCH 30.9      MCHC 33.6      RDW 14.1      Platelet Count 153      % Neutrophils 76      % Lymphocytes 10      % Monocytes 13      % Eosinophils 0      % Basophils 0      % Immature Granulocytes 1      NRBCs per 100 WBC 0      Absolute Neutrophils 4.7      Absolute Lymphocytes 0.6 (*)     Absolute Monocytes 0.8      Absolute Eosinophils 0.0      Absolute Basophils 0.0      Absolute Immature Granulocytes 0.0      Absolute NRBCs 0.0     LACTIC ACID WHOLE BLOOD - Normal    Lactic Acid 1.0     ROUTINE UA WITH MICROSCOPIC REFLEX TO CULTURE   TROPONIN T, HIGH SENSITIVITY   BLOOD CULTURE   BLOOD CULTURE       RADIOLOGY:  Reviewed all pertinent imaging. Please see official radiology report.  XR Chest 2 Views   Final Result   IMPRESSION: Hazy opacity at the right lung base, new from prior and suspicious for developing infectious infiltrate in setting of fever. Stable mildly enlarged cardiac silhouette. No pulmonary vascular congestion. Possible trace pleural effusions. No     pneumothorax. Spinal stimulator leads project over the midthoracic spine. Degenerative changes of the shoulders and thoracic spine.      Head CT w/o contrast   Final Result   IMPRESSION:   1.  No CT evidence for acute intracranial process.   2.  Brain atrophy and presumed chronic microvascular ischemic changes as above.                 EKG:    Normal sinus rhythm with sinus rhythm at 72, first-degree block, left axis deviation, poor R wave progression versus possible old anterior wall MI.  No acute changes.  Similar to previous EKG of November 2019.    I have independently reviewed and interpreted the EKG(s) documented above.    PROCEDURES:         I, Subhash Chávez, am serving as a scribe to document services personally performed by Dr. Trent based on my observation and the provider's statements to me. I, Stevan Trent MD attest that Subhash Chávez is acting in a scribe capacity, has observed my performance of the services and has documented them in accordance with my direction.    Stevan Trent M.D.  Emergency Medicine  [facility] EMERGENCY DEPARTMENT  [facility] [facility] [address]  [phone]  Dept: [phone]       Stevan Trent MD  12/09/23 2318       Stevan Trent MD  12/10/23 0005

## 2023-12-11 LAB
ALBUMIN SERPL BCG-MCNC: 3.8 G/DL (ref 3.5–5.2)
ALP SERPL-CCNC: 80 U/L (ref 40–150)
ALT SERPL W P-5'-P-CCNC: 35 U/L (ref 0–70)
ANION GAP SERPL CALCULATED.3IONS-SCNC: 8 MMOL/L (ref 7–15)
AST SERPL W P-5'-P-CCNC: 25 U/L (ref 0–45)
BILIRUB SERPL-MCNC: 0.5 MG/DL
BUN SERPL-MCNC: 16.1 MG/DL (ref 8–23)
CALCIUM SERPL-MCNC: 10 MG/DL (ref 8.2–9.6)
CHLORIDE SERPL-SCNC: 105 MMOL/L (ref 98–107)
CREAT SERPL-MCNC: 0.84 MG/DL (ref 0.67–1.17)
DEPRECATED HCO3 PLAS-SCNC: 26 MMOL/L (ref 22–29)
EGFRCR SERPLBLD CKD-EPI 2021: 82 ML/MIN/1.73M2
GLUCOSE BLDC GLUCOMTR-MCNC: 196 MG/DL (ref 70–99)
GLUCOSE BLDC GLUCOMTR-MCNC: 230 MG/DL (ref 70–99)
GLUCOSE BLDC GLUCOMTR-MCNC: 273 MG/DL (ref 70–99)
GLUCOSE BLDC GLUCOMTR-MCNC: 293 MG/DL (ref 70–99)
GLUCOSE BLDC GLUCOMTR-MCNC: 340 MG/DL (ref 70–99)
GLUCOSE SERPL-MCNC: 211 MG/DL (ref 70–99)
MAGNESIUM SERPL-MCNC: 2 MG/DL (ref 1.7–2.3)
PHOSPHATE SERPL-MCNC: 3.3 MG/DL (ref 2.5–4.5)
POTASSIUM SERPL-SCNC: 4.2 MMOL/L (ref 3.4–5.3)
PROT SERPL-MCNC: 6.3 G/DL (ref 6.4–8.3)
PTH-INTACT SERPL-MCNC: 66 PG/ML (ref 15–65)
SODIUM SERPL-SCNC: 139 MMOL/L (ref 135–145)
WBC # BLD AUTO: 4.5 10E3/UL (ref 4–11)

## 2023-12-11 PROCEDURE — 80053 COMPREHEN METABOLIC PANEL: CPT | Performed by: STUDENT IN AN ORGANIZED HEALTH CARE EDUCATION/TRAINING PROGRAM

## 2023-12-11 PROCEDURE — 250N000013 HC RX MED GY IP 250 OP 250 PS 637: Performed by: STUDENT IN AN ORGANIZED HEALTH CARE EDUCATION/TRAINING PROGRAM

## 2023-12-11 PROCEDURE — 120N000001 HC R&B MED SURG/OB

## 2023-12-11 PROCEDURE — 250N000012 HC RX MED GY IP 250 OP 636 PS 637: Performed by: STUDENT IN AN ORGANIZED HEALTH CARE EDUCATION/TRAINING PROGRAM

## 2023-12-11 PROCEDURE — 84100 ASSAY OF PHOSPHORUS: CPT | Performed by: STUDENT IN AN ORGANIZED HEALTH CARE EDUCATION/TRAINING PROGRAM

## 2023-12-11 PROCEDURE — 258N000003 HC RX IP 258 OP 636: Performed by: STUDENT IN AN ORGANIZED HEALTH CARE EDUCATION/TRAINING PROGRAM

## 2023-12-11 PROCEDURE — 83970 ASSAY OF PARATHORMONE: CPT | Performed by: STUDENT IN AN ORGANIZED HEALTH CARE EDUCATION/TRAINING PROGRAM

## 2023-12-11 PROCEDURE — 250N000011 HC RX IP 250 OP 636: Performed by: STUDENT IN AN ORGANIZED HEALTH CARE EDUCATION/TRAINING PROGRAM

## 2023-12-11 PROCEDURE — 99233 SBSQ HOSP IP/OBS HIGH 50: CPT | Performed by: STUDENT IN AN ORGANIZED HEALTH CARE EDUCATION/TRAINING PROGRAM

## 2023-12-11 PROCEDURE — 85048 AUTOMATED LEUKOCYTE COUNT: CPT | Performed by: STUDENT IN AN ORGANIZED HEALTH CARE EDUCATION/TRAINING PROGRAM

## 2023-12-11 PROCEDURE — 36415 COLL VENOUS BLD VENIPUNCTURE: CPT | Performed by: STUDENT IN AN ORGANIZED HEALTH CARE EDUCATION/TRAINING PROGRAM

## 2023-12-11 PROCEDURE — 250N000011 HC RX IP 250 OP 636: Mod: JZ | Performed by: STUDENT IN AN ORGANIZED HEALTH CARE EDUCATION/TRAINING PROGRAM

## 2023-12-11 PROCEDURE — 83735 ASSAY OF MAGNESIUM: CPT | Performed by: STUDENT IN AN ORGANIZED HEALTH CARE EDUCATION/TRAINING PROGRAM

## 2023-12-11 RX ORDER — POLYETHYLENE GLYCOL 3350 17 G/17G
17 POWDER, FOR SOLUTION ORAL DAILY
Status: DISCONTINUED | OUTPATIENT
Start: 2023-12-11 | End: 2023-12-11

## 2023-12-11 RX ORDER — POLYETHYLENE GLYCOL 3350 17 G/17G
17 POWDER, FOR SOLUTION ORAL ONCE
Qty: 1 PACKET | Refills: 0 | Status: COMPLETED | OUTPATIENT
Start: 2023-12-11 | End: 2023-12-11

## 2023-12-11 RX ADMIN — REMDESIVIR 100 MG: 100 INJECTION, POWDER, LYOPHILIZED, FOR SOLUTION INTRAVENOUS at 09:06

## 2023-12-11 RX ADMIN — INSULIN ASPART 5 UNITS: 100 INJECTION, SOLUTION INTRAVENOUS; SUBCUTANEOUS at 13:22

## 2023-12-11 RX ADMIN — CEFTRIAXONE SODIUM 1 G: 1 INJECTION, POWDER, FOR SOLUTION INTRAMUSCULAR; INTRAVENOUS at 21:15

## 2023-12-11 RX ADMIN — FUROSEMIDE 20 MG: 20 TABLET ORAL at 09:06

## 2023-12-11 RX ADMIN — APIXABAN 5 MG: 5 TABLET, FILM COATED ORAL at 09:06

## 2023-12-11 RX ADMIN — INSULIN GLARGINE 5 UNITS: 100 INJECTION, SOLUTION SUBCUTANEOUS at 21:14

## 2023-12-11 RX ADMIN — INSULIN ASPART 2 UNITS: 100 INJECTION, SOLUTION INTRAVENOUS; SUBCUTANEOUS at 09:04

## 2023-12-11 RX ADMIN — INSULIN ASPART 4 UNITS: 100 INJECTION, SOLUTION INTRAVENOUS; SUBCUTANEOUS at 21:47

## 2023-12-11 RX ADMIN — SERTRALINE HYDROCHLORIDE 50 MG: 50 TABLET ORAL at 09:06

## 2023-12-11 RX ADMIN — APIXABAN 5 MG: 5 TABLET, FILM COATED ORAL at 21:15

## 2023-12-11 RX ADMIN — FEXOFENADINE HYDROCHLORIDE 180 MG: 180 TABLET ORAL at 09:06

## 2023-12-11 RX ADMIN — AZITHROMYCIN DIHYDRATE 250 MG: 250 TABLET, FILM COATED ORAL at 17:36

## 2023-12-11 RX ADMIN — INSULIN ASPART 3 UNITS: 100 INJECTION, SOLUTION INTRAVENOUS; SUBCUTANEOUS at 17:37

## 2023-12-11 RX ADMIN — SODIUM CHLORIDE 50 ML: 9 INJECTION, SOLUTION INTRAVENOUS at 09:07

## 2023-12-11 RX ADMIN — DEXAMETHASONE SODIUM PHOSPHATE 6 MG: 10 INJECTION, SOLUTION INTRAMUSCULAR; INTRAVENOUS at 09:05

## 2023-12-11 RX ADMIN — AMIODARONE HYDROCHLORIDE 200 MG: 200 TABLET ORAL at 09:06

## 2023-12-11 ASSESSMENT — ACTIVITIES OF DAILY LIVING (ADL)
ADLS_ACUITY_SCORE: 32
ADLS_ACUITY_SCORE: 32
ADLS_ACUITY_SCORE: 33
ADLS_ACUITY_SCORE: 32
ADLS_ACUITY_SCORE: 33
ADLS_ACUITY_SCORE: 32
ADLS_ACUITY_SCORE: 32
ADLS_ACUITY_SCORE: 33
ADLS_ACUITY_SCORE: 33
ADLS_ACUITY_SCORE: 32
ADLS_ACUITY_SCORE: 33
ADLS_ACUITY_SCORE: 33

## 2023-12-11 NOTE — PLAN OF CARE
Problem: Adult Inpatient Plan of Care  Goal: Optimal Comfort and Wellbeing  Outcome: Progressing   Goal Outcome Evaluation:  Pt had an uneventful shift.  Reports he did get some good sleep.  Denies pain.  Voiding in large amounts using urinal.  O2 at 2 liters overnight with sats in the mid 90's.  Lung sounds diminished.  No respiratory distress though reports some shortness of breath with activity.                         Ok to give work note

## 2023-12-11 NOTE — PROGRESS NOTES
"Care Management Follow Up    Length of Stay (days): 1    Expected Discharge Date: 12/12/2023     Concerns to be Addressed:  Care progression     On O2 at 2L, Continue IV ceftriaxone and azithromycin, Started remdesivir and IV dexamethasone, Blood cultures pending,    Patient plan of care discussed at interdisciplinary rounds: Yes    Anticipated Discharge Disposition:  TBD     Anticipated Discharge Services:  TBD  Anticipated Discharge DME:  NA    Patient/family educated on Medicare website which has current facility and service quality ratings:  NA  Education Provided on the Discharge Plan:  Yes per team  Patient/Family in Agreement with the Plan:  NA    Referrals Placed by CM/SW:  NA  Private pay costs discussed: Not applicable    Additional Information:  Chart reviewed.    Social Hx: \"COVID +. Lives at home with spouse, normally independent. No CM needs indicated at this time. Follow for needs.\"    RNCM to follow for medical progression, recommendations, and final discharge plan.     Kaylee Santamaria RN     Per provider, Dr. Jackson, patient is not ready  "

## 2023-12-11 NOTE — PROGRESS NOTES
"[facility]    Medicine Progress Note - Hospitalist Service    Date of Admission:  12/9/2023    Assessment & Plan   Familia Sales is a 89M presents with generalised weakness, cough; pmhx includes CAD (JOANNA to RCA), A fib (eilquis), BPH, chronic low back pain, DM2, MDD, HLD; admitted for COVID, superimposed bacterial pneumonia.     # Acute Hypoxic respiratory failure 2/2 COVID pneumonia  #superimposed bacterial PNA  -CBC trend  -Continue IV ceftriaxone and azithromycin  -Started remdesivir and IV dexamethasone as patient is hypoxic and now requiring oxygen supplementation  -Blood cultures 12/09: NGTD  -Will wean oxygen as tolerated     #atrial fibrillation  -eliquis 5mg PO BID  -amiodarone 200mg PO qD     #MDD  -sertraline 50mg PO every day     #hypercalcemia  Borderline elevation.  Mag, Phos normal  PTH pending     #DM2  HbA1c 12/23 8.2  Likely exacerbated by dexamethasone  -sliding scale insulin, Lantus 5 units and I:C 1:15  Likely will not need on discharge     #elevated troponin  #demand ischemia  Elevated troponin, stable. Likely demand ischemia in setting of acute illness.     #chronic pain  -Tylenol PO PRN    -- PT/OT eval for weakness and imbalance          Diet: Combination Diet Regular Diet Adult    DVT Prophylaxis: DOAC  Kinney Catheter: Not present  Lines: None     Cardiac Monitoring: None  Code Status: Full Code      Clinically Significant Risk Factors                        # DMII: A1C = 8.2 % (Ref range: <5.7 %) within past 6 months, PRESENT ON ADMISSION  # Overweight: Estimated body mass index is 27.32 kg/m  as calculated from the following:    Height as of this encounter: 1.753 m (5' 9\").    Weight as of this encounter: 83.9 kg (185 lb)., PRESENT ON ADMISSION            Disposition Plan      Expected Discharge Date: 12/12/2023    Discharge Delays: IV Medication - consider oral or Home Infusion                Hanane Jackson MD  Hospitalist Service  [facility]  Securely message with Gwendolyn (more info)  Text page via [facility] Paging/Directory   ______________________________________________________________________    Interval History   Patient was examined at the bedside, denies any new complaints  Is on 2 L nasal cannula will wean as tolerated, MiraLAX as patient did not have a bowel movement.  Patient also mentions that he has been feeling weak and having balance problems lately, PT OT eval pending    Physical Exam   Vital Signs: Temp: 97.4  F (36.3  C) Temp src: Oral BP: 131/63 Pulse: 90   Resp: 20 SpO2: 95 % O2 Device: Nasal cannula Oxygen Delivery: 2 LPM  Weight: 185 lbs 0 oz    Constitutional: awake, alert, cooperative, no apparent distress, and appears stated age, on 2L NC   Cardiovascular: CTAB  GI: soft, nontender, nondistended  Musculoskeletal: shoulder/elbow flexion/extension 5/5 bilaterally and symmetric  Neurologic: AOx4, CN II-XII intact    Medical Decision Making       35 MINUTES SPENT BY ME on the date of service doing chart review, history, exam, documentation & further activities per the note.      Data     I have personally reviewed the following data over the past 24 hrs:    4.5  \   N/A   / N/A     139 105 16.1 /  273 (H)   4.2 26 0.84 \     ALT: 35 AST: 25 AP: 80 TBILI: 0.5   ALB: 3.8 TOT PROTEIN: 6.3 (L) LIPASE: N/A     TSH: N/A T4: N/A A1C: N/A       Imaging results reviewed over the past 24 hrs:   No results found for this or any previous visit (from the past 24 hour(s)).

## 2023-12-11 NOTE — PLAN OF CARE
"  Problem: Adult Inpatient Plan of Care  Goal: Plan of Care Review  Description: The Plan of Care Review/Shift note should be completed every shift.  The Outcome Evaluation is a brief statement about your assessment that the patient is improving, declining, or no change.  This information will be displayed automatically on your shift  note.  Outcome: Progressing  Flowsheets (Taken 12/11/2023 1137)  Plan of Care Reviewed With: patient  Goal: Patient-Specific Goal (Individualized)  Description: You can add care plan individualizations to a care plan. Examples of Individualization might be:  \"Parent requests to be called daily at 9am for status\", \"I have a hard time hearing out of my right ear\", or \"Do not touch me to wake me up as it startles  me\".  Outcome: Progressing  Goal: Absence of Hospital-Acquired Illness or Injury  Outcome: Progressing  Intervention: Identify and Manage Fall Risk  Recent Flowsheet Documentation  Taken 12/11/2023 0837 by Familia Moore RN  Safety Promotion/Fall Prevention: activity supervised  Intervention: Prevent Skin Injury  Recent Flowsheet Documentation  Taken 12/11/2023 0837 by Familia Moore RN  Body Position: position maintained  Intervention: Prevent Infection  Recent Flowsheet Documentation  Taken 12/11/2023 0837 by Familia Moore RN  Infection Prevention:   hand hygiene promoted   rest/sleep promoted  Goal: Optimal Comfort and Wellbeing  Outcome: Progressing  Goal: Readiness for Transition of Care  Outcome: Progressing     Problem: Risk for Delirium  Goal: Optimal Coping  Outcome: Progressing  Intervention: Optimize Psychosocial Adjustment to Delirium  Recent Flowsheet Documentation  Taken 12/11/2023 0837 by Familia Moore RN  Supportive Measures: active listening utilized  Goal: Improved Behavioral Control  Outcome: Progressing  Intervention: Minimize Safety Risk  Recent Flowsheet Documentation  Taken 12/11/2023 0837 by Familia Moore RN  Communication Enhancement Strategies: call " light answered in person  Goal: Improved Attention and Thought Clarity  Outcome: Progressing  Intervention: Maximize Cognitive Function  Recent Flowsheet Documentation  Taken 12/11/2023 0837 by Familia Moore RN  Reorientation Measures: clock in view  Goal: Improved Sleep  Outcome: Progressing     Problem: Infection  Goal: Absence of Infection Signs and Symptoms  Outcome: Progressing  Intervention: Prevent or Manage Infection  Recent Flowsheet Documentation  Taken 12/11/2023 0837 by Familia Moore, RN  Isolation Precautions: contact precautions maintained   Goal Outcome Evaluation:      Plan of Care Reviewed With: patient

## 2023-12-11 NOTE — PROGRESS NOTES
"[facility]    Medicine Progress Note - Hospitalist Service    Date of Admission:  12/9/2023    Assessment & Plan   Familia Sales is a 89M presents with generalised weakness, cough; pmhx includes CAD (JOANNA to RCA), A fib (eilquis), BPH, chronic low back pain, DM2, MDD, HLD; admitted for COVID, superimposed bacterial pneumonia.     # Acute Hypoxic respiratory failure 2/2 COVID pneumonia  #superimposed bacterial PNA  -CBC trend  -Continue IV ceftriaxone and azithromycin  -Started remdesivir and IV dexamethasone as patient is hypoxic and now requiring oxygen supplementation  -Blood cultures sent  -Will wean oxygen as tolerated     #atrial fibrillation  -eliquis 5mg PO BID  -amiodarone 200mg PO qD     #MDD  -sertraline 50mg PO every day     #hypercalcemia  Borderline elevation.  Will check Mag, phos, PTH     #DM2  HbA1c  Likely exacerbated by dexamethasone  -sliding scale insulin, likely not needed at discharge     #elevated troponin  #demand ischemia  Elevated troponin, stable. Likely demand ischemia in setting of acute illness.     #chronic pain  -Tylenol PO PRN          Diet: Combination Diet Regular Diet Adult    DVT Prophylaxis: DOAC  Kinney Catheter: Not present  Lines: None     Cardiac Monitoring: None  Code Status: Full Code      Clinically Significant Risk Factors Present on Admission               # Drug Induced Coagulation Defect: home medication list includes an anticoagulant medication         # Overweight: Estimated body mass index is 27.32 kg/m  as calculated from the following:    Height as of this encounter: 1.753 m (5' 9\").    Weight as of this encounter: 83.9 kg (185 lb).              Disposition Plan     Expected Discharge Date: 12/12/2023                    Hanane Jackson MD  Hospitalist Service  [facility]  Securely message with [employer] (more info)  Text page via [facility] Paging/Directory "   ______________________________________________________________________    Interval History   Patient was examined at the bedside.  States he is comfortable, denies any new complaints.  Discussed with the patient that we started him on dexamethasone and remdesivir due to requiring oxygen supplementation    Physical Exam   Vital Signs: Temp: 98  F (36.7  C) Temp src: Oral BP: 121/62 Pulse: 76   Resp: 20 SpO2: 91 % O2 Device: None (Room air) (to bathroom on room air, sats remained above 91%) Oxygen Delivery: 2 LPM  Weight: 185 lbs 0 oz    Constitutional: awake, alert, cooperative, no apparent distress, and appears stated age, on 3L NC   Cardiovascular: CTAB  GI: soft, nontender, nondistended  Musculoskeletal: shoulder/elbow flexion/extension 5/5 bilaterally and symmetric  Neurologic: AOx4, CN II-XII intact    Medical Decision Making       45 MINUTES SPENT BY ME on the date of service doing chart review, history, exam, documentation & further activities per the note.      Data     I have personally reviewed the following data over the past 24 hrs:    5.8  \   13.6   / 157     139 105 15.2 /  311 (H)   4.1 26 0.95 \     ALT: 42 AST: 30 AP: 90 TBILI: 0.8   ALB: 4.2 TOT PROTEIN: 6.6 LIPASE: N/A     Trop: 53 (H) BNP: N/A     Procal: N/A CRP: N/A Lactic Acid: 1.0         Imaging results reviewed over the past 24 hrs:   Recent Results (from the past 24 hour(s))   Head CT w/o contrast    Narrative    EXAM: CT HEAD W/O CONTRAST  LOCATION: [facility]  DATE: 12/9/2023    INDICATION: Altered mental status. Fever. Confusion.  COMPARISON: CT head dated 09/06/2023.  TECHNIQUE: Routine CT Head without IV contrast. Multiplanar reformats. Dose reduction techniques were used.    FINDINGS:  INTRACRANIAL CONTENTS: No intracranial hemorrhage, extraaxial collection, or mass effect.  No CT evidence of acute infarct. Moderate presumed chronic small vessel ischemic changes. Mild to moderate generalized volume loss. No  hydrocephalus. Note is made   of coarse atherosclerotic calcifications of the intracranial vasculature.     VISUALIZED ORBITS/SINUSES/MASTOIDS: Prior bilateral cataract surgery. Visualized portions of the orbits are otherwise unremarkable. Mild mucosal thickening scattered about the paranasal sinuses. No middle ear or mastoid effusion.    BONES/SOFT TISSUES: No acute abnormality.      Impression    IMPRESSION:  1.  No CT evidence for acute intracranial process.  2.  Brain atrophy and presumed chronic microvascular ischemic changes as above.   XR Chest 2 Views    Narrative    EXAM: XR CHEST 2 VIEWS  [facility]  DATE: 12/9/2023    INDICATION: fever and cough  COMPARISON: 09/06/2023      Impression    IMPRESSION: Hazy opacity at the right lung base, new from prior and suspicious for developing infectious infiltrate in setting of fever. Stable mildly enlarged cardiac silhouette. No pulmonary vascular congestion. Possible trace pleural effusions. No   pneumothorax. Spinal stimulator leads project over the midthoracic spine. Degenerative changes of the shoulders and thoracic spine.

## 2023-12-12 ENCOUNTER — APPOINTMENT (OUTPATIENT)
Dept: OCCUPATIONAL THERAPY | Facility: HOSPITAL | Age: 88
DRG: 177 | End: 2023-12-12
Attending: STUDENT IN AN ORGANIZED HEALTH CARE EDUCATION/TRAINING PROGRAM
Payer: MEDICARE

## 2023-12-12 ENCOUNTER — APPOINTMENT (OUTPATIENT)
Dept: PHYSICAL THERAPY | Facility: HOSPITAL | Age: 88
DRG: 177 | End: 2023-12-12
Attending: STUDENT IN AN ORGANIZED HEALTH CARE EDUCATION/TRAINING PROGRAM
Payer: MEDICARE

## 2023-12-12 LAB
GLUCOSE BLDC GLUCOMTR-MCNC: 210 MG/DL (ref 70–99)
GLUCOSE BLDC GLUCOMTR-MCNC: 214 MG/DL (ref 70–99)
GLUCOSE BLDC GLUCOMTR-MCNC: 226 MG/DL (ref 70–99)
GLUCOSE BLDC GLUCOMTR-MCNC: 271 MG/DL (ref 70–99)
GLUCOSE BLDC GLUCOMTR-MCNC: 345 MG/DL (ref 70–99)

## 2023-12-12 PROCEDURE — 99232 SBSQ HOSP IP/OBS MODERATE 35: CPT | Performed by: STUDENT IN AN ORGANIZED HEALTH CARE EDUCATION/TRAINING PROGRAM

## 2023-12-12 PROCEDURE — 97535 SELF CARE MNGMENT TRAINING: CPT | Mod: GO

## 2023-12-12 PROCEDURE — 258N000003 HC RX IP 258 OP 636: Performed by: STUDENT IN AN ORGANIZED HEALTH CARE EDUCATION/TRAINING PROGRAM

## 2023-12-12 PROCEDURE — 120N000001 HC R&B MED SURG/OB

## 2023-12-12 PROCEDURE — 999N000157 HC STATISTIC RCP TIME EA 10 MIN

## 2023-12-12 PROCEDURE — 250N000013 HC RX MED GY IP 250 OP 250 PS 637: Performed by: STUDENT IN AN ORGANIZED HEALTH CARE EDUCATION/TRAINING PROGRAM

## 2023-12-12 PROCEDURE — 250N000011 HC RX IP 250 OP 636: Mod: JZ | Performed by: STUDENT IN AN ORGANIZED HEALTH CARE EDUCATION/TRAINING PROGRAM

## 2023-12-12 PROCEDURE — 250N000011 HC RX IP 250 OP 636: Performed by: STUDENT IN AN ORGANIZED HEALTH CARE EDUCATION/TRAINING PROGRAM

## 2023-12-12 PROCEDURE — 97530 THERAPEUTIC ACTIVITIES: CPT | Mod: GP

## 2023-12-12 PROCEDURE — 97166 OT EVAL MOD COMPLEX 45 MIN: CPT | Mod: GO

## 2023-12-12 PROCEDURE — 97161 PT EVAL LOW COMPLEX 20 MIN: CPT | Mod: GP

## 2023-12-12 RX ADMIN — DEXAMETHASONE SODIUM PHOSPHATE 6 MG: 10 INJECTION, SOLUTION INTRAMUSCULAR; INTRAVENOUS at 09:30

## 2023-12-12 RX ADMIN — REMDESIVIR 100 MG: 100 INJECTION, POWDER, LYOPHILIZED, FOR SOLUTION INTRAVENOUS at 09:25

## 2023-12-12 RX ADMIN — APIXABAN 5 MG: 5 TABLET, FILM COATED ORAL at 20:20

## 2023-12-12 RX ADMIN — INSULIN ASPART 2 UNITS: 100 INJECTION, SOLUTION INTRAVENOUS; SUBCUTANEOUS at 09:33

## 2023-12-12 RX ADMIN — CEFTRIAXONE SODIUM 1 G: 1 INJECTION, POWDER, FOR SOLUTION INTRAMUSCULAR; INTRAVENOUS at 21:26

## 2023-12-12 RX ADMIN — INSULIN ASPART 1 UNITS: 100 INJECTION, SOLUTION INTRAVENOUS; SUBCUTANEOUS at 21:27

## 2023-12-12 RX ADMIN — SODIUM CHLORIDE 50 ML: 9 INJECTION, SOLUTION INTRAVENOUS at 09:26

## 2023-12-12 RX ADMIN — APIXABAN 5 MG: 5 TABLET, FILM COATED ORAL at 09:30

## 2023-12-12 RX ADMIN — INSULIN ASPART 5 UNITS: 100 INJECTION, SOLUTION INTRAVENOUS; SUBCUTANEOUS at 17:13

## 2023-12-12 RX ADMIN — INSULIN ASPART 2 UNITS: 100 INJECTION, SOLUTION INTRAVENOUS; SUBCUTANEOUS at 12:52

## 2023-12-12 RX ADMIN — FEXOFENADINE HYDROCHLORIDE 180 MG: 180 TABLET ORAL at 09:30

## 2023-12-12 RX ADMIN — AMIODARONE HYDROCHLORIDE 200 MG: 200 TABLET ORAL at 09:30

## 2023-12-12 RX ADMIN — AZITHROMYCIN DIHYDRATE 250 MG: 250 TABLET, FILM COATED ORAL at 17:15

## 2023-12-12 RX ADMIN — SERTRALINE HYDROCHLORIDE 50 MG: 50 TABLET ORAL at 09:29

## 2023-12-12 RX ADMIN — FUROSEMIDE 20 MG: 20 TABLET ORAL at 09:29

## 2023-12-12 ASSESSMENT — ACTIVITIES OF DAILY LIVING (ADL)
ADLS_ACUITY_SCORE: 31
ADLS_ACUITY_SCORE: 32
ADLS_ACUITY_SCORE: 31
ADLS_ACUITY_SCORE: 30
ADLS_ACUITY_SCORE: 31
ADLS_ACUITY_SCORE: 30
ADLS_ACUITY_SCORE: 32
ADLS_ACUITY_SCORE: 30
ADLS_ACUITY_SCORE: 32
ADLS_ACUITY_SCORE: 32

## 2023-12-12 NOTE — PROGRESS NOTES
"[facility]    Medicine Progress Note - Hospitalist Service    Date of Admission:  12/9/2023    Assessment & Plan   Familia Sales is a 89M presents with generalised weakness, cough; pmhx includes CAD (JOANNA to RCA), A fib (eilquis), BPH, chronic low back pain, DM2, MDD, HLD; admitted for COVID, superimposed bacterial pneumonia.     # Acute Hypoxic respiratory failure 2/2 COVID pneumonia  #superimposed bacterial PNA  -Continue IV ceftriaxone and azithromycin  -Started remdesivir and IV dexamethasone as patient was hypoxic and weaned off oxygen 12/12, patient has CRUZ and states feels dizzy  -Blood cultures 12/09: NGTD     #atrial fibrillation  -eliquis 5mg PO BID  -amiodarone 200mg PO qD     #MDD  -sertraline 50mg PO every day     #hypercalcemia  Borderline elevation.  Mag, Phos normal  PTH 66, borderline elevated  Follow up outpatient     #DM2  HbA1c 12/23 8.2  Likely exacerbated by dexamethasone  -sliding scale insulin, increased Lantus 5 to 10 units and I:C 1:10  Likely will not need on discharge     #elevated troponin  #demand ischemia  Elevated troponin, stable. Likely demand ischemia in setting of acute illness.     #chronic pain  -Tylenol PO PRN    -- PT/OT recommend Home PT and Home OT          Diet: Combination Diet Regular Diet Adult    DVT Prophylaxis: DOAC  Kinney Catheter: Not present  Lines: None     Cardiac Monitoring: None  Code Status: Full Code      Clinically Significant Risk Factors                        # DMII: A1C = 8.2 % (Ref range: <5.7 %) within past 6 months, PRESENT ON ADMISSION  # Overweight: Estimated body mass index is 27.32 kg/m  as calculated from the following:    Height as of this encounter: 1.753 m (5' 9\").    Weight as of this encounter: 83.9 kg (185 lb)., PRESENT ON ADMISSION            Disposition Plan      Expected Discharge Date: 12/13/2023    Discharge Delays: IV Medication - consider oral or Home Infusion  Oxygen Needs - Arrange Home O2          "       Hanane Jackson MD  Hospitalist Service  [facility]  Securely message with [employer] (more info)  Text page via [employer] Paging/Directory   ______________________________________________________________________    Interval History   Patient was examined at the bedside, weaned off oxygen today.  Patient states he would like to stay today and his wife is afraid that she might get COVID from him.  We said that he could self isolate at home.  On re-evaluation, patient is found to have dyspnea on exertion when he walked to the bathroom and also states he feels dizzy, will continue Remdesivir and IV antibiotics for 1 more day and reassess tomorrow    Physical Exam   Vital Signs: Temp: 97.2  F (36.2  C) Temp src: Oral BP: 134/75 Pulse: 63   Resp: 20 SpO2: 96 % O2 Device: None (Room air) Oxygen Delivery: 2 LPM  Weight: 185 lbs 0 oz    Constitutional: awake, alert, cooperative, on RA  cardiovascular: CTAB  GI: soft, nontender, nondistended  Musculoskeletal: shoulder/elbow flexion/extension 5/5 bilaterally and symmetric  Neurologic: AOx4, CN II-XII intact    Medical Decision Making       40 MINUTES SPENT BY ME on the date of service doing chart review, history, exam, documentation & further activities per the note.      Data         Imaging results reviewed over the past 24 hrs:   No results found for this or any previous visit (from the past 24 hour(s)).

## 2023-12-12 NOTE — PROGRESS NOTES
12/12/23 0840   Appointment Info   Signing Clinician's Name / Credentials (PT) Maryjane Ford DPT   Living Environment   People in Home spouse   Current Living Arrangements house   Home Accessibility stairs to enter home   Number of Stairs, Main Entrance 1   Stair Railings, Main Entrance none   Living Environment Comments one level home. one step to enter   Self-Care   Usual Activity Tolerance good   Current Activity Tolerance moderate   Equipment Currently Used at Home walker, rolling  (4WW - has 3 at home)   Fall history within last six months no   Activity/Exercise/Self-Care Comment uses walker at all times for mobility   General Information   Onset of Illness/Injury or Date of Surgery 12/09/23   Referring Physician Hanane Jackson MD   Patient/Family Therapy Goals Statement (PT) go home   Pertinent History of Current Problem (include personal factors and/or comorbidities that impact the POC) presents with generalised weakness, cough; pmhx includes CAD (JOANNA to RCA), A fib (eilquis), BPH, chronic low back pain, DM2, MDD, HLD; admitted for COVID, superimposed bacterial pneumonia   Existing Precautions/Restrictions fall   Cognition   Affect/Mental Status (Cognition) WFL   Pain Assessment   Patient Currently in Pain No   Range of Motion (ROM)   Range of Motion ROM is WFL   Strength (Manual Muscle Testing)   Strength (Manual Muscle Testing) Deficits observed during functional mobility   Strength Comments generalized weakness   Bed Mobility   Bed Mobility supine-sit   Supine-Sit [place] (Bed Mobility) supervision   Assistive Device (Bed Mobility) bed rails   Comment, (Bed Mobility) a little slow this AM, pt had just woken up.   Transfers   Transfers sit-stand transfer   Sit-Stand Transfer   Sit-Stand [place] (Transfers) contact guard   Assistive Device (Sit-Stand Transfers) walker, front-wheeled   Comment, (Sit-Stand Transfer) unsteady, heavy posterior lean   Gait/Stairs (Locomotion)   [place]  Level (Gait) contact guard   Assistive Device (Gait) walker, front-wheeled   Distance in Feet (Gait) around bed, x 10'   Comment, (Gait/Stairs) stairs not completed, pt unsteady with amb. O2 at 96% on RA   Clinical Impression   Criteria for Skilled Therapeutic Intervention Yes, treatment indicated   PT Diagnosis (PT) impaired functional mobility, gait abnormality   Influenced by the following impairments decrased strength, decreased endurance, decreased balance   Functional limitations due to impairments gait, transfers, stairs   Clinical Presentation (PT Evaluation Complexity) stable   Clinical Presentation Rationale pt presents as medically diagnosed   Clinical Decision Making (Complexity) low complexity   Planned Therapy Interventions (PT) balance training;bed mobility training;gait training;home exercise program;neuromuscular re-education;patient/family education;stair training;strengthening;transfer training   Risk & Benefits of therapy have been explained evaluation/treatment results reviewed;patient   PT Total Evaluation Time   PT Eval, Low Complexity Minutes (65440) 10   Physical Therapy Goals   PT Frequency Daily   PT Predicted Duration/Target Date for Goal Attainment 12/19/23   PT Goals Bed Mobility;Transfers;Gait;Stairs   PT: Bed Mobility Independent;Supine to/from sit   PT: Transfers Modified independent;Sit to/from stand;Bed to/from chair;Assistive device   PT: Gait Modified independent;Assistive device;Rolling walker;50 feet   PT: Stairs Supervision/stand-by assist;1 stair   Interventions   Interventions Quick Adds Therapeutic Activity   Therapeutic Activity   Therapeutic Activities: dynamic activities to improve functional performance Minutes (99295) 8   Symptoms Noted During/After Treatment None   Treatment Detail/Skilled Intervention additional sit <> stand with FWW, walker height adjusted for better fit for pt. No LOB from recliner. Static standing x 4 minutes with cues for upright, no reports of  dizziness. Good stability. Needing inc v/c for safety for all mobility   PT Discharge Planning   PT Plan amb in room with FWW vs bring 4WW. 1 step to enter.   PT Discharge Recommendation (DC Rec) home with assist;home with home care physical therapy   PT Rationale for DC Rec home with PT and spouse support as needed   PT Brief overview of current status amb around bed x 10' with CGA and FWW   PT Equipment Needed at Discharge walker, rolling   Total Session Time   Timed Code Treatment Minutes 8   Total Session Time (sum of timed and untimed services) 18

## 2023-12-12 NOTE — PLAN OF CARE
"  Problem: Adult Inpatient Plan of Care  Goal: Plan of Care Review  Description: The Plan of Care Review/Shift note should be completed every shift.  The Outcome Evaluation is a brief statement about your assessment that the patient is improving, declining, or no change.  This information will be displayed automatically on your shift  note.  Outcome: Progressing  Goal: Patient-Specific Goal (Individualized)  Description: You can add care plan individualizations to a care plan. Examples of Individualization might be:  \"Parent requests to be called daily at 9am for status\", \"I have a hard time hearing out of my right ear\", or \"Do not touch me to wake me up as it startles  me\".  Outcome: Progressing  Goal: Absence of Hospital-Acquired Illness or Injury  Outcome: Progressing  Intervention: Identify and Manage Fall Risk  Recent Flowsheet Documentation  Taken 12/11/2023 1700 by Katia Santillan RN  Safety Promotion/Fall Prevention: activity supervised  Goal: Optimal Comfort and Wellbeing  Outcome: Progressing  Goal: Readiness for Transition of Care  Outcome: Progressing   Goal Outcome Evaluation:       Pt alert and oriented, tolerating oral intake well, BG monitoring done and scheduled coverage administered. VSS on 2L O2, lungs sound diminish bilaterally, intermittent coughing and  pt  denies pain, using the urinal at bedside, passing urine without difficulty.                  "

## 2023-12-12 NOTE — PLAN OF CARE
"  Problem: Adult Inpatient Plan of Care  Goal: Plan of Care Review  Description: The Plan of Care Review/Shift note should be completed every shift.  The Outcome Evaluation is a brief statement about your assessment that the patient is improving, declining, or no change.  This information will be displayed automatically on your shift  note.  12/12/2023 0539 by Katia Santillan RN  Outcome: Progressing  12/11/2023 2212 by Katia Santillan RN  Outcome: Progressing  Goal: Patient-Specific Goal (Individualized)  Description: You can add care plan individualizations to a care plan. Examples of Individualization might be:  \"Parent requests to be called daily at 9am for status\", \"I have a hard time hearing out of my right ear\", or \"Do not touch me to wake me up as it startles  me\".  12/12/2023 0539 by Katia Santillan RN  Outcome: Progressing  12/11/2023 2212 by Katia Santillan RN  Outcome: Progressing  Goal: Absence of Hospital-Acquired Illness or Injury  12/12/2023 0539 by Katia Santillan RN  Outcome: Progressing  12/11/2023 2212 by Katia Santillan RN  Outcome: Progressing  Intervention: Identify and Manage Fall Risk  Recent Flowsheet Documentation  Taken 12/12/2023 0122 by Katia Santillan RN  Safety Promotion/Fall Prevention: activity supervised  Taken 12/11/2023 1700 by Katia Santillan RN  Safety Promotion/Fall Prevention: activity supervised  Goal: Optimal Comfort and Wellbeing  12/12/2023 0539 by Katia Santillan RN  Outcome: Progressing  12/11/2023 2212 by Katia Santillan RN  Outcome: Progressing  Goal: Readiness for Transition of Care  12/12/2023 0539 by Katia Santillan RN  Outcome: Progressing  12/11/2023 2212 by Katia Santillan RN  Outcome: Progressing   Goal Outcome Evaluation:     Pt alert, calm and pleasant.able to use call-light and make needs known, rested well during the shift, denies pain and SOB, oxygen wean to 1L sats 93% and  tolerated well .                  "

## 2023-12-12 NOTE — PROGRESS NOTES
12/12/23 1030   Appointment Info   Signing Clinician's Name / Credentials (OT) Amy Holt HEATHER OTR/L CLT   Living Environment   People in Home spouse   Current Living Arrangements house   Self-Care   Activity/Exercise/Self-Care Comment I at baseline, active withwalker- walks for 15 minutes in his room each morning. Walk shower with GB and bath chair. Currently doesn't use bath chair   General Information   Onset of Illness/Injury or Date of Surgery 12/09/23   Referring Physician    Additional Occupational Profile Info/Pertinent History of Current Problem COVID   Cognitive Status Examination   Affect/Mental Status (Cognitive) WNL   Follows Commands WNL   Transfers   Transfers sit-stand transfer;toilet transfer   Sit-Stand Transfer   Sit-Stand [place] (Transfers) supervision   Toilet Transfer   Type (Toilet Transfer) sit-stand;stand-sit   [place] Level (Toilet Transfer) contact guard;verbal cues   Activities of Daily Living   BADL Assessment/Intervention toileting   Toileting   [place] Level (Toileting) independent;perform perineal hygiene   Clinical Impression   Criteria for Skilled Therapeutic Interventions Met (OT) Yes, treatment indicated   OT Diagnosis decreased strength/act tolerance for ADLs   OT Problem List-Impairments impacting ADL problems related to;activity tolerance impaired;strength   Assessment of Occupational Performance 1-3 Performance Deficits   Identified Performance Deficits strength   Planned Therapy Interventions (OT) ADL retraining;transfer training   Clinical Decision Making Complexity (OT) detailed assessment/moderate complexity   Risk & Benefits of therapy have been explained care plan/treatment goals reviewed   OT Total Evaluation Time   OT Eval, Moderate Complexity Minutes (99021) 8   OT Goals   Therapy Frequency (OT) Daily   OT Predicted Duration/Target Date for Goal Attainment 12/19/23   OT Goals Hygiene/Grooming;Lower Body Dressing;Transfers   OT:  Hygiene/Grooming supervision/stand-by assist   OT: Lower Body Dressing Supervision/stand-by assist   OT: Transfer Supervision/stand-by assist   Interventions   Interventions Quick Adds Self-Care/Home Management   Self-Care/Home Management   Self-Care/Home Mgmt/ADL, Compensatory, Meal Prep Minutes (09480) 10   Treatment Detail/Skilled Intervention Patient ambulated into BR with fww and CGA. Stood at sink after toileting with CGA- ~8mins of brushing teeth, hair, and washing face   OT Discharge Planning   OT Plan trsfs, g/h, Lb drsg, ex   OT Discharge Recommendation (DC Rec) home with home care occupational therapy   OT Brief overview of current status CGA   Total Session Time   Timed Code Treatment Minutes 10   Total Session Time (sum of timed and untimed services) 18

## 2023-12-12 NOTE — PLAN OF CARE
"  Problem: Adult Inpatient Plan of Care  Goal: Plan of Care Review  Description: The Plan of Care Review/Shift note should be completed every shift.  The Outcome Evaluation is a brief statement about your assessment that the patient is improving, declining, or no change.  This information will be displayed automatically on your shift  note.  Outcome: Progressing  Flowsheets (Taken 12/12/2023 1109)  Plan of Care Reviewed With: patient  Goal: Patient-Specific Goal (Individualized)  Description: You can add care plan individualizations to a care plan. Examples of Individualization might be:  \"Parent requests to be called daily at 9am for status\", \"I have a hard time hearing out of my right ear\", or \"Do not touch me to wake me up as it startles  me\".  Outcome: Progressing  Goal: Absence of Hospital-Acquired Illness or Injury  Outcome: Progressing  Intervention: Identify and Manage Fall Risk  Recent Flowsheet Documentation  Taken 12/12/2023 0851 by Familia Moore RN  Safety Promotion/Fall Prevention: activity supervised  Intervention: Prevent Skin Injury  Recent Flowsheet Documentation  Taken 12/12/2023 0851 by Familia Moore RN  Body Position: position maintained  Goal: Readiness for Transition of Care  Outcome: Progressing     Problem: Risk for Delirium  Goal: Improved Sleep  Outcome: Progressing     Problem: Infection  Goal: Absence of Infection Signs and Symptoms  Outcome: Progressing   Goal Outcome Evaluation:      Plan of Care Reviewed With: patient                 "

## 2023-12-12 NOTE — PROGRESS NOTES
"Care Management Follow Up    Length of Stay (days): 2    Expected Discharge Date: 12/12/2023     Concerns to be Addressed:  Care progression         Patient plan of care discussed at interdisciplinary rounds: Yes    Anticipated Discharge Disposition:  Home with Home care SN/PT     Anticipated Discharge Services:  Home with Home care SN/PT  Anticipated Discharge DME:  NA    Patient/family educated on Medicare website which has current facility and service quality ratings:  NA  Education Provided on the Discharge Plan:  Yes per team  Patient/Family in Agreement with the Plan:  NA    Referrals Placed by CM/SW:  NA  Private pay costs discussed: Not applicable    Additional Information:  Per provider, Dr. Jackson, patient is ready for discharge today pending PT rec.    PT discharge rec home with home care PT.    Social Hx: \"COVID +. Lives at home with spouse, normally independent. No CM needs indicated at this time. Follow for needs.\"    RNCM to follow for medical progression, recommendations, and final discharge plan.     Kaylee Santamaria RN     1049 called patient's spouse, Jhoana, to discuss the above. Jhoana is OK with home care, but she is concern she may get Covid from him. Jhoana prefer patient stay another day.  Family will transport    Message sent to provider    Sent message to CM leaders  Per Sonia call Infection control (Connie Warner) at [phone].  1100 called Connie Warner and left a message    Provider follow up on infection control decision. No response.  1240 called Connie Warner and left a message    1330 called Swapna Roth at [phone], no response. Sent an email [email or web address]     Rec'd message to call Connie [phone]  Called Connie day 0-5 quarantine at home. If patient  is stable, then to discharge.    1425 Sent message to update provider. Per provider, Dr. Jackson, she re-eval patient and \"He is breathless with exertion,\" so will stay another day to monitor.    Called patient's spouse, no " answer    1530 called patient's spouse, above info given. Instructed and teach the spouse for purchasing masks, hand  and disinfecting wipes. Teach on social distance. Plan for discharge tomorrow, unless patient's condition change. The spouse verified she has no underlying illness to prevent him from returning home.

## 2023-12-13 VITALS
HEIGHT: 69 IN | RESPIRATION RATE: 20 BRPM | OXYGEN SATURATION: 96 % | TEMPERATURE: 97.5 F | DIASTOLIC BLOOD PRESSURE: 72 MMHG | BODY MASS INDEX: 27.4 KG/M2 | SYSTOLIC BLOOD PRESSURE: 132 MMHG | HEART RATE: 52 BPM | WEIGHT: 185 LBS

## 2023-12-13 LAB
GLUCOSE BLDC GLUCOMTR-MCNC: 186 MG/DL (ref 70–99)
GLUCOSE BLDC GLUCOMTR-MCNC: 195 MG/DL (ref 70–99)
GLUCOSE BLDC GLUCOMTR-MCNC: 214 MG/DL (ref 70–99)
PLATELET # BLD AUTO: 176 10E3/UL (ref 150–450)

## 2023-12-13 PROCEDURE — 99239 HOSP IP/OBS DSCHRG MGMT >30: CPT | Performed by: STUDENT IN AN ORGANIZED HEALTH CARE EDUCATION/TRAINING PROGRAM

## 2023-12-13 PROCEDURE — 250N000013 HC RX MED GY IP 250 OP 250 PS 637: Performed by: STUDENT IN AN ORGANIZED HEALTH CARE EDUCATION/TRAINING PROGRAM

## 2023-12-13 PROCEDURE — 999N000157 HC STATISTIC RCP TIME EA 10 MIN

## 2023-12-13 PROCEDURE — 250N000011 HC RX IP 250 OP 636: Mod: JZ | Performed by: STUDENT IN AN ORGANIZED HEALTH CARE EDUCATION/TRAINING PROGRAM

## 2023-12-13 PROCEDURE — 258N000003 HC RX IP 258 OP 636: Performed by: STUDENT IN AN ORGANIZED HEALTH CARE EDUCATION/TRAINING PROGRAM

## 2023-12-13 PROCEDURE — 85049 AUTOMATED PLATELET COUNT: CPT | Performed by: STUDENT IN AN ORGANIZED HEALTH CARE EDUCATION/TRAINING PROGRAM

## 2023-12-13 PROCEDURE — 36415 COLL VENOUS BLD VENIPUNCTURE: CPT | Performed by: STUDENT IN AN ORGANIZED HEALTH CARE EDUCATION/TRAINING PROGRAM

## 2023-12-13 RX ORDER — AZITHROMYCIN 250 MG/1
250 TABLET, FILM COATED ORAL DAILY
Qty: 2 TABLET | Refills: 0 | Status: SHIPPED | OUTPATIENT
Start: 2023-12-13 | End: 2023-12-15

## 2023-12-13 RX ORDER — CEFPODOXIME PROXETIL 200 MG/1
200 TABLET, FILM COATED ORAL 2 TIMES DAILY
Qty: 4 TABLET | Refills: 0 | Status: SHIPPED | OUTPATIENT
Start: 2023-12-13 | End: 2023-12-15

## 2023-12-13 RX ADMIN — AMIODARONE HYDROCHLORIDE 200 MG: 200 TABLET ORAL at 09:12

## 2023-12-13 RX ADMIN — INSULIN ASPART 2 UNITS: 100 INJECTION, SOLUTION INTRAVENOUS; SUBCUTANEOUS at 09:17

## 2023-12-13 RX ADMIN — INSULIN ASPART 1 UNITS: 100 INJECTION, SOLUTION INTRAVENOUS; SUBCUTANEOUS at 11:37

## 2023-12-13 RX ADMIN — REMDESIVIR 100 MG: 100 INJECTION, POWDER, LYOPHILIZED, FOR SOLUTION INTRAVENOUS at 09:18

## 2023-12-13 RX ADMIN — SODIUM CHLORIDE 50 ML: 9 INJECTION, SOLUTION INTRAVENOUS at 11:37

## 2023-12-13 RX ADMIN — DEXAMETHASONE SODIUM PHOSPHATE 6 MG: 10 INJECTION, SOLUTION INTRAMUSCULAR; INTRAVENOUS at 09:12

## 2023-12-13 RX ADMIN — FUROSEMIDE 20 MG: 20 TABLET ORAL at 09:12

## 2023-12-13 RX ADMIN — SERTRALINE HYDROCHLORIDE 50 MG: 50 TABLET ORAL at 09:12

## 2023-12-13 RX ADMIN — APIXABAN 5 MG: 5 TABLET, FILM COATED ORAL at 09:12

## 2023-12-13 RX ADMIN — FEXOFENADINE HYDROCHLORIDE 180 MG: 180 TABLET ORAL at 09:12

## 2023-12-13 ASSESSMENT — ACTIVITIES OF DAILY LIVING (ADL)
ADLS_ACUITY_SCORE: 30

## 2023-12-13 NOTE — PLAN OF CARE
"  Problem: Adult Inpatient Plan of Care  Goal: Plan of Care Review  Description: The Plan of Care Review/Shift note should be completed every shift.  The Outcome Evaluation is a brief statement about your assessment that the patient is improving, declining, or no change.  This information will be displayed automatically on your shift  note.  12/13/2023 0509 by Katia Santillan RN  Outcome: Progressing  12/13/2023 0500 by Katia Santillan RN  Outcome: Progressing  Goal: Patient-Specific Goal (Individualized)  Description: You can add care plan individualizations to a care plan. Examples of Individualization might be:  \"Parent requests to be called daily at 9am for status\", \"I have a hard time hearing out of my right ear\", or \"Do not touch me to wake me up as it startles  me\".  12/13/2023 0509 by Katia Santillan RN  Outcome: Progressing  12/13/2023 0500 by Katia Santillan RN  Outcome: Progressing  Goal: Absence of Hospital-Acquired Illness or Injury  12/13/2023 0509 by Katia Santillan RN  Outcome: Progressing  12/13/2023 0500 by Katia Santillan RN  Outcome: Progressing  Intervention: Identify and Manage Fall Risk  Recent Flowsheet Documentation  Taken 12/13/2023 0034 by Katia Santillan RN  Safety Promotion/Fall Prevention: activity supervised  Goal: Readiness for Transition of Care  12/13/2023 0509 by Katia Santillan RN  Outcome: Progressing  12/13/2023 0500 by Katia Santillan RN  Outcome: Progressing   Goal Outcome Evaluation:       Pt alert,able to make needs known, assist X1 with walker, denies pain. Continent of B&B, urinal at bedside. VSS on room air. Peripheral IV saline lock.  Special precaution maintained for covid pos.                 "

## 2023-12-13 NOTE — PLAN OF CARE
Problem: Risk for Delirium  Goal: Improved Attention and Thought Clarity  Intervention: Maximize Cognitive Function  Recent Flowsheet Documentation  Taken 12/12/2023 1700 by Alejandro Small RN  Sensory Stimulation Regulation: television on  Reorientation Measures:   clock in view   calendar in view   Goal Outcome Evaluation:             Patient A and O times 4. Can answer all questions appropriately. Assist of 1 for cares and tranfers. On room air. Saline locked. Patient Covid positive and on Covid precautions. Lungs diminished but patient sating well on room air. Possible discharge tomorrow.

## 2023-12-13 NOTE — PLAN OF CARE
Occupational Therapy Discharge Summary    Reason for therapy discharge:    Discharged to home with home therapy.    Progress towards therapy goal(s). See goals on Care Plan in Psychiatric electronic health record for goal details.  Goals partially met.  Barriers to achieving goals:   discharge from facility.    Therapy recommendation(s):    Continued therapy is recommended.  Rationale/Recommendations:  to improve ADL independence.

## 2023-12-13 NOTE — PROGRESS NOTES
Physical Therapy Discharge Summary    Reason for therapy discharge:    Discharged to home with home therapy.    Progress towards therapy goal(s). See goals on Care Plan in [facility] electronic health record for goal details.  Goals partially met.  Barriers to achieving goals:   discharge from facility.    Therapy recommendation(s):    Further recommended therapy is related to documented deficits, and is necessary to maximize functional independence in order for patient to return to prior level of function.      Yamilet Stinson DPT 12/13/2023

## 2023-12-13 NOTE — PLAN OF CARE
"  Problem: Adult Inpatient Plan of Care  Goal: Plan of Care Review  Description: The Plan of Care Review/Shift note should be completed every shift.  The Outcome Evaluation is a brief statement about your assessment that the patient is improving, declining, or no change.  This information will be displayed automatically on your shift  note.  12/13/2023 1009 by Stevan Baker, RN  Outcome: Progressing     Problem: Adult Inpatient Plan of Care  Goal: Patient-Specific Goal (Individualized)  Description: You can add care plan individualizations to a care plan. Examples of Individualization might be:  \"Parent requests to be called daily at 9am for status\", \"I have a hard time hearing out of my right ear\", or \"Do not touch me to wake me up as it startles  me\".  12/13/2023 1157 by Stevan Baker, RN  Outcome: Met     Problem: Adult Inpatient Plan of Care  Goal: Absence of Hospital-Acquired Illness or Injury  Outcome: Met   Goal Outcome Evaluation:  Patient medically stable to discharge. Patient will discharge today with belongings, home care will be provided, Wife will transport patient.                      " no

## 2023-12-13 NOTE — PROGRESS NOTES
Care Management Discharge Note    Discharge Date: 12/13/2023       Discharge Disposition: Home, Home Care - [facility]    Discharge Services: Home, Home Care - [facility]    Discharge DME: None    Discharge Transportation:  Family/friends    Private pay costs discussed: Not applicable    Does the patient's insurance plan have a 3 day qualifying hospital stay waiver?  Yes     Which insurance plan 3 day waiver is available? [employer]    Will the waiver be used for post-acute placement? No    PAS Confirmation Code:  NA  Patient/family educated on Medicare website which has current facility and service quality ratings: yes    Education Provided on the Discharge Plan: Yes per team  Persons Notified of Discharge Plans: Patient per team  Patient/Family in Agreement with the Plan: yes    Handoff Referral Completed: Yes    Additional Information:  Patient discharge Home, Home Care - [facility]. Family will transport.    Kaylee Santamaria RN      Pt left before receiving discharge papers and repeat vitals. Pt pulled out IV line.

## 2023-12-13 NOTE — PLAN OF CARE
"  Problem: Adult Inpatient Plan of Care  Goal: Plan of Care Review  Description: The Plan of Care Review/Shift note should be completed every shift.  The Outcome Evaluation is a brief statement about your assessment that the patient is improving, declining, or no change.  This information will be displayed automatically on your shift  note.  Outcome: Progressing     Problem: Adult Inpatient Plan of Care  Goal: Patient-Specific Goal (Individualized)  Description: You can add care plan individualizations to a care plan. Examples of Individualization might be:  \"Parent requests to be called daily at 9am for status\", \"I have a hard time hearing out of my right ear\", or \"Do not touch me to wake me up as it startles  me\".  Outcome: Progressing     Problem: Adult Inpatient Plan of Care  Goal: Absence of Hospital-Acquired Illness or Injury  Intervention: Identify and Manage Fall Risk  Recent Flowsheet Documentation  Taken 12/13/2023 0800 by Stevan Baker RN  Safety Promotion/Fall Prevention:   activity supervised   assistive device/personal items within reach     Problem: Infection  Goal: Absence of Infection Signs and Symptoms  Outcome: Progressing   Goal Outcome Evaluation:       Patient on RA, , VS WDL, assist of 1 with walker to bathroom, alert oriented x 4, COVID precautions maintained.                 "

## 2023-12-13 NOTE — DISCHARGE SUMMARY
[facility]    Hospitalist Discharge Summary       Date of Admission:  12/9/2023  Date of Discharge:  12/13/2023  1:06 PM  Discharging Provider: Hanane Jackson MD      Discharge Diagnoses   # Acute Hypoxic respiratory failure 2/2 COVID pneumonia  #superimposed bacterial PNA  #atrial fibrillation  #MDD  #hypercalcemia  #DM2  #elevated troponin  #demand ischemia  #chronic pain    Follow-ups Needed After Discharge   Follow-up Appointments     Follow-up and recommended labs and tests       Follow up with primary care provider, Josefa Saleh, within 7 days for   hospital follow- up.  The following labs/tests are recommended: Monitor   DM, blood glucose - off medication, received Insulin coverage as patient   was on steroids during hospital stay            Unresulted Labs Ordered in the Past 30 Days of this Admission       Date and Time Order Name Status Description    12/9/2023  9:48 PM Blood Culture Peripheral Blood Preliminary     12/9/2023  9:48 PM Blood Culture Peripheral Blood Preliminary         These results will be followed up by [facility]   Familia Sales is a 89M presents with generalised weakness, cough; pmhx includes CAD (JOANNA to RCA), A fib (eilquis), BPH, chronic low back pain, DM2, MDD, HLD; admitted for COVID, superimposed bacterial pneumonia.     # Acute Hypoxic respiratory failure 2/2 COVID pneumonia  #superimposed bacterial PNA  -Swicthed from IV ceftriaxone and azithromycin to oral cefpodoxime and azithromycin to complete 5days  -Received IV remdesivir and IV dexamethasone for 5 days    #hypercalcemia  Borderline elevation, PTH borderline high  Follow up outpatient    #atrial fibrillation  #MDD     #DM2  HbA1c 12/23 8.2  On insulin during hospital stay due to IV dexamethasone  Not starting oral antidiabetics secondary to HbA1c due to age     #elevated troponin  #demand ischemia  Elevated troponin, stable. Likely demand ischemia in setting of acute  illness.     #chronic pain  -Tylenol PO PRN    -- PT/OT recommend Home PT and Home OT    Consultations This Hospital Stay   PHYSICAL THERAPY ADULT IP CONSULT  OCCUPATIONAL THERAPY ADULT IP CONSULT    Code Status   Full Code    Time Spent on this Encounter   I,Hanane Jackson, personally saw the patient today and spent approximately 35 minutes discharging this patient.       Hanane Jackson MD  [facility]  ______________________________________________________________________    Physical Exam   Vital Signs: Temp: 97.5  F (36.4  C) Temp src: Axillary BP: 132/72 Pulse: 52   Resp: 20 SpO2: 96 % O2 Device: None (Room air)    Weight: 185 lbs 0 oz    Physical Exam      Constitutional: awake, alert, cooperative, on RA  cardiovascular: CTAB  GI: soft, nontender, nondistended  Musculoskeletal: shoulder/elbow flexion/extension 5/5 bilaterally and symmetric  Neurologic: AOx4, CN II-XII intact    Primary Care Physician   Josefa Saleh    Discharge Disposition   Discharged to home  Condition at discharge: Stable    Significant Results and Procedures   Most Recent 3 CBC's:  Recent Labs   Lab Test 12/13/23  0533 12/11/23  0531 12/10/23  0648 12/09/23 2135 02/01/22  1122   WBC  --  4.5 5.8 6.2 9.8   HGB  --   --  13.6 13.3 14.7   MCV  --   --  94 92 90     --  157 153 180     Most Recent 3 BMP's:  Recent Labs   Lab Test 12/13/23  1031 12/13/23  0822 12/13/23  0203 12/11/23  0833 12/11/23  0531 12/10/23  0858 12/10/23  0648 12/10/23  0142 12/09/23 2135   NA  --   --   --   --  139  --  139  --  137   POTASSIUM  --   --   --   --  4.2  --  4.1  --  4.3   CHLORIDE  --   --   --   --  105  --  105  --  103   CO2  --   --   --   --  26  --  26  --  23   BUN  --   --   --   --  16.1  --  15.2  --  17.8   CR  --   --   --   --  0.84  --  0.95  --  0.95   ANIONGAP  --   --   --   --  8  --  8  --  11   VINAY  --   --   --   --  10.0*  --  10.0*  --  10.1*   * 195* 214*   < > 211*   < > 165*   < >  205*    < > = values in this interval not displayed.     Most Recent 2 LFT's:  Recent Labs   Lab Test 12/11/23  0531 12/10/23  0648   AST 25 30   ALT 35 42   ALKPHOS 80 90   BILITOTAL 0.5 0.8     Most Recent 3 INR's:  Recent Labs   Lab Test 11/14/19  1657   INR 1.10     Most Recent 3 Troponin's:  Recent Labs   Lab Test 03/19/21  1645   TROPI 0.024     Most Recent 3 BNP's:No lab results found.  Most Recent D-dimer:No lab results found.  Most Recent Cholesterol Panel:  Recent Labs   Lab Test 11/07/16  0915   CHOL 219*   *   HDL 49   TRIG 121       Results for orders placed or performed during the hospital encounter of 12/09/23   Head CT w/o contrast    Narrative    EXAM: CT HEAD W/O CONTRAST  LOCATION: [facility]  DATE: 12/9/2023    INDICATION: Altered mental status. Fever. Confusion.  COMPARISON: CT head dated 09/06/2023.  TECHNIQUE: Routine CT Head without IV contrast. Multiplanar reformats. Dose reduction techniques were used.    FINDINGS:  INTRACRANIAL CONTENTS: No intracranial hemorrhage, extraaxial collection, or mass effect.  No CT evidence of acute infarct. Moderate presumed chronic small vessel ischemic changes. Mild to moderate generalized volume loss. No hydrocephalus. Note is made   of coarse atherosclerotic calcifications of the intracranial vasculature.     VISUALIZED ORBITS/SINUSES/MASTOIDS: Prior bilateral cataract surgery. Visualized portions of the orbits are otherwise unremarkable. Mild mucosal thickening scattered about the paranasal sinuses. No middle ear or mastoid effusion.    BONES/SOFT TISSUES: No acute abnormality.      Impression    IMPRESSION:  1.  No CT evidence for acute intracranial process.  2.  Brain atrophy and presumed chronic microvascular ischemic changes as above.   XR Chest 2 Views    Narrative    EXAM: XR CHEST 2 VIEWS  LOCATION: [facility]  DATE: 12/9/2023    INDICATION: fever and cough  COMPARISON: 09/06/2023       Impression    IMPRESSION: Hazy opacity at the right lung base, new from prior and suspicious for developing infectious infiltrate in setting of fever. Stable mildly enlarged cardiac silhouette. No pulmonary vascular congestion. Possible trace pleural effusions. No   pneumothorax. Spinal stimulator leads project over the midthoracic spine. Degenerative changes of the shoulders and thoracic spine.       Discharge Orders      Home Care Referral      Reason for your hospital stay    Acute Hypoxic respiratory failure 2/2 COVID pneumonia  Community acquired pneumonia     Follow-up and recommended labs and tests     Follow up with primary care provider, Josefa Saleh, within 7 days for hospital follow- up.  The following labs/tests are recommended: Monitor DM, blood glucose - off medication, received Insulin coverage as patient was on steroids during hospital stay     Activity    Your activity upon discharge: activity as tolerated     Diet    Follow this diet upon discharge:       Combination Diet Regular Diet Adult     Discharge Medications   Current Discharge Medication List        START taking these medications    Details   azithromycin (ZITHROMAX) 250 MG tablet Take 1 tablet (250 mg) by mouth daily for 2 days  Qty: 2 tablet, Refills: 0    Associated Diagnoses: Pneumonia of right lower lobe due to infectious organism      cefpodoxime (VANTIN) 200 MG tablet Take 1 tablet (200 mg) by mouth 2 times daily for 2 days  Qty: 4 tablet, Refills: 0    Associated Diagnoses: Pneumonia of right lower lobe due to infectious organism           CONTINUE these medications which have NOT CHANGED    Details   acetaminophen (TYLENOL) 500 MG tablet Take 500-1,000 mg by mouth every 6 hours as needed for mild pain      amiodarone (PACERONE) 200 MG tablet Take 200 mg by mouth daily      ammonium lactate (LAC-HYDRIN) 12 % external lotion Apply topically daily as needed for dry skin      carboxymethylcellulose (REFRESH PLUS) 0.5 % SOLN  ophthalmic solution Place 1 drop into both eyes daily as needed for dry eyes      [employer] GLUCOSAMINE-CHONDROIT-MSM PO 1 tablet daily      ELIQUIS ANTICOAGULANT 5 MG tablet Take 5 mg by mouth 2 times daily      ferrous sulfate (FE TABS) 325 (65 Fe) MG EC tablet Take 325 mg by mouth daily      fexofenadine (ALLEGRA) 180 MG tablet Take 180 mg by mouth daily      fish oil-omega-3 fatty acids 500 MG capsule Take 1 capsule by mouth daily      Flaxseed, Linseed, (FLAX SEED OIL) 1000 MG capsule Take by mouth daily      furosemide (LASIX) 20 MG tablet Take 20 mg by mouth daily      hydrocortisone 2.5 % cream Apply topically 2 times daily as needed for itching or rash      ketoconazole (NIZORAL) 2 % external shampoo Shampoo scalp 3 times weekly      lidocaine (LIDODERM) 5 % patch Place 2 patches onto the skin daily as needed for moderate pain To prevent lidocaine toxicity, patient should be patch free for 12 hrs daily.      nitroGLYcerin (NITROSTAT) 0.4 MG sublingual tablet Place 0.4 mg under the tongue every 5 minutes as needed for chest pain For chest pain place 1 tablet under the tongue every 5 minutes for 3 doses. If symptoms persist 5 minutes after 1st dose call 911.      olopatadine (PATANOL) 0.1 % ophthalmic solution Place 1 drop into both eyes 2 times daily prn      sertraline (ZOLOFT) 50 MG tablet Take 50 mg by mouth daily       VYNDAQEL 20 MG Take 1 capsule by mouth daily           Allergies   Allergies   Allergen Reactions    Codeine     Duloxetine     Dye [Contrast Dye]      ONE REACTION TO INJECTED DYE IN SHOULDER, ABOUT MAY 1965.    Morphine     Morphine And Related Itching    Motrin [Ibuprofen Micronized] Itching    Tagamet Itching    Vicodin [Hydrocodone-Acetaminophen]      EXTREME SWEATING, FLUSHING AND LIGHT-HEADEDNESS.      Hay Fever & [A.R.M.]

## 2023-12-14 ENCOUNTER — TELEPHONE (OUTPATIENT)
Dept: EMERGENCY MEDICINE | Facility: HOSPITAL | Age: 88
End: 2023-12-14

## 2023-12-14 ENCOUNTER — PATIENT OUTREACH (OUTPATIENT)
Dept: CARE COORDINATION | Facility: CLINIC | Age: 88
End: 2023-12-14
Payer: MEDICARE

## 2023-12-14 NOTE — PROGRESS NOTES
Clinic Care Coordination Contact  [facility]: Post-Discharge Note  SITUATION                                                      Admission:    Admission Date: 12/09/23   Reason for Admission: Weakness, cough  Discharge:   Discharge Date: 12/13/23  Discharge Diagnosis: Acute Hypoxic respiratory failure 2/2 COVID pneumonia  #superimposed bacterial PNA  #atrial fibrillation  #MDD    BACKGROUND                                                      Per hospital discharge summary and inpatient provider notes:  Familia Sales is a 89 year old male with a pertinent history of coronary artery disease, acute MI in 2021, hyperlipidemia, degenerative spinal arthritis, type 2 diabetes mellitus, and memory difficulties who presents to this ED by private car for evaluation of generalized weakness and fever.     The patient reports fever today of 100.6 degrees. He reports cough productive of orange sputum.      Per his wife, he received a vaccination yesterday. She states that he slept all day today and was confused and not answering questions when he was awake. She reports faint heartbeat while he was sleeping. His wife states that he was even falling asleep while eating dinner. The patient's family member states that he seems better now in terms of confusion.     The patient denies new leg pain, abdominal pain, chest pain, rash, neck pain, and back pain.     He does not identify any waxing or waning symptoms otherwise, exacerbating or alleviating features, associated symptoms except as mentioned. He denies any pain related complaints.       ASSESSMENT           Discharge Assessment  How are you doing now that you are home?: good  How are your symptoms? (Red Flag symptoms escalate to triage hotline per guidelines): Improved  Do you feel your condition is stable enough to be safe at home until your provider visit?: Yes  Does the patient have their discharge instructions? : Yes  Does the patient have questions regarding their  discharge instructions? : No  Were you started on any new medications or were there changes to any of your previous medications? : Yes  Does the patient have all of their medications?: No (see comment) ( remaining ones soon)  Do you have questions regarding any of your medications? : No  Do you have all of your needed medical supplies or equipment (DME)?  (i.e. oxygen tank, CPAP, cane, etc.): Yes  Discharge follow-up appointment scheduled within 14 calendar days? : No                PLAN                                                      Outpatient Plan:   Follow-up and recommended labs and tests       Follow up with primary care provider, Josefa Saleh, within 7 days for   hospital follow- up.  The following labs/tests are recommended: Monitor   DM, blood glucose - off medication, received Insulin coverage as patient   was on steroids during hospital stay         No future appointments.      For any urgent concerns, please contact our 24 hour nurse triage line: [phone] ([phone])         DAREK Reyes

## 2023-12-15 LAB
BACTERIA BLD CULT: NO GROWTH
BACTERIA BLD CULT: NO GROWTH

## 2024-01-18 NOTE — PROGRESS NOTES
HISTORY OF PRESENT ILLNESS  Patient reports pressure on the right side of the head.  He has a constant clear drainage all day during the day.  It waxes and wanes.  In the morning when he gets up he has to clear out the drainage in the nose and throat.  He wakes in the morning with a headache usually.  He also has dry mouth.  No green or yellow discharge.  No recent change in his hearing.  He does wear hearing aids.      REVIEW OF SYSTEMS  Review of Systems: a 10-system review was performed. Pertinent positives are noted in the HPI and on a separate scanned document in the chart.    PMH, PSH, FH and SH has documented in the EHR.      EXAM    CONSTITUTIONAL  General Appearance:  Normal, well developed, well nourished, no obvious distress  Ability to Communicate:  communicates appropriately.    HEAD AND FACE  Appearance and Symmetry:  Normal, no scalp or facial scarring or suspicious lesions.  Paranasal sinuses tenderness:  Normal, Paranasal sinuses non tender    EARS  Clinical speech reception threshold:  Normal, able to hear normal speech.  Auricle:  Normal, Auricles without scars, lesions, masses.  External auditory canal:  Normal, External auditory canal normal.  Tympanic membrane:  Normal, Tympanic membranes normal without swelling or erythema.  Tympanic membrane mobility:  Normal, Normal tympanic membrane mobility.'    NASAL ENDOSCOPY  The risks and benefits were reviewed with the patient.  The nose was sprayed with lidocaine and phenylephrine.  The following areas were examined:  floor, roof, middle and inferior turbinates, middle and inferior meatus, sphenoethmoidal recess, nasopharynx and septum.  The nasal scope passed without difficulty with the findings noted in the exam.  Negative exam.    NOSE (speculum or scope)  Architecture:  Normal, Grossly normal external nasal architecture with no masses or lesions.  Mucosa:  Normal mucosa, No polyps or masses.  Septum:  Normal, Septum non-obstructing.  Turbinates:   What Type Of Note Output Would You Prefer (Optional)?: Bullet Format How Severe Is Your Skin Lesion?: mild Normal, No turbinate abnormalities    ORAL CAVITY AND OROPHARYNX  Lips:  Normal.  Dental and gingiva:  Normal, No obvious dental or gingival disease.  Mucosa:  Normal, Moist mucous membranes.  Tongue:  Normal, Tongue mobile with no mucosal abnormalities  Hard and soft palate:  Normal, Hard and soft palate without cleft or mucosal lesions.  Oral pharynx:  Normal, Posterior pharynx without lesions or remarkable asymmetry.  Saliva:  Normal, Clear saliva.  Masses:  Normal, No palpable masses or pathologically enlarged lymph nodes.    NECK  Masses/lymph nodes:  Normal, No worrisome neck masses or lymph nodes.  Salivary glands:  Normal, Parotid and submandibular glands.  Trachea and larynx position:  Normal, Trachea and larynx midline.  Thyroid:  Normal, No thyroid abnormality.  Tenderness:  Normal, No cervical tenderness.  Suppleness:  Normal, Neck supple    NEUROLOGICAL  Speech pattern:  Normal, Proasaic    RESPIRATORY  Symmetry and Respiratory effort:  Normal, Symmetric chest movement and expansion with no increased intercostal retractions or use of accessory muscles.     CT HEAD 5/17  Negative for significant pathology.      IMPRESSION  Patient reports post nasal drainage and congestion.  His exam is entirely clear without evidence of nasal pathology.  He expressed relief..      RECOMMENDATION  I explained that some of his medications may be leading to dryness and thickening of nasal secretions.  This can result in the symptoms that he describes.  His main concern was that there was something sinister going on.  I reassured and he expressed satisfaction.  Follow up as needed..      Damion Joe MD   Has Your Skin Lesion Been Treated?: not been treated Is This A New Presentation, Or A Follow-Up?: Growth

## 2024-03-23 ENCOUNTER — HEALTH MAINTENANCE LETTER (OUTPATIENT)
Age: 89
End: 2024-03-23

## 2024-05-20 ENCOUNTER — APPOINTMENT (OUTPATIENT)
Dept: CT IMAGING | Facility: HOSPITAL | Age: 89
End: 2024-05-20
Attending: STUDENT IN AN ORGANIZED HEALTH CARE EDUCATION/TRAINING PROGRAM
Payer: MEDICARE

## 2024-05-20 ENCOUNTER — APPOINTMENT (OUTPATIENT)
Dept: RADIOLOGY | Facility: HOSPITAL | Age: 89
End: 2024-05-20
Attending: NURSE PRACTITIONER
Payer: MEDICARE

## 2024-05-20 ENCOUNTER — HOSPITAL ENCOUNTER (EMERGENCY)
Facility: HOSPITAL | Age: 89
Discharge: STILL ADMITTED OR ADMINISTRATIVE | End: 2024-05-20
Payer: MEDICARE

## 2024-05-20 ENCOUNTER — HOSPITAL ENCOUNTER (OUTPATIENT)
Facility: HOSPITAL | Age: 89
Setting detail: OBSERVATION
Discharge: HOME | End: 2024-05-23
Attending: STUDENT IN AN ORGANIZED HEALTH CARE EDUCATION/TRAINING PROGRAM | Admitting: FAMILY MEDICINE
Payer: MEDICARE

## 2024-05-20 VITALS
RESPIRATION RATE: 16 BRPM | TEMPERATURE: 98.7 F | DIASTOLIC BLOOD PRESSURE: 85 MMHG | HEART RATE: 70 BPM | OXYGEN SATURATION: 99 % | SYSTOLIC BLOOD PRESSURE: 162 MMHG

## 2024-05-20 DIAGNOSIS — K59.09 OTHER CONSTIPATION: Primary | ICD-10-CM

## 2024-05-20 DIAGNOSIS — S32.010A CLOSED COMPRESSION FRACTURE OF L1 VERTEBRA, INITIAL ENCOUNTER (H): ICD-10-CM

## 2024-05-20 DIAGNOSIS — M54.50 MIDLINE LOW BACK PAIN, UNSPECIFIED CHRONICITY, UNSPECIFIED WHETHER SCIATICA PRESENT: ICD-10-CM

## 2024-05-20 LAB
ALBUMIN SERPL BCG-MCNC: 3.8 G/DL (ref 3.5–5.2)
ANION GAP SERPL CALCULATED.3IONS-SCNC: 10 MMOL/L (ref 7–15)
BUN SERPL-MCNC: 20.8 MG/DL (ref 8–23)
CALCIUM SERPL-MCNC: 10.3 MG/DL (ref 8.2–9.6)
CHLORIDE SERPL-SCNC: 104 MMOL/L (ref 98–107)
CREAT SERPL-MCNC: 0.84 MG/DL (ref 0.67–1.17)
DEPRECATED HCO3 PLAS-SCNC: 24 MMOL/L (ref 22–29)
EGFRCR SERPLBLD CKD-EPI 2021: 81 ML/MIN/1.73M2
ERYTHROCYTE [DISTWIDTH] IN BLOOD BY AUTOMATED COUNT: 13.9 % (ref 10–15)
GLUCOSE BLDC GLUCOMTR-MCNC: 177 MG/DL (ref 70–99)
GLUCOSE BLDC GLUCOMTR-MCNC: 182 MG/DL (ref 70–99)
GLUCOSE BLDC GLUCOMTR-MCNC: 221 MG/DL (ref 70–99)
GLUCOSE BLDC GLUCOMTR-MCNC: 243 MG/DL (ref 70–99)
GLUCOSE SERPL-MCNC: 213 MG/DL (ref 70–99)
HCT VFR BLD AUTO: 37.8 % (ref 40–53)
HGB BLD-MCNC: 12.8 G/DL (ref 13.3–17.7)
MCH RBC QN AUTO: 31.3 PG (ref 26.5–33)
MCHC RBC AUTO-ENTMCNC: 33.9 G/DL (ref 31.5–36.5)
MCV RBC AUTO: 92 FL (ref 78–100)
PLATELET # BLD AUTO: 189 10E3/UL (ref 150–450)
POTASSIUM SERPL-SCNC: 4.3 MMOL/L (ref 3.4–5.3)
RBC # BLD AUTO: 4.09 10E6/UL (ref 4.4–5.9)
SODIUM SERPL-SCNC: 138 MMOL/L (ref 135–145)
WBC # BLD AUTO: 7.4 10E3/UL (ref 4–11)

## 2024-05-20 PROCEDURE — 80048 BASIC METABOLIC PNL TOTAL CA: CPT | Performed by: STUDENT IN AN ORGANIZED HEALTH CARE EDUCATION/TRAINING PROGRAM

## 2024-05-20 PROCEDURE — 250N000013 HC RX MED GY IP 250 OP 250 PS 637: Performed by: STUDENT IN AN ORGANIZED HEALTH CARE EDUCATION/TRAINING PROGRAM

## 2024-05-20 PROCEDURE — G0378 HOSPITAL OBSERVATION PER HR: HCPCS

## 2024-05-20 PROCEDURE — 250N000012 HC RX MED GY IP 250 OP 636 PS 637: Performed by: FAMILY MEDICINE

## 2024-05-20 PROCEDURE — 99285 EMERGENCY DEPT VISIT HI MDM: CPT | Mod: 25

## 2024-05-20 PROCEDURE — 82962 GLUCOSE BLOOD TEST: CPT

## 2024-05-20 PROCEDURE — 85027 COMPLETE CBC AUTOMATED: CPT | Performed by: STUDENT IN AN ORGANIZED HEALTH CARE EDUCATION/TRAINING PROGRAM

## 2024-05-20 PROCEDURE — 72192 CT PELVIS W/O DYE: CPT | Mod: MF

## 2024-05-20 PROCEDURE — G1010 CDSM STANSON: HCPCS

## 2024-05-20 PROCEDURE — 99222 1ST HOSP IP/OBS MODERATE 55: CPT | Mod: AI | Performed by: FAMILY MEDICINE

## 2024-05-20 PROCEDURE — 82040 ASSAY OF SERUM ALBUMIN: CPT | Performed by: STUDENT IN AN ORGANIZED HEALTH CARE EDUCATION/TRAINING PROGRAM

## 2024-05-20 PROCEDURE — 72131 CT LUMBAR SPINE W/O DYE: CPT | Mod: MF

## 2024-05-20 PROCEDURE — 99204 OFFICE O/P NEW MOD 45 MIN: CPT | Performed by: NURSE PRACTITIONER

## 2024-05-20 PROCEDURE — 72128 CT CHEST SPINE W/O DYE: CPT | Mod: MG

## 2024-05-20 PROCEDURE — 83036 HEMOGLOBIN GLYCOSYLATED A1C: CPT | Performed by: STUDENT IN AN ORGANIZED HEALTH CARE EDUCATION/TRAINING PROGRAM

## 2024-05-20 PROCEDURE — 36415 COLL VENOUS BLD VENIPUNCTURE: CPT | Performed by: STUDENT IN AN ORGANIZED HEALTH CARE EDUCATION/TRAINING PROGRAM

## 2024-05-20 PROCEDURE — 72100 X-RAY EXAM L-S SPINE 2/3 VWS: CPT

## 2024-05-20 RX ORDER — NICOTINE POLACRILEX 4 MG
15-30 LOZENGE BUCCAL
Status: DISCONTINUED | OUTPATIENT
Start: 2024-05-20 | End: 2024-05-23 | Stop reason: HOSPADM

## 2024-05-20 RX ORDER — NALOXONE HYDROCHLORIDE 0.4 MG/ML
0.4 INJECTION, SOLUTION INTRAMUSCULAR; INTRAVENOUS; SUBCUTANEOUS
Status: DISCONTINUED | OUTPATIENT
Start: 2024-05-20 | End: 2024-05-23 | Stop reason: HOSPADM

## 2024-05-20 RX ORDER — FERROUS SULFATE 325(65) MG
325 TABLET ORAL DAILY
Status: DISCONTINUED | OUTPATIENT
Start: 2024-05-20 | End: 2024-05-23 | Stop reason: HOSPADM

## 2024-05-20 RX ORDER — ACETAMINOPHEN 500 MG
500-1000 TABLET ORAL EVERY 6 HOURS PRN
Status: DISCONTINUED | OUTPATIENT
Start: 2024-05-20 | End: 2024-05-22

## 2024-05-20 RX ORDER — NALOXONE HYDROCHLORIDE 0.4 MG/ML
0.2 INJECTION, SOLUTION INTRAMUSCULAR; INTRAVENOUS; SUBCUTANEOUS
Status: DISCONTINUED | OUTPATIENT
Start: 2024-05-20 | End: 2024-05-23 | Stop reason: HOSPADM

## 2024-05-20 RX ORDER — DEXTROSE MONOHYDRATE 25 G/50ML
25-50 INJECTION, SOLUTION INTRAVENOUS
Status: DISCONTINUED | OUTPATIENT
Start: 2024-05-20 | End: 2024-05-23 | Stop reason: HOSPADM

## 2024-05-20 RX ORDER — DIAPER,BRIEF,INFANT-TODD,DISP
EACH MISCELLANEOUS 2 TIMES DAILY PRN
COMMUNITY
Start: 2024-02-21

## 2024-05-20 RX ORDER — FEXOFENADINE HCL 180 MG/1
180 TABLET ORAL DAILY
Status: DISCONTINUED | OUTPATIENT
Start: 2024-05-20 | End: 2024-05-23 | Stop reason: HOSPADM

## 2024-05-20 RX ORDER — APIXABAN 5 MG/1
5 TABLET, FILM COATED ORAL 2 TIMES DAILY
COMMUNITY
Start: 2024-02-27

## 2024-05-20 RX ORDER — LIDOCAINE 4 G/G
2 PATCH TOPICAL DAILY PRN
Status: DISCONTINUED | OUTPATIENT
Start: 2024-05-20 | End: 2024-05-23 | Stop reason: HOSPADM

## 2024-05-20 RX ORDER — HYDROMORPHONE HCL IN WATER/PF 6 MG/30 ML
0.5 PATIENT CONTROLLED ANALGESIA SYRINGE INTRAVENOUS ONCE
Status: COMPLETED | OUTPATIENT
Start: 2024-05-20 | End: 2024-05-20

## 2024-05-20 RX ORDER — OXYCODONE HYDROCHLORIDE 5 MG/1
5 TABLET ORAL EVERY 4 HOURS PRN
Status: DISCONTINUED | OUTPATIENT
Start: 2024-05-20 | End: 2024-05-23 | Stop reason: HOSPADM

## 2024-05-20 RX ORDER — AMIODARONE HYDROCHLORIDE 200 MG/1
200 TABLET ORAL DAILY
Status: DISCONTINUED | OUTPATIENT
Start: 2024-05-20 | End: 2024-05-23 | Stop reason: HOSPADM

## 2024-05-20 RX ORDER — PREDNISONE 5 MG/1
TABLET ORAL
COMMUNITY
Start: 2024-05-16 | End: 2024-05-20

## 2024-05-20 RX ORDER — CARBOXYMETHYLCELLULOSE SODIUM 5 MG/ML
1 SOLUTION/ DROPS OPHTHALMIC DAILY PRN
Status: DISCONTINUED | OUTPATIENT
Start: 2024-05-20 | End: 2024-05-23 | Stop reason: HOSPADM

## 2024-05-20 RX ADMIN — AMIODARONE HYDROCHLORIDE 200 MG: 200 TABLET ORAL at 18:23

## 2024-05-20 RX ADMIN — INSULIN ASPART 3 UNITS: 100 INJECTION, SOLUTION INTRAVENOUS; SUBCUTANEOUS at 16:32

## 2024-05-20 RX ADMIN — FEXOFENADINE HCL 180 MG: 180 TABLET ORAL at 18:23

## 2024-05-20 RX ADMIN — SERTRALINE HYDROCHLORIDE 50 MG: 50 TABLET ORAL at 16:31

## 2024-05-20 RX ADMIN — INSULIN ASPART 1 UNITS: 100 INJECTION, SOLUTION INTRAVENOUS; SUBCUTANEOUS at 07:48

## 2024-05-20 RX ADMIN — FERROUS SULFATE TAB 325 MG (65 MG ELEMENTAL FE) 325 MG: 325 (65 FE) TAB at 16:31

## 2024-05-20 RX ADMIN — INSULIN ASPART 2 UNITS: 100 INJECTION, SOLUTION INTRAVENOUS; SUBCUTANEOUS at 13:05

## 2024-05-20 RX ADMIN — ACETAMINOPHEN 1000 MG: 500 TABLET ORAL at 20:00

## 2024-05-20 RX ADMIN — ACETAMINOPHEN 1000 MG: 500 TABLET ORAL at 13:04

## 2024-05-20 ASSESSMENT — ACTIVITIES OF DAILY LIVING (ADL)
DEPENDENT_IADLS:: INDEPENDENT
ADLS_ACUITY_SCORE: 41
ADLS_ACUITY_SCORE: 41
ADLS_ACUITY_SCORE: 33
ADLS_ACUITY_SCORE: 41
ADLS_ACUITY_SCORE: 39
ADLS_ACUITY_SCORE: 33
ADLS_ACUITY_SCORE: 41
ADLS_ACUITY_SCORE: 39
ADLS_ACUITY_SCORE: 41
ADLS_ACUITY_SCORE: 39
ADLS_ACUITY_SCORE: 41
ADLS_ACUITY_SCORE: 33

## 2024-05-20 ASSESSMENT — COLUMBIA-SUICIDE SEVERITY RATING SCALE - C-SSRS
6. HAVE YOU EVER DONE ANYTHING, STARTED TO DO ANYTHING, OR PREPARED TO DO ANYTHING TO END YOUR LIFE?: NO
1. IN THE PAST MONTH, HAVE YOU WISHED YOU WERE DEAD OR WISHED YOU COULD GO TO SLEEP AND NOT WAKE UP?: NO
2. HAVE YOU ACTUALLY HAD ANY THOUGHTS OF KILLING YOURSELF IN THE PAST MONTH?: NO

## 2024-05-20 NOTE — ED PROVIDER NOTES
EMERGENCY DEPARTMENT ENCOUNTER       ED Course & Medical Decision Making     Final Impression  89 year old male presents for evaluation of low back pain that acutely worsened when attempting to help his wife up off the floor after she rolled out of bed this evening (patient's wife also in the ED, both were brought in on the same ambulance rig).  He reports that wife rolled out of bed just prior to arrival, he attempted to help her up off the floor and had severe worsening of his chronic low back pain.  Does report a history of chronic low back pain and 3 prior back surgeries.  [facility] and was started on a prednisone burst for worsening back pain.  Patient denies any falls this evening, though it appears he did worsen his low back pain by attempting to lift his wife.  CT thoracic normal, though lumbar spine shows likely acute L1 compression fracture with 20% height loss, likely new either this evening or in the last few days (may have injured a few days ago that prompted [facility] the [facility] his current [facility]one burst), though at this juncture patient still having severe pain, unable to sit up in bed even with assistance despite IV fentanyl from EMS.  Will admit for low back pain.    Prior to making a final disposition on this patient the results of patient's tests and other diagnostic studies were discussed with the patient. All questions were answered. Patient expressed understanding of the plan and was amenable to it.    Medical Decision Making    Disposition considerations: Admit.    Additional ED Course Timeline:  4:51 AM I Spoke with Dr. Mejía, Hospitalist. We further discussed the patient's case, reviewed ED work-up so far, and agreed to admit the patient.    Medications   HYDROmorphone (DILAUDID) injection 0.5 mg (has no administration in time range)       New Prescriptions    No medications on file     Final Impression     1. Closed compression fracture of  L1 vertebra, initial encounter (H)    2. Midline low back pain, unspecified chronicity, unspecified whether sciatica present        Chief Complaint     Chief Complaint   Patient presents with    Back Pain     HPI     Familia Sales is a 89 year old male who presents for evaluation of back pain.    The patient reports of chronic back pain and had increasing back pain when he stood up from the bed to check on his wife whom fell out of the bed. History of 3 previous back surgeries. [facility] and started on Prednisone. He and his wife lives independently at home. He was given 100 mcg Fentanyl en route the back of the EMS rig.    I, Arthur Clarke am serving as a scribe to document services personally performed by Dr. Jesus Flood MD, based on my observation and the provider's statements to me. I, Dr. Jesus Flood MD attest that Arthur Clarke is acting in a scribe capacity, has observed my performance of the services and has documented them in accordance with my direction.    Physical Exam     BP (!) 162/85   Pulse 70   Temp 98.7  F (37.1  C) (Oral)   Resp 16   Wt 83.9 kg (185 lb)   SpO2 99%   BMI 27.32 kg/m    Constitutional: Awake, alert, in no acute distress.  Head: Normocephalic, atraumatic.  ENT: Mucous membranes moist.  Eyes: Conjunctiva normal.  Respiratory: Respirations even, unlabored, in no acute respiratory distress.  Cardiovascular: Regular rate and rhythm. Good peripheral perfusion.  GI: Abdomen soft, non-tender.  Musculoskeletal: Moves all 4 extremities equally.  Significant low back pain with palpation in the midline lumbar spine.  Unable to even sit up in bed with assistance due to severe low back pain.  Good strength and sensation in bilateral feet.  Good DP pulses bilaterally.  Integument: Warm, dry.  Neurologic: Alert & oriented x 3. Normal speech. Grossly normal motor and sensory function. No focal deficits noted.  Psychiatric: Normal mood    Labs & Imaging      Results for orders placed or performed during the hospital encounter of 05/20/24   CT Lumbar Spine w/o Contrast    Impression    IMPRESSION:  1.  Recent appearing mild superior endplate compression fracture at L1 with approximate 20% height loss.  2.  Chronic superior endplate compression fracture at L2.  3.  Postsurgical changes of anterior and posterior fusion at L4-L5.                 CT Thoracic Spine w/o Contrast    Impression    IMPRESSION:  1.  No convincing acute thoracic spine fracture or subluxation.  2.  Moderate hiatal hernia.                          Jesus Flood MD  05/20/24 0754

## 2024-05-20 NOTE — ED NOTES
Bed: JNED-23  Expected date: 5/20/24  Expected time: 3:09 AM  Means of arrival: Ambulance  Comments:  [facility]  89 yo M  Back pain

## 2024-05-20 NOTE — ED TRIAGE NOTES
EMS was at the house for the pt's wife when he decided that he should come in as well.  Pt has chronic back pain.  Pt was transported in the same rig as the wife.  Pt was given 100 mcg of fentanyl en route.       Triage Assessment (Adult)       Row Name 05/20/24 0329          Triage Assessment    Airway WDL WDL        Respiratory WDL    Respiratory WDL WDL        Skin Circulation/Temperature WDL    Skin Circulation/Temperature WDL WDL        Cardiac WDL    Cardiac WDL WDL        Peripheral/Neurovascular WDL    Peripheral Neurovascular WDL WDL        Cognitive/Neuro/Behavioral WDL    Cognitive/Neuro/Behavioral WDL WDL

## 2024-05-20 NOTE — CONSULTS
"Care Management Initial Consult    General Information  Assessment completed with: Familia Mloina  Type of CM/SW Visit: Initial Assessment    Primary Care Provider verified and updated as needed: Yes   Readmission within the last 30 days: no previous admission in last 30 days      Reason for Consult: discharge planning  Advance Care Planning: Advance Care Planning Reviewed: no concerns identified          Communication Assessment  Patient's communication style: spoken language (English or Bilingual)             Cognitive  Cognitive/Neuro/Behavioral: WDL                      Living Environment:   People in home: spouse  Familia and wife Jhoana Davey  Current living Arrangements: house (\"Townhouse. 1 steps to enter the house and then it is all 1 level inside\".)      Able to return to prior arrangements: other (see comments) (unknown at this time)       Family/Social Support:  Care provided by: self  Provides care for: spouse  Marital Status:   Wife, Children, Friend (\"daughter, granddaughter, friends\")  Jhoana Petar       Description of Support System: Supportive, Involved    Support Assessment: Adequate family and caregiver support, Adequate social supports, Patient communicates needs well met    Current Resources:   Patient receiving home care services: No     Community Resources: None  Equipment currently used at home: walker, rolling, cane, straight (\"I mostly use my 4WW, but I also have canes I can use if needed. I acutally have 4 walker that I use at different times. I have one with big wheels when I walk the dog, and one for the car.\")  Supplies currently used at home: Oxygen Tubing/Supplies, Other (\"bipap, glasses\")    Employment/Financial:  Employment Status: retired, , previous service     Employment/ Comments: \"I use my  benefits [facility] from [facility]\". VA notification #: [id number].  Financial Concerns:     Referral to Financial " "Worker: No       Does the patient's insurance plan have a 3 day qualifying hospital stay waiver?  No      Functional Status:  Prior to admission patient needed assistance:   Dependent ADLs:: Ambulation-walker, Independent  Dependent IADLs:: Independent       Mental Health Status:          Chemical Dependency Status:                Values/Beliefs:  Spiritual, Cultural Beliefs, Restorationist Practices, Values that affect care:                 Additional Information:  Familia lives in a townhouse with his wife. \"The townhouse has 1 steps to enter the house and then it is all 1 level inside\". \"My wife fell last night on the floor and I was trying to help her get up off the floor when I started having severe back pain myself. We both took the same ambulance into the ER.\" His wife Jhoana Davey is also admitted here at [place].    He is the caregiver for his wife. He is normally independent with ADLs and IADLs. He states, \"both me and my wife still drive. She is more confused all of the time lately\".    Unknown discharge needs at this time. Awaiting neurosurgery and PT/OT recommendations. If TCU is needed he wants [facility]. He would also like it if he and his wife could go to the same TCU if required.    Daughter, granddaughter, or friend to transport at discharge.    [facility] notification #: [id number].     TOMAS was given and discussed. All questions were answered.    CM to follow for medical progression of care, discharge recommendations, and final discharge plan.    Natalie Skinner RN    "

## 2024-05-20 NOTE — PROGRESS NOTES
Phone call to update daughter, Gracy  @ [phone].  Informed daughter of move to OBS unit.    Report given to DIANA Weaver on OBS unit.

## 2024-05-20 NOTE — MEDICATION SCRIBE - ADMISSION MEDICATION HISTORY
"Medication Scribe Admission Medication History    Admission medication history is complete. The information provided in this note is only as accurate as the sources available at the time of the update.    Information Source(s): Patient via in-person    Pertinent Information: patient reports self management of medications without assistance from anyone.     When asked regarding each specific medication, patient reports: \"I don't know if I take that.\"\"I am unsure.\"\"I think so.\"    Used the 12/10/23 Scribe [facility] as basis for PTA list. Added recent RX fills from the \"[facility]\"     Patient reports taking all of his daily medications yesterday.     Prednisone: Left off PTA list. Patient doesn't recall starting this medication that was filled 5/16/24    [facility] so [facility] data is limited.     Changes made to PTA medication list:  Added: Randa Alvarez  Deleted: None  Changed: None    Allergies reviewed with patient and updates made in EHR: yes    Medication History Completed By: Gee Harris 5/20/2024 4:49 AM    PTA Med List   Medication Sig Last Dose    acetaminophen (TYLENOL) 500 MG tablet Take 500-1,000 mg by mouth every 6 hours as needed for mild pain Unknown at prn    amiodarone (PACERONE) 200 MG tablet Take 200 mg by mouth daily 5/19/2024 at am    ammonium lactate (LAC-HYDRIN) 12 % external lotion Apply topically daily as needed for dry skin Unknown at prn    carboxymethylcellulose (REFRESH PLUS) 0.5 % SOLN ophthalmic solution Place 1 drop into both eyes daily as needed for dry eyes Unknown at prn    [facility] GLUCOSAMINE-CHONDROIT-MSM PO 1 tablet daily 5/19/2024 at am    diclofenac (VOLTAREN) 1 % topical gel Apply topically daily as needed Unknown at prn    ELIQUIS ANTICOAGULANT 5 MG tablet Take 5 mg by mouth 2 times daily 5/19/2024 at pm    ferrous sulfate (FE TABS) 325 (65 Fe) MG EC tablet Take 325 mg by mouth daily 5/19/2024 at am    fexofenadine (ALLEGRA) 180 " MG tablet Take 180 mg by mouth daily 5/19/2024 at am    fish oil-omega-3 fatty acids 500 MG capsule Take 1 capsule by mouth daily 5/19/2024 at am    Flaxseed, Linseed, (FLAX SEED OIL) 1000 MG capsule Take by mouth daily 5/19/2024 at am    hydrocortisone (CORTAID) 1 % external ointment Apply topically 2 times daily as needed Unknown at prn    ketoconazole (NIZORAL) 2 % external shampoo Shampoo scalp 3 times weekly Unknown at prn    lidocaine (LIDODERM) 5 % patch Place 2 patches onto the skin daily as needed for moderate pain To prevent lidocaine toxicity, patient should be patch free for 12 hrs daily. Unknown at prn    olopatadine (PATANOL) 0.1 % ophthalmic solution Place 1 drop into both eyes 2 times daily prn Unknown at prn    sertraline (ZOLOFT) 50 MG tablet Take 50 mg by mouth daily  5/19/2024 at am    VYNDAQEL 20 MG Take 1 capsule by mouth daily 5/19/2024 at am

## 2024-05-20 NOTE — H&P
[facility]    History and Physical - Hospitalist Service       Date of Admission:  5/20/2024    Assessment & Plan      Famiila Sales is a 89 year old male with PMH significant for CAD, DM2, TESSY and chronic back pain who is admitted for observation on 5/20/2024 due to L1 compression fracture.    # L1 compression fracture- Admit for observation.  Consult Neurosurgery for evaluation and further recommendations..  As needed pain control with oxycodone 5 mg.  Bedrest.  Supportive measures.    # DM2- Place on insulin sign scale.  Monitor Accu-Cheks.    # TESSY- Resume home BiPAP settings nightly.       Diet: Combination Diet Low Consistent Carb (45 g CHO per Meal) Diet; No Caffeine Diet, Low Saturated Fat Na <2400mg Diet  DVT Prophylaxis: Pneumatic Compression Devices  Kinney Catheter: Not present  Lines: None     Cardiac Monitoring: None  Code Status: Full Code per patient.    Clinically Significant Risk Factors Present on Admission           # Hypercalcemia: Highest Ca = 10.3 mg/dL in last 2 days, will monitor as appropriate     # Drug Induced Coagulation Defect: home medication list includes an anticoagulant medication        # DMII: A1C = N/A within past 6 months               Disposition Plan   Anticipate less than 2 midnight hospital stay.           Missy Douglas MD  Hospitalist Service  [facility]  Securely message with [employer] (more info)  Text page via [employer] Paging/Directory     ______________________________________________________________________    Chief Complaint   Intractable back pain    History is obtained from the patient, ED physician and chart review.    History of Present Illness   Familia Sales is a 89 year old male with PMH significant for CAD, DM2, TESSY and chronic back pain who presents to ED due to intractable back pain.  Patient says that his chronic back pain worsened earlier this week.  He went to ED at [facility] and was given steroids.  He says  that this has not helped his lower back pain.  This evening he hurt his wife out of bed.  He went to try to lift her without success.  He then called 911.  He noticed increased pain in his back but cannot say when it exactly started.  Back pain is rated 8/10.  He has no other complaints at this time and denies chest pain, shortness of breath, palpitations, nausea, vomiting, diarrhea, abdominal pain, fever or chills.  Patient currently appears in no acute distress.      Past Medical History    CAD  DM2  TESSY  Chronic back pain    Past Surgical History   Past Surgical History:   Procedure Laterality Date    APPENDECTOMY      CORONARY STENT PLACEMENT  2014    EYE SURGERY      FOOT SURGERY      HC DEST LOC LES CHOROID, PHOTOCOAG >=1 SESSION  06    LASIK    HERNIA REPAIR      HERNIA REPAIR, UMBILICAL  06    OTHER SURGICAL HISTORY      decompression l3    OTHER SURGICAL HISTORY      spinal fusion L4-L5    RELEASE CARPAL TUNNEL Right 10/29/2014    REPLACEMENT TOTAL KNEE      SURGICAL HISTORY OF -   1959    CARTILAGE REMOVED FROM NOSE    SURGICAL HISTORY OF -   2008    LUMBAR DECOMPRESSION AT L2-3 AND L3-4    SURGICAL HISTORY OF -   09 THRU 3/06/09    CORTIZONE INJECTIOSN A SERIES OF 8    SURGICAL HISTORY OF -       INJECTION L3 NERVE ENDING    ZC ANESTH,LUMBAR SPINE,CORD SURGERY  10/28/97 & 98    L3 to sacrum with spinal fusion L4-L% and decompression L5-S1    ZZC ANESTH,TOTAL KNEE ARTHROPLASTY  03    with revision left 04    Z APPENDECTOMY  MAY 1949 OR     Z FOOT/TOES SURGERY PROC UNLISTED  1989       Prior to Admission Medications   Prior to Admission Medications   Prescriptions Last Dose Informant Patient Reported? Taking?   [facility]S GLUCOSAMINE-CHONDROIT-MSM PO 2024 at am Self Yes Yes   Si tablet daily   ELIQUIS ANTICOAGULANT 5 MG tablet 2024 at pm  Yes Yes   Sig: Take 5 mg by mouth 2 times daily   Flaxseed, Linseed, (FLAX SEED OIL) 1000  MG capsule 5/19/2024 at am Self Yes Yes   Sig: Take by mouth daily   VYNDAQEL 20 MG 5/19/2024 at am Self Yes Yes   Sig: Take 1 capsule by mouth daily   acetaminophen (TYLENOL) 500 MG tablet Unknown at prn Self Yes Yes   Sig: Take 500-1,000 mg by mouth every 6 hours as needed for mild pain   amiodarone (PACERONE) 200 MG tablet 5/19/2024 at am Self Yes Yes   Sig: Take 200 mg by mouth daily   ammonium lactate (LAC-HYDRIN) 12 % external lotion Unknown at prn Self Yes Yes   Sig: Apply topically daily as needed for dry skin   carboxymethylcellulose (REFRESH PLUS) 0.5 % SOLN ophthalmic solution Unknown at prn Self Yes Yes   Sig: Place 1 drop into both eyes daily as needed for dry eyes   diclofenac (VOLTAREN) 1 % topical gel Unknown at prn  Yes Yes   Sig: Apply topically daily as needed   ferrous sulfate (FE TABS) 325 (65 Fe) MG EC tablet 5/19/2024 at am Self Yes Yes   Sig: Take 325 mg by mouth daily   fexofenadine (ALLEGRA) 180 MG tablet 5/19/2024 at am Self Yes Yes   Sig: Take 180 mg by mouth daily   fish oil-omega-3 fatty acids 500 MG capsule 5/19/2024 at am Self Yes Yes   Sig: Take 1 capsule by mouth daily   hydrocortisone (CORTAID) 1 % external ointment Unknown at prn  Yes Yes   Sig: Apply topically 2 times daily as needed   ketoconazole (NIZORAL) 2 % external shampoo Unknown at prn Self Yes Yes   Sig: Shampoo scalp 3 times weekly   lidocaine (LIDODERM) 5 % patch Unknown at prn Self Yes Yes   Sig: Place 2 patches onto the skin daily as needed for moderate pain To prevent lidocaine toxicity, patient should be patch free for 12 hrs daily.   nitroGLYcerin (NITROSTAT) 0.4 MG sublingual tablet n/a at n/a Self Yes No   Sig: Place 0.4 mg under the tongue every 5 minutes as needed for chest pain For chest pain place 1 tablet under the tongue every 5 minutes for 3 doses. If symptoms persist 5 minutes after 1st dose call 911.   olopatadine (PATANOL) 0.1 % ophthalmic solution Unknown at prn Self Yes Yes   Sig: Place 1 drop into  both eyes 2 times daily prn   sertraline (ZOLOFT) 50 MG tablet 2024 at am Self Yes Yes   Sig: Take 50 mg by mouth daily       Facility-Administered Medications: None        Review of Systems    The 10 point Review of Systems is negative other than noted in the HPI.    Social History   I have reviewed this patient's social history and updated it with pertinent information if needed.  Social History     Tobacco Use    Smoking status: Former     Current packs/day: 0.00     Average packs/day: 1 pack/day for 24.0 years (24.0 ttl pk-yrs)     Types: Cigarettes     Start date: 1940     Quit date: 1964     Years since quittin.4    Smokeless tobacco: Never   Substance Use Topics    Alcohol use: Yes     Comment: very rare    Drug use: No         Family History   I have reviewed this patient's family history and updated it with pertinent information if needed.  Family History   Problem Relation Age of Onset    Gastrointestinal Disease Mother         ulcers    Cancer Mother     Cancer Maternal Grandfather     Depression Daughter     Liver Disease Daughter     Diabetes Daughter     Cancer Father         Physical Exam   Vital Signs: Temp: 98.7  F (37.1  C) Temp src: Oral BP: (!) 149/67 Pulse: 62   Resp: 16 SpO2: 99 % O2 Device: None (Room air)    Weight: 185 lbs 0 oz    General Appearance: AAOx3, NAD  Respiratory: CTAB  Cardiovascular: Regular rate, S1-S2  GI: +BS, soft, nontender, nondistended  Skin: No rash  Other: No pedal edema.  Patient is able to move feet without limitation.  However he does have difficulty lifting legs off stretcher due to lower back pain.    Medical Decision Making       50 MINUTES SPENT BY ME on the date of service doing chart review, history, exam, documentation & further activities per the note.      Data     I have personally reviewed the following data over the past 24 hrs:    7.4  \   12.8 (L)   / 189     138 104 20.8 /  213 (H)   4.3 24 0.84 \       Imaging results reviewed over  the past 24 hrs:   Recent Results (from the past 24 hour(s))   CT Thoracic Spine w/o Contrast    Narrative    EXAM: CT THORACIC SPINE W/O CONTRAST  LOCATION: [facility]  DATE: 5/20/2024    INDICATION: Severe low back pain after trying to lift wife off floor.  COMPARISON: CT abdomen and pelvis 2/1/2022. CT cervical spine 9/6/2023.  TECHNIQUE: Routine CT Thoracic Spine without IV contrast. Multiplanar reformats. Dose reduction techniques were used.     FINDINGS:  VERTEBRA: The bones are demineralized which can limit detection of acute fractures. Chronic mild superior endplate compression deformity at T1. Generalized mild height loss at T7. Chronic height loss at T8 and T9. Chronic superior endplate compression   deformity at T12. Chronic height loss along the superior and inferior endplates at T11. No convincing acute fracture.     CANAL/FORAMINA: Spinal stimulator device enters through the posterior interspace at T10-T11 and terminates at the T8 level. No appreciable central canal or high-grade foraminal stenosis.    PARASPINAL: Moderate hiatal hernia.      Impression    IMPRESSION:  1.  No convincing acute thoracic spine fracture or subluxation.  2.  Moderate hiatal hernia.               CT Lumbar Spine w/o Contrast    Narrative    EXAM: CT LUMBAR SPINE W/O CONTRAST  LOCATION: [facility]  DATE: 5/20/2024    INDICATION: Severe low back pain after trying to lift wife off floor.  COMPARISON: CT abdomen and pelvis 2/1/2022.  TECHNIQUE: Routine CT Lumbar Spine without IV contrast. Multiplanar reformats. Dose reduction techniques were used.     FINDINGS:  VERTEBRA: Postsurgical changes of anterior and posterior fusion at the L4-L5 level. The fusion hardware is intact without evidence of loosening. The bones are demineralized which can limit detection of acute fractures. There is a mild recent appearing   superior endplate compression deformity at the L1 level resulting in  approximate 20% height loss centrally. There is chronic height loss along the superior endplate at the L2 level. There are stable alterations in the lateral alignment.     CANAL/FORAMINA: Posterior decompression at the L3-L4 level. Advanced multilevel degenerative changes. High-grade foraminal narrowing on the right at L1-L2.    PARASPINAL: Diffuse atheromatous changes in the distal aorta.      Impression    IMPRESSION:  1.  Recent appearing mild superior endplate compression fracture at L1 with approximate 20% height loss.  2.  Chronic superior endplate compression fracture at L2.  3.  Postsurgical changes of anterior and posterior fusion at L4-L5.

## 2024-05-20 NOTE — ED NOTES
333  Remove Attending Jesus Valero MD removed as Attending      0331  Patient roomed in ED BB    To room [id number]      0331  ED Notes Filed MM    Bed: [place] [address]  Expected date: 5/20/24  Expected time: 3:09 AM  Means of arrival: Ambulance  Comments:  [facility]  89 yo M  Back pain      0331  Triage Completed BB          0331  Observed Emotional State/Risk of Violent Behavior BB    Observed Emotional StateObserved Emotional State: calm; cooperative      0331  Abuse Screen  BB    Abuse Screen (Adult)Feels Unsafe at Home or Work/School: noFeels Threatened by Someone: noDoes Anyone Try to Keep You From Having Contact with Others or Doing Things Outside Your Home?: noPhysical Signs of Abuse Present: no      0331  Fall Risk Screen  BB    Fall Risk ScreenFall Risk Indicators: 2-->age greater than 65 years; 2-->mobility deficit/weaknessFall Risk Score: 4      0331  Triage Plan BB    Triage PlanPatient Acuity: 3ED Destination: Main ED  Wrist Bands AppliedPatient ID Band Applied: Yes      0330  Other Flowsheet Documentation BB    Other flowsheet entriesQ1 Wished to be Dead (Past Month): noQ2 Suicidal Thoughts (Past Month): noQ6 Suicide Behavior (Lifetime): noLevel of Risk per Screen: no risks indicated      0329  ED Triage Notes Filed BB    EMS was at the house for the pt's wife when he decided that he should come in as well.  Pt has chronic back pain.  Pt was transported in the same rig as the wife.  Pt was given 100 mcg of fentanyl en route.         Triage Assessment (Adult)         Row Name 05/20/24 0329                 Triage Assessment     Airway WDL WDL              Respiratory WDL     Respiratory WDL WDL              Skin Circulation/Temperature WDL     Skin Circulation/Temperature WDL WDL              Cardiac WDL     Cardiac WDL WDL              Peripheral/Neurovascular WDL     Peripheral Neurovascular WDL WDL              Cognitive/Neuro/Behavioral WDL     Cognitive/Neuro/Behavioral WDL WDL                                 0329  Triage Assessment BB    Triage AssessmentAirway WDL: WDL  Respiratory WDLRespiratory WDL: WDL  Skin Circulation/Temperature WDLSkin Circulation/Temperature WDL: WDL  Cardiac WDLCardiac WDL: WDL  Peripheral/Neurovascular WDLPeripheral Neurovascular WDL: WDL  Cognitive/Neuro/Behavioral WDLCognitive/Neuro/Behavioral WDL: WDL      0329  Pain  BB    Preferred Pain ScalePatient Currently in Pain: yesPreferred Pain Scale (Adult): DVPRS (Group)  DVPRS Pain Rating Score(DVPRS) Pain Rating Score : 9-Can't bear the pain unable to do anything  Pain Goal/ComfortPain Location: back      0328  Allergies Reviewed - Unable to Assess BB    Patient unreliable      0328  Triage Vitals BB    Vital SignsBP: 162/85Patient Position: LyingPulse: 70Pulse Rate Source: MonitorResp: 16SpO2: 99 %O2 Device: None (Room air)Temp: 98.7  F (37.1  C)Temp src: Oral      0328  Vitals Reassessment BB    Vitals AssessmentAutomatic Restart Vitals Timer: Yes      0326  Triage Started BB          0326  Chief Complaints Updated BB    Back Pain      0326  Travel Screening BB    Communicable Disease Screening  Do you have any of the following new or worsening symptoms? None of these  Travel History  Have you traveled internationally or domestically in the last month? NoHave you had close contact with someone who has traveled outside the country in the past 30 days and is sick? No      0326  Arrival Doc BB    Triage CallTriage Call: Call 1x  EMS Prehospital TreatmentEMS Prehospital Treatment: YesMedications/Treatments/Assessments Administered by EMS Prior to Presentation: other (see comments) (100 mcg of fentanyl)Peripheral IV LDAs: Peripheral IV  Prehospital Blood GlucosePrehospital Blood Glucose Meter (mg/dl): 245      0326   Information/Communication Assessment  BB    Communication AssessmentPatient / Family communication style: spoken language (English or Bilingual)      0321  Patient arrived in ED AY          0242  Peripheral IV  05/20/24 Anterior;Left Lower forearm Placed  BB    Size (Gauge)/Length: 18 GBrand: B BraunOrientation: Anterior; LeftLocation: Lower forearmInserted by: EMSPlaced by External Staff?: EMS

## 2024-05-20 NOTE — CONSULTS
[facility]    Neurosurgery Consultation     Date of Admission:  5/20/2024  Date of Consult (When I saw the patient): 05/20/24    Assessment & Plan   Familia Sales is a 89 year old male with history of L4-5 fusion with Dr. Carvalho who was admitted on 5/20/2024 with acute on chronic low back pain s/p fall. Imaging reveals recent mild superior endplate compression fracture at L1 with approximately 20% height loss.     -No surgical intervention planned at this time  -Upright lumbar spine xray   -Avoid heavy lifting, bending, twisting  -Continue pain control measures as needed  -Follow up with NSGY clinic in 3 weeks with repeat xray    -Appreciate assistance from specialties     I have discussed the following assessment and plan with Dr. Gonzales.     Evangelina Ortiz CNP  [facility] Neurosurgery  Tel [phone]  Pager [phone]    Code Status    Full Code    Reason for Consult   I was asked by Dr. Mejía to evaluate this patient for L1 compression fracture.    Primary Care Physician   Josefa Saleh    Chief Complaint   Low back pain  Fall     History is obtained from the patient and electronic health record    History of Present Illness   Familia Sales is a 89 year old male with history of L4-5 fusion with Dr. Carvalho who was admitted on 5/20/2024 with acute on chronic low back pain s/p fall. Patient reports recent fall about 12 days ago while he was working in the garden. He has chronic low back pain, but symptoms increased after the fall. He presented to the ED for evaluation. Imaging reveals recent mild superior endplate compression fracture at L1 with approximately 20% height loss. Today, patient describes bilateral low back pain that radiates to left hip. He has chronic pain in left calf and bilateral feet. He feels some weakness in legs due to back pain. Denies any numbness, bowel/bladder incontinence or saddle anesthesia.     Past Medical History   I have reviewed this patient's  medical history and updated it with pertinent information if needed.   No past medical history on file.    Past Surgical History   I have reviewed this patient's surgical history and updated it with pertinent information if needed.  Past Surgical History:   Procedure Laterality Date    APPENDECTOMY      CORONARY STENT PLACEMENT  2014    EYE SURGERY      FOOT SURGERY      HC DEST LOC LES CHOROID, PHOTOCOAG >=1 SESSION  06    LASIK    HERNIA REPAIR      HERNIA REPAIR, UMBILICAL  06    OTHER SURGICAL HISTORY      decompression l3    OTHER SURGICAL HISTORY      spinal fusion L4-L5    RELEASE CARPAL TUNNEL Right 10/29/2014    REPLACEMENT TOTAL KNEE      SURGICAL HISTORY OF -   1959    CARTILAGE REMOVED FROM NOSE    SURGICAL HISTORY OF -   2008    LUMBAR DECOMPRESSION AT L2-3 AND L3-4    SURGICAL HISTORY OF -   09 THRU 3/06/09    CORTIZONE INJECTIOSN A SERIES OF 8    SURGICAL HISTORY OF -       INJECTION L3 NERVE ENDING    [facility] ANESTH,LUMBAR SPINE,CORD SURGERY  10/28/97 & 98    L3 to sacrum with spinal fusion L4-L% and decompression L5-S1    [facility] ANESTH,TOTAL KNEE ARTHROPLASTY  03    with revision left 04    [facility] APPENDECTOMY  MAY 1949 OR     [facility] FOOT/TOES SURGERY PROC UNLISTED  1989       Prior to Admission Medications   Prior to Admission Medications   Prescriptions Last Dose Informant Patient Reported? Taking?   [employer] GLUCOSAMINE-CHONDROIT-MSM PO 2024 at am Self Yes Yes   Si tablet daily   ELIQUIS ANTICOAGULANT 5 MG tablet 2024 at pm  Yes Yes   Sig: Take 5 mg by mouth 2 times daily   Flaxseed, Linseed, (FLAX SEED OIL) 1000 MG capsule 2024 at am Self Yes Yes   Sig: Take by mouth daily   VYNDAQEL 20 MG 2024 at am Self Yes Yes   Sig: Take 1 capsule by mouth daily   acetaminophen (TYLENOL) 500 MG tablet Unknown at prn Self Yes Yes   Sig: Take 500-1,000 mg by mouth every 6 hours as needed for mild pain   amiodarone (PACERONE) 200 MG  tablet 5/19/2024 at am Self Yes Yes   Sig: Take 200 mg by mouth daily   ammonium lactate (LAC-HYDRIN) 12 % external lotion Unknown at prn Self Yes Yes   Sig: Apply topically daily as needed for dry skin   carboxymethylcellulose (REFRESH PLUS) 0.5 % SOLN ophthalmic solution Unknown at prn Self Yes Yes   Sig: Place 1 drop into both eyes daily as needed for dry eyes   diclofenac (VOLTAREN) 1 % topical gel Unknown at prn  Yes Yes   Sig: Apply topically daily as needed   ferrous sulfate (FE TABS) 325 (65 Fe) MG EC tablet 5/19/2024 at am Self Yes Yes   Sig: Take 325 mg by mouth daily   fexofenadine (ALLEGRA) 180 MG tablet 5/19/2024 at am Self Yes Yes   Sig: Take 180 mg by mouth daily   fish oil-omega-3 fatty acids 500 MG capsule 5/19/2024 at am Self Yes Yes   Sig: Take 1 capsule by mouth daily   hydrocortisone (CORTAID) 1 % external ointment Unknown at prn  Yes Yes   Sig: Apply topically 2 times daily as needed   ketoconazole (NIZORAL) 2 % external shampoo Unknown at prn Self Yes Yes   Sig: Shampoo scalp 3 times weekly   lidocaine (LIDODERM) 5 % patch Unknown at prn Self Yes Yes   Sig: Place 2 patches onto the skin daily as needed for moderate pain To prevent lidocaine toxicity, patient should be patch free for 12 hrs daily.   nitroGLYcerin (NITROSTAT) 0.4 MG sublingual tablet n/a at n/a Self Yes No   Sig: Place 0.4 mg under the tongue every 5 minutes as needed for chest pain For chest pain place 1 tablet under the tongue every 5 minutes for 3 doses. If symptoms persist 5 minutes after 1st dose call 911.   olopatadine (PATANOL) 0.1 % ophthalmic solution Unknown at prn Self Yes Yes   Sig: Place 1 drop into both eyes 2 times daily prn   sertraline (ZOLOFT) 50 MG tablet 5/19/2024 at am Self Yes Yes   Sig: Take 50 mg by mouth daily       Facility-Administered Medications: None     Allergies   Allergies   Allergen Reactions    Codeine     Duloxetine     Dye [Contrast Dye]      ONE REACTION TO INJECTED DYE IN SHOULDER, ABOUT  MAY 1965.    Morphine     Morphine And Codeine Itching    Motrin [Ibuprofen Micronized] Itching    Tagamet Itching    Vicodin [Hydrocodone-Acetaminophen]      EXTREME SWEATING, FLUSHING AND LIGHT-HEADEDNESS.      Hay Fever & [A.R.M.]        Social History   I have reviewed this patient's social history and updated it with pertinent information if needed. Familia Sales  reports that he quit smoking about 60 years ago. His smoking use included cigarettes. He started smoking about 84 years ago. He has a 24 pack-year smoking history. He has never used smokeless tobacco. He reports current alcohol use. He reports that he does not use drugs.    Family History   I have reviewed this patient's family history and updated it with pertinent information if needed.   Family History   Problem Relation Age of Onset    Gastrointestinal Disease Mother         ulcers    Cancer Mother     Cancer Maternal Grandfather     Depression Daughter     Liver Disease Daughter     Diabetes Daughter     Cancer Father        Review of Systems   10 point ROS negative other than symptoms noted in HPI.    Physical Exam   Vital signs with ranges:   Temp:  [98.6  F (37  C)-98.7  F (37.1  C)] 98.6  F (37  C)  Pulse:  [61-70] 61  Resp:  [16-18] 18  BP: (114-162)/(58-91) 129/61  SpO2:  [93 %-99 %] 93 %  185 lbs 0 oz    HEENT:  Normocephalic, atraumatic  Heart:  No peripheral edema  Lungs:  No SOB  Skin:  Warm and dry, good capillary refill.    NEUROLOGICAL EXAMINATION:   Mental status:  Alert and Oriented x 3, speech is fluent, following commands   Motor:  Left hip flexion and knee flex ext limited due to pain   Iliopsoas  (hip flexion)               Right: 5/5  Left:  4+/5  Quadriceps  (knee extension)       Right:  5/5  Left:  4+/5  Hamstrings  (knee flexion)            Right:  5/5  Left:  4+/5  Gastroc Soleus  (PF)                          Right:  5/5  Left:  5/5  Tibialis Ant  (DF)                          Right:  5/5  Left:  5/5  EHL                           Right:  5/5  Left:  5/5    Sensation:  Intact to light touch in BLE   Reflexes:   Negative Clonus    Tenderness to palpation of the lumbar paraspinous muscles.     Data   EXAM: CT LUMBAR SPINE W/O CONTRAST  LOCATION: [facility]  DATE: 5/20/2024  IMPRESSION:  1.  Recent appearing mild superior endplate compression fracture at L1 with approximate 20% height loss.  2.  Chronic superior endplate compression fracture at L2.  3.  Postsurgical changes of anterior and posterior fusion at L4-L5.           CBC RESULTS:   Recent Labs   Lab Test 05/20/24 0519   WBC 7.4   RBC 4.09*   HGB 12.8*   HCT 37.8*   MCV 92   MCH 31.3   MCHC 33.9   RDW 13.9        Basic Metabolic Panel:  Lab Results   Component Value Date     05/20/2024     03/19/2021      Lab Results   Component Value Date    POTASSIUM 4.3 05/20/2024    POTASSIUM 3.8 02/01/2022    POTASSIUM 4.2 03/19/2021     Lab Results   Component Value Date    CHLORIDE 104 05/20/2024    CHLORIDE 110 02/01/2022    CHLORIDE 109 03/19/2021     Lab Results   Component Value Date    VINAY 10.3 05/20/2024    VINAY 9.8 03/19/2021     Lab Results   Component Value Date    CO2 24 05/20/2024    CO2 27 02/01/2022    CO2 24 03/19/2021     Lab Results   Component Value Date    BUN 20.8 05/20/2024    BUN 18 02/01/2022    BUN 22 03/19/2021     Lab Results   Component Value Date    CR 0.84 05/20/2024    CR 0.76 03/19/2021     Lab Results   Component Value Date     05/20/2024     02/01/2022     03/19/2021     INR:  Lab Results   Component Value Date    INR 1.10 11/14/2019

## 2024-05-20 NOTE — ED TRIAGE NOTES
EMS was at the house for the pt's wife when he decided that he should come in as well.  Pt has chronic back pain.  Denies falls or injury tonight. Pt was transported in the same rig as the wife.  Pt was given 100 mcg of fentanyl en route.       Triage Assessment (Adult)       Row Name 05/20/24 0329          Triage Assessment    Airway WDL WDL        Respiratory WDL    Respiratory WDL WDL        Skin Circulation/Temperature WDL    Skin Circulation/Temperature WDL WDL        Cardiac WDL    Cardiac WDL WDL        Peripheral/Neurovascular WDL    Peripheral Neurovascular WDL WDL        Cognitive/Neuro/Behavioral WDL    Cognitive/Neuro/Behavioral WDL WDL

## 2024-05-20 NOTE — ED NOTES
Triage done on wrong chart.  Copied from other chart with same name and information.  This is the correct patient and chart

## 2024-05-20 NOTE — INTERIM SUMMARY
Brief hospitalist note  89-year-old male patient admitted due to traumatic L1 compression fracture.  Seen by neurosurgery.  Commending conservative management.    Plan  Continue management as per admitting hospitalist    Luisana Banks MD

## 2024-05-20 NOTE — PROGRESS NOTES
.Patient admitted to room 6 at approximately 1500 via wheel chair from emergency room.  Reason for Admission: Fall - has L1 compression fx  Report received from: Kenyatta Rodriguez - on phone - called in report....  Patient was accompanied by Self and daughter   Discharge transportation provided by:  Patient ambulated/transferred:  with one assist. self.  Patient is alert and orientated x 4.  Outpatient Observation education provided to: (patient, family, friend)  MDRO Education done if applicable (MRSA, VRE, etc)  Safety risks were identified during admission:  none.   Yellow risk/fall band applied:  No  Detailed Belongings: clothes and shoes.

## 2024-05-21 ENCOUNTER — APPOINTMENT (OUTPATIENT)
Dept: OCCUPATIONAL THERAPY | Facility: HOSPITAL | Age: 89
End: 2024-05-21
Payer: MEDICARE

## 2024-05-21 ENCOUNTER — APPOINTMENT (OUTPATIENT)
Dept: PHYSICAL THERAPY | Facility: HOSPITAL | Age: 89
End: 2024-05-21
Payer: MEDICARE

## 2024-05-21 LAB
GLUCOSE BLDC GLUCOMTR-MCNC: 201 MG/DL (ref 70–99)
GLUCOSE BLDC GLUCOMTR-MCNC: 204 MG/DL (ref 70–99)
GLUCOSE BLDC GLUCOMTR-MCNC: 211 MG/DL (ref 70–99)
GLUCOSE BLDC GLUCOMTR-MCNC: 222 MG/DL (ref 70–99)
GLUCOSE BLDC GLUCOMTR-MCNC: 257 MG/DL (ref 70–99)
HBA1C MFR BLD: 8.2 %

## 2024-05-21 PROCEDURE — 82962 GLUCOSE BLOOD TEST: CPT

## 2024-05-21 PROCEDURE — 99207 PR APP CREDIT; MD BILLING SHARED VISIT: CPT

## 2024-05-21 PROCEDURE — G0378 HOSPITAL OBSERVATION PER HR: HCPCS

## 2024-05-21 PROCEDURE — 97116 GAIT TRAINING THERAPY: CPT | Mod: GP

## 2024-05-21 PROCEDURE — 250N000013 HC RX MED GY IP 250 OP 250 PS 637: Performed by: FAMILY MEDICINE

## 2024-05-21 PROCEDURE — 250N000013 HC RX MED GY IP 250 OP 250 PS 637

## 2024-05-21 PROCEDURE — 99232 SBSQ HOSP IP/OBS MODERATE 35: CPT | Mod: FS | Performed by: STUDENT IN AN ORGANIZED HEALTH CARE EDUCATION/TRAINING PROGRAM

## 2024-05-21 PROCEDURE — 97166 OT EVAL MOD COMPLEX 45 MIN: CPT | Mod: GO

## 2024-05-21 PROCEDURE — 250N000013 HC RX MED GY IP 250 OP 250 PS 637: Performed by: STUDENT IN AN ORGANIZED HEALTH CARE EDUCATION/TRAINING PROGRAM

## 2024-05-21 PROCEDURE — 97535 SELF CARE MNGMENT TRAINING: CPT | Mod: GO

## 2024-05-21 PROCEDURE — 97530 THERAPEUTIC ACTIVITIES: CPT | Mod: GP

## 2024-05-21 PROCEDURE — 97162 PT EVAL MOD COMPLEX 30 MIN: CPT | Mod: GP

## 2024-05-21 PROCEDURE — 999N000157 HC STATISTIC RCP TIME EA 10 MIN

## 2024-05-21 RX ADMIN — FEXOFENADINE HCL 180 MG: 180 TABLET ORAL at 07:56

## 2024-05-21 RX ADMIN — INSULIN ASPART 2 UNITS: 100 INJECTION, SOLUTION INTRAVENOUS; SUBCUTANEOUS at 16:59

## 2024-05-21 RX ADMIN — AMIODARONE HYDROCHLORIDE 200 MG: 200 TABLET ORAL at 07:56

## 2024-05-21 RX ADMIN — LIDOCAINE 2 PATCH: 4 PATCH TOPICAL at 09:08

## 2024-05-21 RX ADMIN — FERROUS SULFATE TAB 325 MG (65 MG ELEMENTAL FE) 325 MG: 325 (65 FE) TAB at 07:55

## 2024-05-21 RX ADMIN — APIXABAN 5 MG: 5 TABLET, FILM COATED ORAL at 20:18

## 2024-05-21 RX ADMIN — SERTRALINE HYDROCHLORIDE 50 MG: 50 TABLET ORAL at 07:55

## 2024-05-21 RX ADMIN — INSULIN ASPART 3 UNITS: 100 INJECTION, SOLUTION INTRAVENOUS; SUBCUTANEOUS at 12:00

## 2024-05-21 RX ADMIN — OXYCODONE HYDROCHLORIDE 5 MG: 5 TABLET ORAL at 06:04

## 2024-05-21 RX ADMIN — INSULIN ASPART 2 UNITS: 100 INJECTION, SOLUTION INTRAVENOUS; SUBCUTANEOUS at 07:59

## 2024-05-21 RX ADMIN — OXYCODONE HYDROCHLORIDE 5 MG: 5 TABLET ORAL at 20:18

## 2024-05-21 ASSESSMENT — ACTIVITIES OF DAILY LIVING (ADL)
ADLS_ACUITY_SCORE: 34
ADLS_ACUITY_SCORE: 45
ADLS_ACUITY_SCORE: 45
ADLS_ACUITY_SCORE: 34
ADLS_ACUITY_SCORE: 30
ADLS_ACUITY_SCORE: 30
ADLS_ACUITY_SCORE: 34
ADLS_ACUITY_SCORE: 30
ADLS_ACUITY_SCORE: 34
ADLS_ACUITY_SCORE: 30
ADLS_ACUITY_SCORE: 45
ADLS_ACUITY_SCORE: 30
ADLS_ACUITY_SCORE: 45
ADLS_ACUITY_SCORE: 34
ADLS_ACUITY_SCORE: 45
ADLS_ACUITY_SCORE: 30

## 2024-05-21 NOTE — PLAN OF CARE
Goal Outcome Evaluation:      Plan of Care Reviewed With: patient    Overall Patient Progress: no change    Patient alert and oriented x4. Patient has had to go to Room 2 to be with wife, Jhoana as she is confused and calling out for him. Patient is distraught not having seen her in this condition. Patient is unable to have MRI due to her Nerve stimulator. MRI team says he cannot have MRI unless he has the regulator and it is fully charged or it has to be removed. Neuro has been consulted about it. Evangelina Ortiz from Neurosurgery was consulted. She was not on call, spoke over the phone with Faye Simpson who stated she will talk to the team tomorrow and go from there. Patient is no longer on bedrest and is activity ad nicolle. Last blood sugar was 211 at 16:37.

## 2024-05-21 NOTE — PROGRESS NOTES
Care Management Follow Up    Length of Stay (days): 0    Expected Discharge Date: 05/21/2024     Concerns to be Addressed: discharge planning     Patient plan of care discussed at interdisciplinary rounds: Yes    Anticipated Discharge Disposition:  Transitional care unit     Anticipated Discharge Services:  RN PT OT  Anticipated Discharge DME:  4 wheeled walker    Patient/family educated on Medicare website which has current facility and service quality ratings:  yes  Education Provided on the Discharge Plan:  yes  Patient/Family in Agreement with the Plan:  yes    Referrals Placed by CM/SW:    Private pay costs discussed: private room/amenity fees    Additional Information:  Chart reviewed.   SW spoke to patient at bedside to review discharge recommendations.  At this time, TCU is recommended and at this time he is going to think about it overnight. Due to observation status, he will likely not have TCU coverage, [facility] (facility of choice) requires at $10,000 down payment. SW did inform patient he would be refunded the amount not used which he seemed to consider.   Patient is also aware of home care coverage under Medicare- skilled PT and OT which he states he has had before at home. He states that he might also [facility] with [facility].  Of note, patient's wife is also admitted in the hospital also requiring TCU. Patient was informed that his wife should have TCU coverage due to different PCP provider (hers being [place]).   Patient states he has a cousin who is fairly local who he thinks might be supportive, SW encouraged him to reach out. He is aware his wife may need more long term assist in the home vs in a facility.     Patient's step daughter Gracy was updated by phone.     Per [facility] nursing home coordinator, patient is enrolled in [facility] however only SNF coverage is in a [facility] contracted facility under hospice  care.    Jordyn Rey, LGSW

## 2024-05-21 NOTE — PROGRESS NOTES
PRIMARY DIAGNOSIS: ACUTE PAIN  OUTPATIENT/OBSERVATION GOALS TO BE MET BEFORE DISCHARGE:  1. Pain Status: Improved-controlled with oral pain medications.    2. Return to near baseline physical activity: No    3. Cleared for discharge by consultants (if involved): No    Discharge Planner Nurse   Safe discharge environment identified: No  Barriers to discharge: Yes       Entered by: Lina Dockery RN 05/20/2024 10:47 PM     Pt A&O. VSS in RA. Uses urinal at bedside.      Please review provider order for any additional goals.   Nurse to notify provider when observation goals have been met and patient is ready for discharge.

## 2024-05-21 NOTE — PROGRESS NOTES
Left voice mail message for patient's daughter. SW was contacted by author to reach out to daughter for options for living arrangements post discharge. Patient has cell phone in room for direct contact with daughter.

## 2024-05-21 NOTE — PROGRESS NOTES
Physical Therapy     [facility]  OUTPATIENT PHYSICAL THERAPY EVALUATION  PLAN OF TREATMENT FOR OUTPATIENT REHABILITATION  (COMPLETE FOR INITIAL CLAIMS ONLY)  Patient's Last Name, First Name, M.I.  YOB: 1931  Familia Sales                        Provider's Name  [facility] Medical Record No.  [id number]                             Onset Date:  05/20/24   Start of Care Date:  05/21/24   Type:     _X_PT   ___OT   ___SLP Medical Diagnosis:  L1 compression fracture              PT Diagnosis:  gait instability Visits from SOC:  1     See note for plan of treatment, functional goals and certification details    I CERTIFY THE NEED FOR THESE SERVICES FURNISHED UNDER        THIS PLAN OF TREATMENT AND WHILE UNDER MY CARE     (Physician co-signature of this document indicates review and certification of the therapy plan).                 05/21/24 5232   Appointment Info   Signing Clinician's Name / Credentials (PT) Yamilet Stinson DPT   Quick Adds   Quick Adds Certification   Living Environment   People in Home spouse   Current Living Arrangements house   Home Accessibility stairs to enter home   Number of Stairs, Main Entrance 1   Living Environment Comments 1 level home. Pt has been providing A for spouse recently d/t changes in spouse cognition (also admitted to [place]).   Self-Care   Equipment Currently Used at Home walker, rolling;cane, straight;grab bar, toilet;grab bar, tub/shower;shower chair;other (see comments)   Activity/Exercise/Self-Care Comment Pt I w/ ADLs @ baseline, uses 4WW at all times   General Information   Onset of Illness/Injury or Date of Surgery 05/20/24   Referring Physician Keila Correa, NP   Patient/Family Therapy Goals Statement (PT) none stated   Pertinent History of Current Problem (include personal factors and/or comorbidities that impact the POC) 89 year old male with PMH significant for CAD, DM2, TESSY and chronic  back pain who is admitted for observation on 5/20/2024 due to L1 compression fracture.   Existing Precautions/Restrictions fall;spinal   Pain Assessment   Patient Currently in Pain Yes, see Vital Sign flowsheet   Integumentary/Edema   Integumentary/Edema Comments large bruises on left arm and leg   Posture    Posture Forward head position   Range of Motion (ROM)   Range of Motion ROM is WFL   Strength (Manual Muscle Testing)   Strength (Manual Muscle Testing) Deficits observed during functional mobility   Bed Mobility   Comment, (Bed Mobility) difficulty rolling to right in bed with cues for log roll, SBA   Transfers   Comment, (Transfers) sit to stand with Jerardo, 4WW   Gait/Stairs (Locomotion)   Comment, (Gait/Stairs) 5' with 4WW, Jerardo, shuffling steps   Balance   Balance Comments requires UE to maintain standing balance   Sensory Examination   Sensory Perception patient reports no sensory changes   Coordination   Coordination no deficits were identified   Muscle Tone   Muscle Tone no deficits were identified   Clinical Impression   Criteria for Skilled Therapeutic Intervention Yes, treatment indicated   PT Diagnosis (PT) gait instability   Influenced by the following impairments pain, weakness   Functional limitations due to impairments limited household mobility   Clinical Presentation (PT Evaluation Complexity) stable   Clinical Presentation Rationale presents as medically diagnosed   Clinical Decision Making (Complexity) moderate complexity   Planned Therapy Interventions (PT) balance training   Risk & Benefits of therapy have been explained evaluation/treatment results reviewed;participants voiced agreement with care plan;participants included;patient   PT Total Evaluation Time   PT Eval, Moderate Complexity Minutes (69408) 10   Therapy Certification   Start of care date 05/21/24   Certification date from 05/21/24   Certification date to 05/28/24   Medical Diagnosis L1 compression fracture   Physical Therapy  Goals   PT Frequency 5x/week   PT Predicted Duration/Target Date for Goal Attainment 05/28/24   PT Goals Transfers;Bed Mobility;Gait   PT: Bed Mobility Modified independent;Supine to/from sit   PT: Transfers Modified independent;Sit to/from stand   PT: Gait Modified independent;Rolling walker;150 feet   Interventions   Interventions Quick Adds Gait Training   Gait Training   Gait Training Minutes (14489) 13   Symptoms Noted During/After Treatment (Gait Training) increased pain;fatigue   Treatment Detail/Skilled Intervention cues for posture, breathing, spinal precautions   Distance in Feet 125'   [place] Level (Gait Training) minimum assist (75% patient effort)   Physical Assistance Level (Gait Training) 1 person assist   Weight Bearing (Gait Training) weight-bearing as tolerated   Assistive Device (Gait Training) rolling walker   Gait Analysis Deviations decreased armaan;decreased step length;decreased swing-to-stance ratio   Impairments (Gait Analysis/Training) balance impaired;pain;strength decreased   PT Discharge Planning   PT Plan GT with pt's 4WW, standing katya, bed mobility with log roll   PT Discharge Recommendation (DC Rec) Transitional Care Facility   PT Rationale for DC Rec unsteady 2/2 pain, requires assist of 1 for mobility and cares, not safe to return home at this time. Likely needs more supportive environment long term.   PT Brief overview of current status 125' with 4WW   Total Session Time   Timed Code Treatment Minutes 13   Total Session Time (sum of timed and untimed services) 23       Yamilet Stinson, LIZT 5/21/2024

## 2024-05-21 NOTE — PROGRESS NOTES
"   05/21/24 1430   Appointment Info   Signing Clinician's Name / Credentials (OT) ADDIE Ramirez   Quick Adds   Quick Adds Certification   Living Environment   People in Home spouse   Current Living Arrangements house   Home Accessibility stairs to enter home   Number of Stairs, Main Entrance 1   Stair Railings, Main Entrance railings safe and in good condition   Transportation Anticipated family or friend will provide   Living Environment Comments 1 level home. Pt has been providing A for spouse recently d/t changes in spouse cognition (also admitted to [facility]).   Self-Care   Equipment Currently Used at Home walker, rolling;cane, straight;grab bar, toilet;grab bar, tub/shower;shower chair;other (see comments)  (walk-in shower, sock aid & reacher)   Fall history within last six months yes   Number of times patient has fallen within last six months 4  (pt reported 4 \"actual falls\", a few additional \"close calls\" (able to catch self w/ wall before falling))   Activity/Exercise/Self-Care Comment Pt I w/ ADLs @ baseline   Instrumental Activities of Daily Living (IADL)   IADL Comments Pt I w/ IADLs @ baseline, pt spouse in charge of cooking but pt reports able to if needed   General Information   Onset of Illness/Injury or Date of Surgery 05/20/24   Referring Physician Keila Correa, NP   Patient/Family Therapy Goal Statement (OT) Familia Sales is a 89 year old male with PMH significant for CAD, DM2, TESSY and chronic back pain who presents to ED due to intractable back pain.  Patient says that his chronic back pain worsened earlier this week.  He went to ED at [facility] and was given steroids.  He says that this has not helped his lower back pain.  This evening he hurt his wife out of bed.  He went to try to lift her without success.  He then called 911.  He noticed increased pain in his back but cannot say when it exactly started.  Back pain is rated 8/10.  He has no other complaints at this time and denies " chest pain, shortness of breath, palpitations, nausea, vomiting, diarrhea, abdominal pain, fever or chills.  Patient currently appears in no acute distress.   Existing Precautions/Restrictions fall;spinal   Cognitive Status Examination   Orientation Status orientation to person, place and time   Affect/Mental Status (Cognitive) WNL   Follows Commands WNL   Visual Perception   Visual Impairment/Limitations corrective lenses full-time   Pain Assessment   Patient Currently in Pain Yes, see Vital Sign flowsheet   Range of Motion Comprehensive   General Range of Motion   (spinal precautions)   Strength Comprehensive (MMT)   General Manual Muscle Testing (MMT) Assessment no strength deficits identified   Coordination   Coordination Comments Pt ambulated in hallway ~80 ft w/ CGA, 4WW, & increased time. Pt requested wc rest of way to room (was visiting wife in her room).   Transfers   Transfers sit-stand transfer   Sit-Stand Transfer   Sit-Stand [place] (Transfers) minimum assist (75% patient effort)   Assistive Device (Sit-Stand Transfers) walker, 4-wheeled   Sit/Stand Transfer Comments from recliner   Balance   Balance Assessment standing static balance   Standing Balance: Static contact guard   Position/Device Used, Standing Balance walker, 4-wheeled   Activities of Daily Living   BADL Assessment/Intervention lower body dressing   Lower Body Dressing Assessment/Training   [place] Level (Lower Body Dressing) unable to assess  (unable to assess d/t pt reports use of sock aid/reacher @ baseline & not present.)   Clinical Impression   Criteria for Skilled Therapeutic Interventions Met (OT) Yes, treatment indicated   OT Diagnosis spinal precautions/weakness impacting functional mobility   OT Problem List-Impairments impacting ADL problems related to;activity tolerance impaired;mobility;pain;post-surgical precautions   Assessment of Occupational Performance 1-3 Performance Deficits   Identified Performance Deficits  trsfrs, toilet trsfr, LB dressing   Planned Therapy Interventions (OT) ADL retraining;bed mobility training;transfer training;strengthening   Clinical Decision Making Complexity (OT) problem focused assessment/low complexity   Risk & Benefits of therapy have been explained evaluation/treatment results reviewed;care plan/treatment goals reviewed   OT Total Evaluation Time   OT Eval, Moderate Complexity Minutes (08720) 10   Therapy Certification   Medical Diagnosis Midline Low back pain   Start of Care Date 05/20/24   Certification date from 05/20/24   Certification date to 05/27/24   OT Goals   Therapy Frequency (OT) Daily   OT Predicted Duration/Target Date for Goal Attainment 05/28/24   OT Goals Lower Body Dressing;Transfers;Bed Mobility;Toilet Transfer/Toileting   OT: Lower Body Dressing Supervision/stand-by assist;using adaptive equipment;within precautions   OT: Bed Mobility Supervision/stand-by assist;within precautions   OT: Transfer Supervision/stand-by assist;within precautions   OT: Toilet Transfer/Toileting Supervision/stand-by assist;within precautions   Interventions   Interventions Quick Adds Self-Care/Home Management   Self-Care/Home Management   Self-Care/Home Mgmt/ADL, Compensatory, Meal Prep Minutes (32584) 15   Symptoms Noted During/After Treatment (Meal Preparation/Planning Training) fatigue;shortness of breath   Treatment Detail/Skilled Intervention Eval completed, intervention started. OT reviewed spinal precautions w/ pt, pt reported understanding. Pt sit/stand from WC w/ CGA, vocal cues for safe body mechanics, & 4WW. Pt ambulated to recliner w/in room, ~20 ft w/ CGA, 4WW. Pt stand/sit in recliner w/ CGA, 4WW. Pt seated rest break, ~1 min w/ SBA - noted slight SOB. O2 following ambulation: 93%. Pt sit/stand w/ CGA, 4WW, & increased time. Pt ambulated to sink w/in room, ~15 ft w/ CGA, 4WW. Pt completed toothbrushing & hairbrushing routines @ sink w/ CGA, lean on sink for stabilization, & verbal  cues for safe body mechanics. Noted fatigue & slight SOB. Pt ambulated back to recliner, ~15 ft w/ CGA, 4WW. Pt stand/sit in recliner w/ CGA, 4WW, & verbal cues for safe body mechanics. Pt O2 following activity: ~94%. Pt chair alarm activated, call light w/in reach.   OT Discharge Planning   OT Plan AE for LB dressing, trsfrs, toilet trsfr   OT Discharge Recommendation (DC Rec) Transitional Care Facility   OT Rationale for DC Rec Pt is I w/ ADLs/IADLs @ baseline. Recommend TCU to increase safety w/ mobilization for ADLs/IADLs w/in spinal precautions prior to return home.   OT Brief overview of current status CGA   Total Session Time   Timed Code Treatment Minutes 15   Total Session Time (sum of timed and untimed services) 25    [facility]  OUTPATIENT OCCUPATIONAL THERAPY  EVALUATION  PLAN OF TREATMENT FOR OUTPATIENT REHABILITATION  (COMPLETE FOR INITIAL CLAIMS ONLY)  Patient's Last Name, First Name, M.I.  YOB: 1931  Familia Sales                          Provider's Name  [facility] Medical Record No.  [id number]                             Onset Date:  05/20/24   Start of Care Date:  (P) 05/20/24   Type:     ___PT   _X_OT   ___SLP Medical Diagnosis:  (P) Midline Low back pain                    OT Diagnosis:  spinal precautions/weakness impacting functional mobility Visits from SOC:  1     See note for plan of treatment, functional goals and certification details    I CERTIFY THE NEED FOR THESE SERVICES FURNISHED UNDER        THIS PLAN OF TREATMENT AND WHILE UNDER MY CARE     (Physician co-signature of this document indicates review and certification of the therapy plan).

## 2024-05-21 NOTE — PROGRESS NOTES
PRIMARY DIAGNOSIS: ACUTE PAIN  OUTPATIENT/OBSERVATION GOALS TO BE MET BEFORE DISCHARGE:  1. Pain Status: Improved but still requiring narcotics.    2. Return to near baseline physical activity: No    3. Cleared for discharge by consultants (if involved): No    Discharge Planner Nurse   Safe discharge environment identified: No  Barriers to discharge: Yes       Entered by: Lina Dockery RN 05/21/2024 6:14 AM    Pt A&O. VSS in RA. Constant pain on the back, PRN Oxycodone 5mg oral.     Please review provider order for any additional goals.   Nurse to notify provider when observation goals have been met and patient is ready for discharge.

## 2024-05-21 NOTE — PROGRESS NOTES
[facility]    Medicine Progress Note - Hospitalist Service    Date of Admission:  5/20/2024    Assessment & Plan      Famiila Sales is a 89 year old male with PMH significant for CAD, DM2, TESSY and chronic back pain who is admitted for observation on 5/20/2024 due to L1 compression fracture.  Patient presented  to the  ED due to intractable back pain.  Patient says that his chronic back pain worsened earlier this week.  [facility].  He states that has not helped his lower back pain.  Day of admission he had tried to help lift his wife who was on the ground further worsening his pain.     L1 compression fracture    -Lumbar spine x-ray-superior endplate L1 compression fracture with 20% vertebral body height loss, unchanged.  -Consult Neurosurgery no surgical intervention planned at this time.  Recommendations include avoid heavy lifting, bending, twisting.  Follow-up as outpatient  -As needed Tylenol, oxycodone, lidocaine patches  -PT/OT eval for home safety       Observation Goals: -diagnostic tests and consults completed and resulted, -vital signs normal or at patient baseline, -adequate pain control on oral analgesics, -returns to baseline functional status, -safe disposition plan has been identified, Nurse to notify provider when observation goals have been met and patient is ready for discharge.  Diet: Combination Diet Low Consistent Carb (45 g CHO per Meal) Diet; No Caffeine Diet, Low Saturated Fat Na <2400mg Diet    DVT Prophylaxis: DOAC  Kinney Catheter: Not present  Lines: None     Cardiac Monitoring: None  Code Status: Full Code      Clinically Significant Risk Factors Present on Admission           # Hypercalcemia: Highest Ca = 10.3 mg/dL in last 2 days, will monitor as appropriate     # Drug Induced Coagulation Defect: home medication list includes an anticoagulant medication        # DMII: A1C = N/A within past 6 months    # Overweight:  "Estimated body mass index is 25.87 kg/m  as calculated from the following:    Height as of this encounter: 1.753 m (5' 9\").    Weight as of this encounter: 79.5 kg (175 lb 3.2 oz).              Disposition Plan     Medically Ready for Discharge: Anticipated Tomorrow           The patient's care was discussed with the Attending Physician, Dr. Jackson .    Keila Beckwith NP  Hospitalist Service  [facility]  Securely message with [employer] (more info)  Text page via [employer] Paging/Directory   ______________________________________________________________________    Interval History       Physical Exam   Vital Signs: Temp: 97.8  F (36.6  C) Temp src: Oral BP: 135/64 Pulse: 59   Resp: 21 SpO2: 95 % O2 Device: None (Room air)    Weight: 175 lbs 3.2 oz    Constitutional: awake, alert, cooperative, no apparent distress, and appears stated age  Respiratory: No increased work of breathing, good air exchange, clear to auscultation bilaterally, no crackles or wheezing  Cardiovascular: Normal apical impulse, regular rate and rhythm, normal S1 and S2, no S3 or S4, and no murmur noted  GI: No scars, normal bowel sounds, soft, non-distended, non-tender, no masses palpated, no hepatosplenomegally    Medical Decision Making       40 MINUTES SPENT BY ME on the date of service doing chart review, history, exam, documentation & further activities per the note.  MANAGEMENT DISCUSSED with the following over the past 24 hours: Dr. Jackson   NOTE(S)/MEDICAL RECORDS REVIEWED over the past 24 hours: Dr. Jackson       Data         Imaging results reviewed over the past 24 hrs:   Recent Results (from the past 24 hour(s))   CT Pelvis Bone wo Contrast    Narrative    EXAM: CT PELVIS BONE WO CONTRAST  LOCATION: [facility]  DATE: 5/20/2024    INDICATION: Left hip pain. Arthritis, known or rule out. Osteoarthritis, no known or rule out fracture and not a surgical candidate.  COMPARISON: 2/1/2022 CT of " the abdomen/pelvis.  TECHNIQUE: CT scan of the pelvis was performed without IV contrast. Multiplanar reformats were obtained. Dose reduction techniques were used.  CONTRAST: None.    FINDINGS:    PELVIS ORGANS: No free fluid in the pelvis. Atherosclerotic vascular calcifications.    MUSCULOSKELETAL: Artifact from a left total hip replacement. Prior lumbosacral fusion and decompression. Generator seen within the soft tissues of the right gluteal region.    Bones are demineralized which limits detection for nondisplaced fractures. There are symmetric moderate arthritic change of the SI joints. Moderate right hip arthrosis. No dislocation. Contralateral left total hip replacement with artifact limiting   assessment at the bone/metallic interface. There is no evidence of an acute displaced fracture. There is some focal cortical buckling of the left inferior pubic ramus which is chronic, present on 2/1/2022 CT of abdomen and pelvis in retrospect.    There is some stranding about the proximal left hamstring musculature within the left posterior thigh.      Impression    IMPRESSION:  1.  Bones are demineralized. No evidence of an acute displaced fracture with attention to the left hip.    2.  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    3.  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    4.  Nonspecific stranding about the left proximal hamstring musculature in the posterior thigh can be seen with injury or blood products/poorly defined hematoma.   XR Lumbar Spine 2/3 Views    Narrative    EXAM: XR LUMBAR SPINE 2/3 VIEWS  LOCATION: [facility]  DATE: 5/20/2024    INDICATION: upright lumbar spine xray, evaluate lumbar spine fracture and low back pain  COMPARISON: None.      Impression    IMPRESSION:   5 lumbar-type vertebral bodies. Superior endplate L1 compression fracture with 20% vertebral body height loss, unchanged. Chronic superior plate of L2 compression fracture,  unchanged. Postsurgical changes posterior fusion at L4/L5. No hardware   complication. Anterior marginal osteophytes at L1/L2-L4/L5. Atherosclerotic vascular disease. Soft tissues otherwise unremarkable.

## 2024-05-21 NOTE — PROGRESS NOTES
[facility]  Neurosurgery Daily Progress Note    Assessment & Plan   Familia Sales is a 89 year old male with history of L4-5 fusion with Dr. Carvalho who was admitted on 5/20/2024 with acute on chronic low back pain s/p fall. Imaging reveals recent mild superior endplate compression fracture at L1 with approximately 20% height loss. Patient reports chronic left leg pain worsened after recent fall.     -Lumbar spine MRI ordered for further evaluation   -Avoid heavy lifting, bending, twisting  -Continue pain control measures as needed  -Likely plan to follow up with NSGY clinic in 3 weeks with repeat xray    -Appreciate assistance from specialties    Discussed with Dr. Christian Ortiz, CNP  [facility] Neurosurgery  Tel [phone]  Pager [phone]    Interval History   Patient was seen sitting in the chair. Reports continued bilateral low back pain. Reports chronic left leg pain worsened after recent fall. Patient describes leg pain is mostly located in left upper anterior thigh. Neuro exam stable.    Upright xray completed:     EXAM: XR LUMBAR SPINE 2/3 VIEWS  LOCATION: [facility]  DATE: 5/20/2024                                                            IMPRESSION:   5 lumbar-type vertebral bodies. Superior endplate L1 compression fracture with 20% vertebral body height loss, unchanged. Chronic superior plate of L2 compression fracture, unchanged. Postsurgical changes posterior fusion at L4/L5. No hardware complication. Anterior marginal osteophytes at L1/L2-L4/L5. Atherosclerotic vascular disease. Soft tissues otherwise unremarkable.    Physical Exam   Temp: 97.8  F (36.6  C) Temp src: Oral BP: 135/64 Pulse: 59   Resp: 21 SpO2: 95 % O2 Device: None (Room air)    Vitals:    05/20/24 0340 05/20/24 1504   Weight: 83.9 kg (185 lb) 79.5 kg (175 lb 3.2 oz)     Mental status:  Alert and Oriented x 3, speech is fluent, following commands   Motor:     Iliopsoas  (hip flexion)               Right: 5/5  Left:  5/5  Quadriceps  (knee extension)       Right:  5/5  Left:  5/5  Hamstrings  (knee flexion)            Right:  5/5  Left:  5/5  Gastroc Soleus  (PF)                          Right:  5/5  Left:  5/5  Tibialis Ant  (DF)                          Right:  5/5  Left:  5/5  EHL                          Right:  5/5  Left:  5/5    Sensation:  Intact to light touch in BLE   Reflexes:   Negative Clonus     Tenderness to palpation of the lumbar paraspinous muscles.

## 2024-05-21 NOTE — PROGRESS NOTES
PRIMARY DIAGNOSIS: ACUTE PAIN  OUTPATIENT/OBSERVATION GOALS TO BE MET BEFORE DISCHARGE:  1. Pain Status: Improved-controlled with oral pain medications.    2. Return to near baseline physical activity: Yes    3. Cleared for discharge by consultants (if involved): No    Discharge Planner Nurse   Safe discharge environment identified: No  Barriers to discharge: Yes       Entered by: Meg Stafford RN 05/21/2024 10:11 AM   Patient alert and oriented x4. Daughter is hoping to meet with SW for possible alternate discharge location. Patient has lidocaine patches on his lower back and left back hip/lower left back areas. Patches will need to be removed around 21:15 today. Last blood sugar was 222 at 07:32.

## 2024-05-21 NOTE — PROGRESS NOTES
PRIMARY DIAGNOSIS: ACUTE PAIN  OUTPATIENT/OBSERVATION GOALS TO BE MET BEFORE DISCHARGE:  1. Pain Status: Improved with use of alternative comfort measures i.e.: positioning, Rest    2. Return to near baseline physical activity: No    3. Cleared for discharge by consultants (if involved): No    Discharge Planner Nurse   Safe discharge environment identified: No  Barriers to discharge: Yes       Entered by: Lina Dockery RN 05/21/2024 3:40 AM    Pt VSS in RA. Back pain 4/10, declined pain meds. Strict bed rest, Uses urinal at bedside.      Please review provider order for any additional goals.   Nurse to notify provider when observation goals have been met and patient is ready for discharge.

## 2024-05-21 NOTE — PLAN OF CARE
Goal Outcome Evaluation:      Plan of Care Reviewed With: patient    Overall Patient Progress: no change    Patient alert and oriented x4. Patient had xray of his lumbar spine. Patient had CT of his pelvis - results are pending for both. Patient is on Room Air. Patient is not on Tele. Patient uses the urinal. Patient has his own red walker for mobility. Patient is on a carb diet with no caffeine. Patient's wife is here also - sliding scale Room .

## 2024-05-21 NOTE — PLAN OF CARE
Goal Outcome Evaluation:      Plan of Care Reviewed With: patient    Overall Patient Progress: no changeOverall Patient Progress: no change  Problem: Adult Inpatient Plan of Care  Goal: Optimal Comfort and Wellbeing  Intervention: Monitor Pain and Promote Comfort  Recent Flowsheet Documentation  Taken 5/21/2024 0339 by Lina Dockery RN  Pain Management Interventions:   medication offered but refused   rest  Taken 5/20/2024 2356 by Lnia Dockery RN  Pain Management Interventions:   repositioned   rest  Taken 5/20/2024 2102 by Lina Dockery RN  Pain Management Interventions: rest  Taken 5/20/2024 2045 by Lina Dockery RN  Pain Management Interventions:   repositioned   rest    Pt is A&O. VSS in RA. Pt have constant pain on the back. Given PRN tylenol & Oxycodone 5mg oral. On strict bedrest. Uses urinal at bedside. L arm PIV saline locked.     Lina Dockery RN

## 2024-05-21 NOTE — PROVIDER NOTIFICATION
Alerted Dr. Jackson via [facility] that patient will be unable to have an MRI due to his nerve stimulator. He must have access to the controller and have it fully charged or the stimulator needs to be removed. Patient states it is dead and wants it removed.

## 2024-05-22 ENCOUNTER — APPOINTMENT (OUTPATIENT)
Dept: OCCUPATIONAL THERAPY | Facility: HOSPITAL | Age: 89
End: 2024-05-22
Payer: MEDICARE

## 2024-05-22 ENCOUNTER — APPOINTMENT (OUTPATIENT)
Dept: PHYSICAL THERAPY | Facility: HOSPITAL | Age: 89
End: 2024-05-22
Payer: MEDICARE

## 2024-05-22 LAB
ANION GAP SERPL CALCULATED.3IONS-SCNC: 8 MMOL/L (ref 7–15)
BUN SERPL-MCNC: 16.9 MG/DL (ref 8–23)
CALCIUM SERPL-MCNC: 9.5 MG/DL (ref 8.2–9.6)
CHLORIDE SERPL-SCNC: 104 MMOL/L (ref 98–107)
CREAT SERPL-MCNC: 0.78 MG/DL (ref 0.67–1.17)
DEPRECATED HCO3 PLAS-SCNC: 25 MMOL/L (ref 22–29)
EGFRCR SERPLBLD CKD-EPI 2021: 83 ML/MIN/1.73M2
ERYTHROCYTE [DISTWIDTH] IN BLOOD BY AUTOMATED COUNT: 13.8 % (ref 10–15)
GLUCOSE BLDC GLUCOMTR-MCNC: 182 MG/DL (ref 70–99)
GLUCOSE BLDC GLUCOMTR-MCNC: 189 MG/DL (ref 70–99)
GLUCOSE BLDC GLUCOMTR-MCNC: 198 MG/DL (ref 70–99)
GLUCOSE BLDC GLUCOMTR-MCNC: 208 MG/DL (ref 70–99)
GLUCOSE BLDC GLUCOMTR-MCNC: 277 MG/DL (ref 70–99)
GLUCOSE SERPL-MCNC: 218 MG/DL (ref 70–99)
HCT VFR BLD AUTO: 37.6 % (ref 40–53)
HGB BLD-MCNC: 12.8 G/DL (ref 13.3–17.7)
MCH RBC QN AUTO: 31.9 PG (ref 26.5–33)
MCHC RBC AUTO-ENTMCNC: 34 G/DL (ref 31.5–36.5)
MCV RBC AUTO: 94 FL (ref 78–100)
PLATELET # BLD AUTO: 184 10E3/UL (ref 150–450)
POTASSIUM SERPL-SCNC: 4.1 MMOL/L (ref 3.4–5.3)
RBC # BLD AUTO: 4.01 10E6/UL (ref 4.4–5.9)
SODIUM SERPL-SCNC: 137 MMOL/L (ref 135–145)
WBC # BLD AUTO: 7.1 10E3/UL (ref 4–11)

## 2024-05-22 PROCEDURE — 85049 AUTOMATED PLATELET COUNT: CPT | Performed by: STUDENT IN AN ORGANIZED HEALTH CARE EDUCATION/TRAINING PROGRAM

## 2024-05-22 PROCEDURE — 80048 BASIC METABOLIC PNL TOTAL CA: CPT | Performed by: STUDENT IN AN ORGANIZED HEALTH CARE EDUCATION/TRAINING PROGRAM

## 2024-05-22 PROCEDURE — 97535 SELF CARE MNGMENT TRAINING: CPT | Mod: GO

## 2024-05-22 PROCEDURE — 250N000013 HC RX MED GY IP 250 OP 250 PS 637: Performed by: STUDENT IN AN ORGANIZED HEALTH CARE EDUCATION/TRAINING PROGRAM

## 2024-05-22 PROCEDURE — 97116 GAIT TRAINING THERAPY: CPT | Mod: GP

## 2024-05-22 PROCEDURE — G0378 HOSPITAL OBSERVATION PER HR: HCPCS

## 2024-05-22 PROCEDURE — 250N000013 HC RX MED GY IP 250 OP 250 PS 637

## 2024-05-22 PROCEDURE — 97110 THERAPEUTIC EXERCISES: CPT | Mod: GP

## 2024-05-22 PROCEDURE — 82962 GLUCOSE BLOOD TEST: CPT

## 2024-05-22 PROCEDURE — 36415 COLL VENOUS BLD VENIPUNCTURE: CPT | Performed by: STUDENT IN AN ORGANIZED HEALTH CARE EDUCATION/TRAINING PROGRAM

## 2024-05-22 PROCEDURE — 99232 SBSQ HOSP IP/OBS MODERATE 35: CPT | Performed by: STUDENT IN AN ORGANIZED HEALTH CARE EDUCATION/TRAINING PROGRAM

## 2024-05-22 PROCEDURE — 97530 THERAPEUTIC ACTIVITIES: CPT | Mod: GP

## 2024-05-22 RX ORDER — ACETAMINOPHEN 325 MG/1
975 TABLET ORAL EVERY 8 HOURS
Status: DISCONTINUED | OUTPATIENT
Start: 2024-05-22 | End: 2024-05-23 | Stop reason: HOSPADM

## 2024-05-22 RX ADMIN — FERROUS SULFATE TAB 325 MG (65 MG ELEMENTAL FE) 325 MG: 325 (65 FE) TAB at 08:23

## 2024-05-22 RX ADMIN — AMIODARONE HYDROCHLORIDE 200 MG: 200 TABLET ORAL at 08:23

## 2024-05-22 RX ADMIN — ACETAMINOPHEN 975 MG: 325 TABLET ORAL at 08:23

## 2024-05-22 RX ADMIN — APIXABAN 5 MG: 5 TABLET, FILM COATED ORAL at 19:31

## 2024-05-22 RX ADMIN — SERTRALINE HYDROCHLORIDE 50 MG: 50 TABLET ORAL at 08:23

## 2024-05-22 RX ADMIN — ACETAMINOPHEN 975 MG: 325 TABLET ORAL at 16:23

## 2024-05-22 RX ADMIN — APIXABAN 5 MG: 5 TABLET, FILM COATED ORAL at 08:23

## 2024-05-22 RX ADMIN — FEXOFENADINE HCL 180 MG: 180 TABLET ORAL at 08:24

## 2024-05-22 RX ADMIN — LIDOCAINE 2 PATCH: 4 PATCH TOPICAL at 09:54

## 2024-05-22 ASSESSMENT — ACTIVITIES OF DAILY LIVING (ADL)
ADLS_ACUITY_SCORE: 35
ADLS_ACUITY_SCORE: 30
ADLS_ACUITY_SCORE: 35
ADLS_ACUITY_SCORE: 30
ADLS_ACUITY_SCORE: 30
ADLS_ACUITY_SCORE: 35
ADLS_ACUITY_SCORE: 30
ADLS_ACUITY_SCORE: 35
ADLS_ACUITY_SCORE: 30
ADLS_ACUITY_SCORE: 35
ADLS_ACUITY_SCORE: 30
ADLS_ACUITY_SCORE: 35
ADLS_ACUITY_SCORE: 31
ADLS_ACUITY_SCORE: 30

## 2024-05-22 NOTE — UTILIZATION REVIEW
Admission Status; Secondary Review Determination     Under the authority of the Utilization Management Committee, the utilization review process indicated a secondary review on the above patient.  The review outcome is based on review of the medical records, discussions with staff, and applying clinical experience noted on the date of the review.       (x) Observation Status Appropriate - Current Stay Review     RATIONALE FOR DETERMINATION/SUMMARY:  89-year-old male with PMH L4-5 fusion, patient admitted due to traumatic L1 compression fracture s/p fall.  Imaging reveals recent mild superior endplate compression fracture at L1 with approximately 20% height loss.  Seen by neurosurgery.  Non-surgical management recommended.  Neurosurgery signing off course. Patient improving.    The severity of illness, intensity of service provided, expected LOS and risk for adverse outcome make the care appropriate for observation.     The information on this document is developed by the utilization review team in order for the business office to ensure compliance.  This only denotes the appropriateness of proper admission status and does not reflect the quality of care rendered.       The definitions of Inpatient Status and Observation Status used in making the determination above are those provided in the CMS Coverage Manual, Chapter 1 and Chapter 6, section 70.4.     Sincerely,    Scott Smith DO, [facility]  Utilization Review  Physician Advisor

## 2024-05-22 NOTE — PLAN OF CARE
PRIMARY DIAGNOSIS: ACUTE PAIN  OUTPATIENT/OBSERVATION GOALS TO BE MET BEFORE DISCHARGE:  1. Pain Status: Improved but still requiring IV narcotics.    2. Return to near baseline physical activity: No    3. Cleared for discharge by consultants (if involved): No    Discharge Planner Nurse   Safe discharge environment identified: Yes  Barriers to discharge: Yes       Entered by: Jesus Suarez RN 05/22/2024 5:00 AM     Please review provider order for any additional goals.   Nurse to notify provider when observation goals have been met and patient is ready for discharge.Goal Outcome Evaluation: pt is alert and oriented. Pt c/o pain to lower back was given prn with relief.

## 2024-05-22 NOTE — PROGRESS NOTES
Care Management Follow Up    Length of Stay (days): 0    Expected Discharge Date: 05/22/2024     Concerns to be Addressed: discharge planning     Patient plan of care discussed at interdisciplinary rounds: Yes    Anticipated Discharge Disposition:  Transitional care unit     Anticipated Discharge Services:  RN PT OT  Anticipated Discharge DME:  4 wheeled walker    Patient/family educated on Medicare website which has current facility and service quality ratings:  yes  Education Provided on the Discharge Plan:  yes  Patient/Family in Agreement with the Plan:  yes    Referrals Placed by CM/SW:    Private pay costs discussed: private room/amenity fees    Additional Information:  SW spoke to patient at bedside to review discharge recommendations. TCU is continuing to be recommended; pt unsure if able to privately pay for self and spouse who continues to need TCU; goal is home with home care but stated that he did not do well with OT. SW following for updated PT recommendations. Neuro surgery signed off at this time. Care management following for placement and left message for [facility] admissions.    Per [facility], patient is enrolled in [facility] however only SNF coverage is in a [facility] contracted facility under hospice care.    Brenna Kjellberg, BSW

## 2024-05-22 NOTE — PROGRESS NOTES
Patient states he uses a bipap at home. Doesn't have home machine here. Declined hospital machine. Patient aware that if he changes his mind and would like to use hospital machine he can let RN know and RN will contact RT.

## 2024-05-22 NOTE — PROGRESS NOTES
PRIMARY DIAGNOSIS: ACUTE PAIN  OUTPATIENT/OBSERVATION GOALS TO BE MET BEFORE DISCHARGE:  1. Pain Status: Improved-controlled with oral pain medications.    2. Return to near baseline physical activity: No    3. Cleared for discharge by consultants (if involved): No    Discharge Planner Nurse   Safe discharge environment identified: No  Barriers to discharge: Yes       Entered by: Meg Stafford RN 05/22/2024 11:49 AM     Patient alert and oriented x4. Patient awaits how to proceed on obtaining an MRI - waiting on Neurosurgery. Patient will need his lidocaine patches removed at 21:54. Patient is not currently complaining of back pain at present with medication management. Patient is an assist of 1 with gait belt and the use of his personal red walker. Last blood sugar was 277 at 11:28. Patient is not on tele. Patient is on Room Air.

## 2024-05-22 NOTE — PROGRESS NOTES
[facility]  Neurosurgery Daily Progress Note    Assessment & Plan   Familia Sales is a 89 year old male with history of L4-5 fusion with Dr. Carvalho who was admitted on 5/20/2024 with acute on chronic low back pain s/p fall. Imaging reveals recent mild superior endplate compression fracture at L1 with approximately 20% height loss. Patient reports improvement in back pain and left leg pain, describes as mild today.    -Avoid heavy lifting, bending, twisting  -Continue pain control measures as needed  -Follow up with NSGY clinic in 3 weeks with repeat xray    -Appreciate assistance from specialties  -NSGY will sign off, please call with questions    Discussed with Dr. Gonzales and  patient     Evangelinacorinne Rubinson, CNP  [facility] Neurosurgery  Tel [phone]  Pager [phone]    Interval History   Patient was seen sitting in the chair. Patient reports improvement in back pain and left leg pain, describes as mild today. Denies any new or worsening symptoms. Neuro exam stable. Lumbar spine MRI was previously ordered. Patient unable to have MRI due to nerve stimulator.     Upright xray completed:     EXAM: XR LUMBAR SPINE 2/3 VIEWS  LOCATION: [facility]  DATE: 5/20/2024                                                            IMPRESSION:   5 lumbar-type vertebral bodies. Superior endplate L1 compression fracture with 20% vertebral body height loss, unchanged. Chronic superior plate of L2 compression fracture, unchanged. Postsurgical changes posterior fusion at L4/L5. No hardware complication. Anterior marginal osteophytes at L1/L2-L4/L5. Atherosclerotic vascular disease. Soft tissues otherwise unremarkable.    Physical Exam   Temp: 97.6  F (36.4  C) Temp src: Oral BP: (!) 149/66 Pulse: 57   Resp: 20 SpO2: 96 % O2 Device: None (Room air)    Vitals:    05/20/24 0340 05/20/24 1504   Weight: 83.9 kg (185 lb) 79.5 kg (175 lb 3.2 oz)     Mental status:  Alert and Oriented  x 3, speech is fluent, following commands   Motor:    Iliopsoas  (hip flexion)               Right: 5/5  Left:  5/5  Quadriceps  (knee extension)       Right:  5/5  Left:  5/5  Hamstrings  (knee flexion)            Right:  5/5  Left:  5/5  Gastroc Soleus  (PF)                          Right:  5/5  Left:  5/5  Tibialis Ant  (DF)                          Right:  5/5  Left:  5/5  EHL                          Right:  5/5  Left:  5/5    Sensation:  Intact to light touch in BLE   Reflexes:   Negative Clonus     No tenderness to palpation of the lumbar spine.

## 2024-05-22 NOTE — PROGRESS NOTES
[facility]    Medicine Progress Note - Hospitalist Service    Date of Admission:  5/20/2024    Assessment & Plan   Familia Saels is a 89 year old male with PMH significant for CAD, DM2, TESSY and chronic back pain who is admitted for observation on 5/20/2024 due to L1 compression fracture.  Patient presented  to the  ED due to intractable back pain.  Patient says that his chronic back pain worsened earlier this week.  [facility].  He states that has not helped his lower back pain.  Day of admission he had tried to help lift his wife who was on the ground further worsening his pain.      L1 compression fracture    -Lumbar spine x-ray-superior endplate L1 compression fracture with 20% vertebral body height loss, unchanged.  -Consult Neurosurgery no surgical intervention planned at this time.    -Recommendations include avoid heavy lifting, bending, twisting.  Follow-up as outpatient in 3 weeks with repeat X -ray  -As needed Tylenol, oxycodone, lidocaine patches  -PT/OT recommend TCU, will re-eval    DM2:   HbA1c 8.2  Not on meds at home  Sliding scale insulin high  Goal HbA1c ~ 8 according to age    Normocytic anemia  Hb 12.8, stable    CAD   s/p JOANNA to LAD 02/2014    A.fibrillation  Rate controlled  PTA Eliquis, Amiodarone           Observation Goals: -diagnostic tests and consults completed and resulted, -vital signs normal or at patient baseline, -adequate pain control on oral analgesics, -returns to baseline functional status, -safe disposition plan has been identified, Nurse to notify provider when observation goals have been met and patient is ready for discharge.  Diet: Combination Diet Low Consistent Carb (45 g CHO per Meal) Diet; No Caffeine Diet, Low Saturated Fat Na <2400mg Diet    DVT Prophylaxis: DOAC  Kinney Catheter: Not present  Lines: None     Cardiac Monitoring: None  Code Status: Full Code      Clinically Significant Risk Factors  "Present on Admission               # Drug Induced Coagulation Defect: home medication list includes an anticoagulant medication        # DMII: A1C = 8.2 % (Ref range: <5.7 %) within past 6 months    # Overweight: Estimated body mass index is 25.87 kg/m  as calculated from the following:    Height as of this encounter: 1.753 m (5' 9\").    Weight as of this encounter: 79.5 kg (175 lb 3.2 oz).              Disposition Plan     Medically Ready for Discharge: Anticipated Tomorrow             Hanane Jackson MD  Hospitalist Service  [facility]  Securely message with Social Pulse (more info)  Text page via [employer] Paging/Directory   ______________________________________________________________________    Interval History   Was examined at the bedside, states he is doing much better today.  PT recommend TCU, will reevaluate if patient will be able to go home.  Anticipate discharge tomorrow    Physical Exam   Vital Signs: Temp: 97.6  F (36.4  C) Temp src: Oral BP: (!) 149/66 Pulse: 57   Resp: 20 SpO2: 96 % O2 Device: None (Room air)    Weight: 175 lbs 3.2 oz    Constitutional: awake, alert, cooperative, no apparent distress, and appears stated age  Respiratory: No increased work of breathing, good air exchange, clear to auscultation bilaterally, no crackles or wheezing  Cardiovascular: Normal apical impulse, regular rate and rhythm, normal S1 and S2, no S3 or S4, and no murmur noted  GI: No scars, normal bowel sounds, soft, non-distended, non-tender, no masses palpated, no hepatosplenomegaly    Medical Decision Making       40 MINUTES SPENT BY ME on the date of service doing chart review, history, exam, documentation & further activities per the note.      Data     I have personally reviewed the following data over the past 24 hrs:    7.1  \   12.8 (L)   / 184     137 104 16.9 /  198 (H)   4.1 25 0.78 \     TSH: N/A T4: N/A A1C: N/A       Imaging results reviewed over the past 24 hrs:   No results found " for this or any previous visit (from the past 24 hour(s)).

## 2024-05-22 NOTE — PROGRESS NOTES
PRIMARY DIAGNOSIS: ACUTE PAIN  OUTPATIENT/OBSERVATION GOALS TO BE MET BEFORE DISCHARGE:  1. Pain Status: Improved-controlled with oral pain medications.    2. Return to near baseline physical activity: No    3. Cleared for discharge by consultants (if involved): No    Discharge Planner Nurse   Safe discharge environment identified: Yes  Barriers to discharge: Yes       Entered by: Jesus Suarez RN 05/22/2024 4:55 AM     Please review provider order for any additional goals.   Nurse to notify provider when observation goals have been met and patient is ready for discharge.  Pt is alert and oriented. Pt c/o pain to lower back. Given prn with relief.

## 2024-05-23 ENCOUNTER — APPOINTMENT (OUTPATIENT)
Dept: OCCUPATIONAL THERAPY | Facility: HOSPITAL | Age: 89
End: 2024-05-23
Payer: MEDICARE

## 2024-05-23 VITALS
BODY MASS INDEX: 25.95 KG/M2 | OXYGEN SATURATION: 99 % | TEMPERATURE: 97.8 F | DIASTOLIC BLOOD PRESSURE: 67 MMHG | SYSTOLIC BLOOD PRESSURE: 164 MMHG | HEART RATE: 60 BPM | RESPIRATION RATE: 16 BRPM | WEIGHT: 175.2 LBS | HEIGHT: 69 IN

## 2024-05-23 PROBLEM — K59.09 OTHER CONSTIPATION: Status: ACTIVE | Noted: 2024-05-23

## 2024-05-23 LAB
GLUCOSE BLDC GLUCOMTR-MCNC: 172 MG/DL (ref 70–99)
GLUCOSE BLDC GLUCOMTR-MCNC: 208 MG/DL (ref 70–99)
GLUCOSE BLDC GLUCOMTR-MCNC: 214 MG/DL (ref 70–99)
GLUCOSE BLDC GLUCOMTR-MCNC: 239 MG/DL (ref 70–99)

## 2024-05-23 PROCEDURE — 97535 SELF CARE MNGMENT TRAINING: CPT | Mod: GO

## 2024-05-23 PROCEDURE — 250N000013 HC RX MED GY IP 250 OP 250 PS 637

## 2024-05-23 PROCEDURE — 250N000013 HC RX MED GY IP 250 OP 250 PS 637: Performed by: STUDENT IN AN ORGANIZED HEALTH CARE EDUCATION/TRAINING PROGRAM

## 2024-05-23 PROCEDURE — 99239 HOSP IP/OBS DSCHRG MGMT >30: CPT | Performed by: STUDENT IN AN ORGANIZED HEALTH CARE EDUCATION/TRAINING PROGRAM

## 2024-05-23 PROCEDURE — G0378 HOSPITAL OBSERVATION PER HR: HCPCS

## 2024-05-23 PROCEDURE — 82962 GLUCOSE BLOOD TEST: CPT

## 2024-05-23 RX ORDER — POLYETHYLENE GLYCOL 3350 17 G/17G
17 POWDER, FOR SOLUTION ORAL DAILY
Status: DISCONTINUED | OUTPATIENT
Start: 2024-05-23 | End: 2024-05-23 | Stop reason: HOSPADM

## 2024-05-23 RX ORDER — POLYETHYLENE GLYCOL 3350 17 G/17G
17 POWDER, FOR SOLUTION ORAL DAILY PRN
Qty: 116 G | Refills: 0 | Status: SHIPPED | OUTPATIENT
Start: 2024-05-23

## 2024-05-23 RX ORDER — METHOCARBAMOL 500 MG/1
500 TABLET, FILM COATED ORAL EVERY 8 HOURS
Qty: 30 TABLET | Refills: 0 | Status: SHIPPED | OUTPATIENT
Start: 2024-05-23 | End: 2024-06-02

## 2024-05-23 RX ORDER — ACETAMINOPHEN 325 MG/1
650 TABLET ORAL EVERY 8 HOURS
Qty: 60 TABLET | Refills: 0 | Status: SHIPPED | OUTPATIENT
Start: 2024-05-24 | End: 2024-06-03

## 2024-05-23 RX ORDER — OXYCODONE HYDROCHLORIDE 5 MG/1
2.5 TABLET ORAL EVERY 4 HOURS PRN
Qty: 5 TABLET | Refills: 0 | Status: SHIPPED | OUTPATIENT
Start: 2024-05-23

## 2024-05-23 RX ORDER — SENNOSIDES 8.6 MG
8.6 TABLET ORAL 2 TIMES DAILY PRN
Status: DISCONTINUED | OUTPATIENT
Start: 2024-05-23 | End: 2024-05-23 | Stop reason: HOSPADM

## 2024-05-23 RX ADMIN — SODIUM PHOSPHATE, DIBASIC AND SODIUM PHOSPHATE, MONOBASIC 1 ENEMA: 7; 19 ENEMA RECTAL at 14:16

## 2024-05-23 RX ADMIN — FEXOFENADINE HCL 180 MG: 180 TABLET ORAL at 08:37

## 2024-05-23 RX ADMIN — AMIODARONE HYDROCHLORIDE 200 MG: 200 TABLET ORAL at 08:38

## 2024-05-23 RX ADMIN — ACETAMINOPHEN 975 MG: 325 TABLET ORAL at 15:51

## 2024-05-23 RX ADMIN — POLYETHYLENE GLYCOL 3350 17 G: 17 POWDER, FOR SOLUTION ORAL at 11:37

## 2024-05-23 RX ADMIN — ACETAMINOPHEN 975 MG: 325 TABLET ORAL at 08:38

## 2024-05-23 RX ADMIN — APIXABAN 5 MG: 5 TABLET, FILM COATED ORAL at 08:37

## 2024-05-23 RX ADMIN — SERTRALINE HYDROCHLORIDE 50 MG: 50 TABLET ORAL at 08:37

## 2024-05-23 RX ADMIN — ACETAMINOPHEN 975 MG: 325 TABLET ORAL at 00:03

## 2024-05-23 RX ADMIN — FERROUS SULFATE TAB 325 MG (65 MG ELEMENTAL FE) 325 MG: 325 (65 FE) TAB at 08:37

## 2024-05-23 RX ADMIN — SENNOSIDES 8.6 MG: 8.6 TABLET, FILM COATED ORAL at 11:37

## 2024-05-23 ASSESSMENT — ACTIVITIES OF DAILY LIVING (ADL)
ADLS_ACUITY_SCORE: 35
ADLS_ACUITY_SCORE: 31
ADLS_ACUITY_SCORE: 31
ADLS_ACUITY_SCORE: 35
ADLS_ACUITY_SCORE: 31
ADLS_ACUITY_SCORE: 35
ADLS_ACUITY_SCORE: 31
ADLS_ACUITY_SCORE: 35
ADLS_ACUITY_SCORE: 31

## 2024-05-23 NOTE — PLAN OF CARE
Occupational Therapy Discharge Summary    Reason for therapy discharge:    Discharged to home with home OT    Progress towards therapy goal(s). See goals on Care Plan in [facility] electronic health record for goal details.  Goals partially met.  Barriers to achieving goals:   discharge from facility.    Therapy recommendation(s):    Continued therapy is recommended.  Rationale/Recommendations:  decreased ADLs.    Goal Outcome Evaluation:         Mary Reyes, OTR/L  5/23/2024

## 2024-05-23 NOTE — PROGRESS NOTES
Care Management Discharge Note    Discharge Date: 05/23/2024       Discharge Disposition:  home with home care  Discharge Services:  RN PT OT [facility]    Discharge DME:  n/a    Discharge Transportation: family or friend will provide    Education Provided on the Discharge Plan: yes   Persons Notified of Discharge Plans: yes  Patient/Family in Agreement with the Plan: yes      Handoff Referral Completed: Yes    Additional Information:  Therapy recommending home care; pt agreeable and stated that there is no preference on agency. Referral made for RN PT [facility].  met with pt to discuss, and confirm discharge plan to home with home care services; RN PT OT [facility]. All parties aware, and agreeable to discharge plan. [facility] intake notified of discharge and accepted. No other needs from  at this time. Family to transport.  10:49 AM    Brenna Kjellberg, BSW LSW  5/23/2024

## 2024-05-23 NOTE — DISCHARGE SUMMARY
[facility]    Hospitalist Discharge Summary       Date of Admission:  5/20/2024  Date of Discharge:  05/23/24  Discharging Provider: Hanane Jackson MD      Discharge Diagnoses   L1 compression fracture  DM2  Normocytic anemia  CAD  A-fib    Follow-ups Needed After Discharge   Follow-up Appointments     Follow-up and recommended labs and tests       Please follow up at [facility] in 3 weeks with   xray prior to appt .  You may call the clinic with any scheduling questions or concerns.    [facility]  Tel [phone]        Follow-up and recommended labs and tests       Follow up with primary care provider, Josefa Saleh, within 7-14 days   for hospital follow- up.  No follow up labs or test are needed.    Call our scheduling line at [phone] to make an   appointment, if you do not already have one scheduled            Unresulted Labs Ordered in the Past 30 Days of this Admission       No orders found from 4/20/2024 to 5/21/2024.            Hospital Course   Familia Sales is a 89 year old male with PMH significant for CAD, DM2, TESSY and chronic back pain who is admitted for observation on 5/20/2024 due to L1 compression fracture.    L1 compression fracture    -Lumbar spine x-ray-superior endplate L1 compression fracture with 20% vertebral body height loss, unchanged.  -Seen by Neurosurgery, no surgical intervention  -Recommendations include avoid heavy lifting, bending, twisting.  -Follow-up as outpatient in 3 weeks with repeat X -ray with Neurosurgery  -Tylenol, lidocaine patches, Robaxin and as needed Oxycodone. Miralax for constipation  -PT/OT initially recommended TCU, patient pain improved significantly and was doing better and going home with home care PT/OT/RN/HHA     DM2:   HbA1c 8.2  Not on meds at home  Goal HbA1c ~ 8 according to age    Normocytic anemia  Hb 12.8, stable    CAD   s/p JOANNA to LAD 02/2014  Not on  asa    A.fibrillation  Rate controlled  PTA Eliquis, Amiodarone    Constipation  No BM x 4 days. Required only 2 doses of opioids  Senna, Miralax given. Ordered Enema  Discharged on Miralax prn at home    Consultations This Hospital Stay   NEUROSURGERY IP CONSULT  CARE MANAGEMENT / SOCIAL WORK IP CONSULT  PHYSICAL THERAPY ADULT IP CONSULT  OCCUPATIONAL THERAPY ADULT IP CONSULT    Code Status   Full Code    Time Spent on this Encounter   I,Hanane Jackson, personally saw the patient today and spent approximately 35 minutes discharging this patient.       Hanane Jackson MD  [facility]  ______________________________________________________________________    Physical Exam   Vital Signs: Temp: 97.5  F (36.4  C) Temp src: Oral BP: (!) 143/65 Pulse: 62   Resp: 15 SpO2: 97 % O2 Device: None (Room air)    Weight: 175 lbs 3.2 oz    Physical Exam      Constitutional: awake, alert, cooperative, no apparent distress, and appears stated age  Respiratory: No increased work of breathing, good air exchange, clear to auscultation bilaterally, no crackles or wheezing  Cardiovascular: Normal apical impulse, regular rate and rhythm, normal S1 and S2, no S3 or S4, and no murmur noted  GI: No scars, normal bowel sounds, soft, non-distended, non-tender, no masses palpated, no hepatosplenomegaly    Primary Care Physician   Josefa Saleh    Discharge Disposition   Discharged to home  Condition at discharge: Stable    Significant Results and Procedures   Most Recent 3 CBC's:  Recent Labs   Lab Test 05/22/24  0732 05/20/24  0519 12/13/23  0533 12/11/23  0531 12/10/23  0648   WBC 7.1 7.4  --  4.5 5.8   HGB 12.8* 12.8*  --   --  13.6   MCV 94 92  --   --  94    189 176  --  157     Most Recent 3 BMP's:  Recent Labs   Lab Test 05/23/24  1128 05/23/24  0819 05/23/24  0247 05/22/24  1128 05/22/24  0732 05/20/24  0745 05/20/24  0519 12/11/23  0833 12/11/23  0531   NA  --   --   --   --  137  --  138  --  139    POTASSIUM  --   --   --   --  4.1  --  4.3  --  4.2   CHLORIDE  --   --   --   --  104  --  104  --  105   CO2  --   --   --   --  25  --  24  --  26   BUN  --   --   --   --  16.9  --  20.8  --  16.1   CR  --   --   --   --  0.78  --  0.84  --  0.84   ANIONGAP  --   --   --   --  8  --  10  --  8   VINAY  --   --   --   --  9.5  --  10.3*  --  10.0*   * 214* 208*   < > 218*   < > 213*   < > 211*    < > = values in this interval not displayed.     Most Recent 2 LFT's:  Recent Labs   Lab Test 12/11/23  0531 12/10/23  0648   AST 25 30   ALT 35 42   ALKPHOS 80 90   BILITOTAL 0.5 0.8     Most Recent 3 INR's:  Recent Labs   Lab Test 11/14/19  1657   INR 1.10     Most Recent 3 Troponin's:  Recent Labs   Lab Test 03/19/21  1645   TROPI 0.024     Most Recent 3 BNP's:No lab results found.  Most Recent D-dimer:No lab results found.  Most Recent Cholesterol Panel:  Recent Labs   Lab Test 11/07/16  0915   CHOL 219*   *   HDL 49   TRIG 121       Results for orders placed or performed during the hospital encounter of 05/20/24   CT Lumbar Spine w/o Contrast    Narrative    EXAM: CT LUMBAR SPINE W/O CONTRAST  LOCATION: [facility]  DATE: 5/20/2024    INDICATION: Severe low back pain after trying to lift wife off floor.  COMPARISON: CT abdomen and pelvis 2/1/2022.  TECHNIQUE: Routine CT Lumbar Spine without IV contrast. Multiplanar reformats. Dose reduction techniques were used.     FINDINGS:  VERTEBRA: Postsurgical changes of anterior and posterior fusion at the L4-L5 level. The fusion hardware is intact without evidence of loosening. The bones are demineralized which can limit detection of acute fractures. There is a mild recent appearing   superior endplate compression deformity at the L1 level resulting in approximate 20% height loss centrally. There is chronic height loss along the superior endplate at the L2 level. There are stable alterations in the lateral alignment.     CANAL/FORAMINA:  Posterior decompression at the L3-L4 level. Advanced multilevel degenerative changes. High-grade foraminal narrowing on the right at L1-L2.    PARASPINAL: Diffuse atheromatous changes in the distal aorta.      Impression    IMPRESSION:  1.  Recent appearing mild superior endplate compression fracture at L1 with approximate 20% height loss.  2.  Chronic superior endplate compression fracture at L2.  3.  Postsurgical changes of anterior and posterior fusion at L4-L5.                 CT Thoracic Spine w/o Contrast    Narrative    EXAM: CT THORACIC SPINE W/O CONTRAST  LOCATION: [facility]  DATE: 5/20/2024    INDICATION: Severe low back pain after trying to lift wife off floor.  COMPARISON: CT abdomen and pelvis 2/1/2022. CT cervical spine 9/6/2023.  TECHNIQUE: Routine CT Thoracic Spine without IV contrast. Multiplanar reformats. Dose reduction techniques were used.     FINDINGS:  VERTEBRA: The bones are demineralized which can limit detection of acute fractures. Chronic mild superior endplate compression deformity at T1. Generalized mild height loss at T7. Chronic height loss at T8 and T9. Chronic superior endplate compression   deformity at T12. Chronic height loss along the superior and inferior endplates at T11. No convincing acute fracture.     CANAL/FORAMINA: Spinal stimulator device enters through the posterior interspace at T10-T11 and terminates at the T8 level. No appreciable central canal or high-grade foraminal stenosis.    PARASPINAL: Moderate hiatal hernia.      Impression    IMPRESSION:  1.  No convincing acute thoracic spine fracture or subluxation.  2.  Moderate hiatal hernia.               XR Lumbar Spine 2/3 Views    Narrative    EXAM: XR LUMBAR SPINE 2/3 VIEWS  LOCATION: [facility]  DATE: 5/20/2024    INDICATION: upright lumbar spine xray, evaluate lumbar spine fracture and low back pain  COMPARISON: None.      Impression    IMPRESSION:   5 lumbar-type  vertebral bodies. Superior endplate L1 compression fracture with 20% vertebral body height loss, unchanged. Chronic superior plate of L2 compression fracture, unchanged. Postsurgical changes posterior fusion at L4/L5. No hardware   complication. Anterior marginal osteophytes at L1/L2-L4/L5. Atherosclerotic vascular disease. Soft tissues otherwise unremarkable.   CT Pelvis Bone wo Contrast    Narrative    EXAM: CT PELVIS BONE WO CONTRAST  LOCATION: [facility]  DATE: 5/20/2024    INDICATION: Left hip pain. Arthritis, known or rule out. Osteoarthritis, no known or rule out fracture and not a surgical candidate.  COMPARISON: 2/1/2022 CT of the abdomen/pelvis.  TECHNIQUE: CT scan of the pelvis was performed without IV contrast. Multiplanar reformats were obtained. Dose reduction techniques were used.  CONTRAST: None.    FINDINGS:    PELVIS ORGANS: No free fluid in the pelvis. Atherosclerotic vascular calcifications.    MUSCULOSKELETAL: Artifact from a left total hip replacement. Prior lumbosacral fusion and decompression. Generator seen within the soft tissues of the right gluteal region.    Bones are demineralized which limits detection for nondisplaced fractures. There are symmetric moderate arthritic change of the SI joints. Moderate right hip arthrosis. No dislocation. Contralateral left total hip replacement with artifact limiting   assessment at the bone/metallic interface. There is no evidence of an acute displaced fracture. There is some focal cortical buckling of the left inferior pubic ramus which is chronic, present on 2/1/2022 CT of abdomen and pelvis in retrospect.    There is some stranding about the proximal left hamstring musculature within the left posterior thigh.      Impression    IMPRESSION:  1.  Bones are demineralized. No evidence of an acute displaced fracture with attention to the left hip.    2.  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    3.  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    4.  Nonspecific stranding about the left proximal hamstring musculature in the posterior thigh can be seen with injury or blood products/poorly defined hematoma.       Discharge Orders      XR Lumbar Spine 2/3 Views     Home Care Referral      Follow-up and recommended labs and tests     Please follow up at [facility] in 3 weeks with xray prior to appt .  You may call the clinic with any scheduling questions or concerns.    [facility]  Tel [phone]     Activity    Avoid heavy lifting, bending, twisting.   Avoid strenuous, jostling, jarring activities.   Call our clinic if symptoms change or worsen.     Reason for your hospital stay    L1 compression fracture     Follow-up and recommended labs and tests     Follow up with primary care provider, Josefa Saleh, within 7-14 days for hospital follow- up.  No follow up labs or test are needed.    Call our scheduling line at [phone] to make an   appointment, if you do not already have one scheduled     Diet    Follow this diet upon discharge:       Combination Diet Low Consistent Carb (45 g CHO per Meal) Diet     Discharge Medications   Current Discharge Medication List        START taking these medications    Details   methocarbamol (ROBAXIN) 500 MG tablet Take 1 tablet (500 mg) by mouth every 8 hours for 10 days  Qty: 30 tablet, Refills: 0    Associated Diagnoses: Closed compression fracture of L1 vertebra, initial encounter (H); Midline low back pain, unspecified chronicity, unspecified whether sciatica present      oxyCODONE (ROXICODONE) 5 MG tablet Take 0.5 tablets (2.5 mg) by mouth every 4 hours as needed for moderate to severe pain  Qty: 5 tablet, Refills: 0    Associated Diagnoses: Closed compression fracture of L1 vertebra, initial encounter (H); Midline low back pain, unspecified chronicity, unspecified whether sciatica present      polyethylene glycol  (MIRALAX) 17 GM/Dose powder Take 17 g by mouth daily as needed for constipation  Qty: 116 g, Refills: 0    Associated Diagnoses: Other constipation           CONTINUE these medications which have CHANGED    Details   acetaminophen (TYLENOL) 325 MG tablet Take 2 tablets (650 mg) by mouth every 8 hours for 10 days  Qty: 60 tablet, Refills: 0    Associated Diagnoses: Closed compression fracture of L1 vertebra, initial encounter (H); Midline low back pain, unspecified chronicity, unspecified whether sciatica present           CONTINUE these medications which have NOT CHANGED    Details   amiodarone (PACERONE) 200 MG tablet Take 200 mg by mouth daily      ammonium lactate (LAC-HYDRIN) 12 % external lotion Apply topically daily as needed for dry skin      carboxymethylcellulose (REFRESH PLUS) 0.5 % SOLN ophthalmic solution Place 1 drop into both eyes daily as needed for dry eyes      [employer] GLUCOSAMINE-CHONDROIT-MSM PO 1 tablet daily      diclofenac (VOLTAREN) 1 % topical gel Apply topically daily as needed      ELIQUIS ANTICOAGULANT 5 MG tablet Take 5 mg by mouth 2 times daily      ferrous sulfate (FE TABS) 325 (65 Fe) MG EC tablet Take 325 mg by mouth daily      fexofenadine (ALLEGRA) 180 MG tablet Take 180 mg by mouth daily      fish oil-omega-3 fatty acids 500 MG capsule Take 1 capsule by mouth daily      Flaxseed, Linseed, (FLAX SEED OIL) 1000 MG capsule Take by mouth daily      hydrocortisone (CORTAID) 1 % external ointment Apply topically 2 times daily as needed      ketoconazole (NIZORAL) 2 % external shampoo Shampoo scalp 3 times weekly      lidocaine (LIDODERM) 5 % patch Place 2 patches onto the skin daily as needed for moderate pain To prevent lidocaine toxicity, patient should be patch free for 12 hrs daily.      olopatadine (PATANOL) 0.1 % ophthalmic solution Place 1 drop into both eyes 2 times daily prn      sertraline (ZOLOFT) 50 MG tablet Take 50 mg by mouth daily       VYNDAQEL 20 MG Take 1 capsule by mouth  daily      nitroGLYcerin (NITROSTAT) 0.4 MG sublingual tablet Place 0.4 mg under the tongue every 5 minutes as needed for chest pain For chest pain place 1 tablet under the tongue every 5 minutes for 3 doses. If symptoms persist 5 minutes after 1st dose call 911.           Allergies   Allergies   Allergen Reactions    Codeine     Duloxetine     Dye [Contrast Dye]      ONE REACTION TO INJECTED DYE IN SHOULDER, ABOUT MAY 1965.    Morphine     Morphine And Codeine Itching    Motrin [Ibuprofen Micronized] Itching    Tagamet Itching    Vicodin [Hydrocodone-Acetaminophen]      EXTREME SWEATING, FLUSHING AND LIGHT-HEADEDNESS.      Hay Fever & [A.R.M.]

## 2024-05-23 NOTE — PROGRESS NOTES
PRIMARY DIAGNOSIS: ACUTE PAIN  OUTPATIENT/OBSERVATION GOALS TO BE MET BEFORE DISCHARGE:  1. Pain Status: Improved-controlled with oral pain medications.    2. Return to near baseline physical activity: Yes    3. Cleared for discharge by consultants (if involved): Yes    Discharge Planner Nurse   Safe discharge environment identified: Yes  Barriers to discharge: No       Entered by: Miranda Castillo RN 05/23/2024 10:13 AM     Please review provider order for any additional goals.   Nurse to notify provider when observation goals have been met and patient is ready for discharge.

## 2024-05-23 NOTE — PLAN OF CARE
PRIMARY DIAGNOSIS: ACUTE PAIN  OUTPATIENT/OBSERVATION GOALS TO BE MET BEFORE DISCHARGE:  1. Pain Status: Improved-controlled with oral pain medications.    2. Return to near baseline physical activity: No    3. Cleared for discharge by consultants (if involved): No    Discharge Planner Nurse   Safe discharge environment identified: No  Barriers to discharge: Yes       Entered by: Josefa Boogie RN 05/23/2024 4:37 AM     Please review provider order for any additional goals.   Nurse to notify provider when observation goals have been met and patient is ready for discharge.Goal Outcome Evaluation:

## 2024-05-23 NOTE — PLAN OF CARE
Problem: Adult Inpatient Plan of Care  Goal: Patient-Specific Goal (Individualized)  Outcome: Adequate for Care Transition   Goal Outcome Evaluation:  Patient discharged to home via Private Car.  Accompanied by friend and staff.  Discharge instructions were reviewed with patient, opportunity offered to ask questions.    Prescriptions e-prescribed to pharmacy.  Access discontinued: Yes  Care plan and education discontinued: Yes  Belongings were sent home with patient/family:  Cell phone/electronics:  , Clothing: Shirt(s):  , Pants:  , and Underclothes:  , and Shoes:   .     Patient is being picked up by a neighbor who will take him home.

## 2024-05-23 NOTE — PROGRESS NOTES
PRIMARY DIAGNOSIS: ACUTE PAIN  OUTPATIENT/OBSERVATION GOALS TO BE MET BEFORE DISCHARGE:  1. Pain Status: Improved-controlled with oral pain medications.    2. Return to near baseline physical activity: No    3. Cleared for discharge by consultants (if involved): No    Discharge Planner Nurse   Safe discharge environment identified: No  Barriers to discharge: Yes       Entered by: Meg Stafford RN 05/22/2024 7:53 PM   Patient will not be having an MRI anymore. Patient remains an assist of one with gait belt and walker. Last blood sugar was 198 at 16:02. Lidocaine patches provide back relief in conjunction with PO medications.

## 2024-05-23 NOTE — PLAN OF CARE
"  Problem: Adult Inpatient Plan of Care  Goal: Patient-Specific Goal (Individualized)  Description: You can add care plan individualizations to a care plan. Examples of Individualization might be:  \"Parent requests to be called daily at 9am for status\", \"I have a hard time hearing out of my right ear\", or \"Do not touch me to wake me up as it startles  me\".  Outcome: Progressing     Problem: Adult Inpatient Plan of Care  Goal: Absence of Hospital-Acquired Illness or Injury  Outcome: Progressing  Intervention: Prevent Skin Injury  Recent Flowsheet Documentation  Taken 5/23/2024 0840 by Miranda Castillo RN  Body Position: position changed independently     Problem: Adult Inpatient Plan of Care  Goal: Optimal Comfort and Wellbeing  Outcome: Progressing  Intervention: Monitor Pain and Promote Comfort  Recent Flowsheet Documentation  Taken 5/23/2024 0838 by Miranda Castillo RN  Pain Management Interventions: medication (see MAR)     Problem: Adult Inpatient Plan of Care  Goal: Readiness for Transition of Care  Outcome: Progressing   Goal Outcome Evaluation:                        "

## 2024-05-23 NOTE — PROGRESS NOTES
PRIMARY DIAGNOSIS: ACUTE PAIN  OUTPATIENT/OBSERVATION GOALS TO BE MET BEFORE DISCHARGE:  1. Pain Status: Improved-controlled with oral pain medications.    2. Return to near baseline physical activity: Yes    3. Cleared for discharge by consultants (if involved): Yes    Discharge Planner Nurse   Safe discharge environment identified: Yes  Barriers to discharge: Yes       Entered by: Miranda Castillo RN 05/23/2024 2:30 PM     Please review provider order for any additional goals.   Nurse to notify provider when observation goals have been met and patient is ready for discharge.

## 2024-05-23 NOTE — PLAN OF CARE
Physical Therapy Discharge Summary    Reason for therapy discharge:    Discharged to home with home therapy.    Progress towards therapy goal(s). See goals on Care Plan in [facility] electronic health record for goal details.  Goals not met.  Barriers to achieving goals:   discharge from facility.    Therapy recommendation(s):    Recommend continued therapies to improve overall strength, balance and mobility.       Elise Lion, PT, DPT  5/23/2024

## 2024-05-23 NOTE — PLAN OF CARE
Goal Outcome Evaluation:  Patient alert and oriented x4. Last blood sugar was 182 at 22:16 - no coverage needed. Lidocaine patches have been removed from his back. Patient is being followed by OT/PT.  Patient is an assist of one for mobility. He has his own red walker in his room. Patient is on Room Air.

## 2024-05-23 NOTE — PLAN OF CARE
PRIMARY DIAGNOSIS: ACUTE PAIN  OUTPATIENT/OBSERVATION GOALS TO BE MET BEFORE DISCHARGE:  1. Pain Status: Improved-controlled with oral pain medications.    2. Return to near baseline physical activity: No    3. Cleared for discharge by consultants (if involved): No    Discharge Planner Nurse   Safe discharge environment identified: No  Barriers to discharge: Yes       Entered by: Josefa Boogie RN 05/23/2024 6:15 AM     Please review provider order for any additional goals.   Nurse to notify provider when observation goals have been met and patient is ready for discharge.Goal Outcome Evaluation:       Pt A&Ox4, c/o lower back pain scheduled tylenol given w/ relief. Pt up w/ Ax1 GB and walker. Pt has L PIV, SL. Pt was up in chair for 1 hour overnight. PT/OT to re-eval.

## 2024-06-28 ENCOUNTER — TRANSFERRED RECORDS (OUTPATIENT)
Dept: HEALTH INFORMATION MANAGEMENT | Facility: CLINIC | Age: 89
End: 2024-06-28
Payer: MEDICARE

## 2024-07-17 ENCOUNTER — TRANSFERRED RECORDS (OUTPATIENT)
Dept: HEALTH INFORMATION MANAGEMENT | Facility: CLINIC | Age: 89
End: 2024-07-17
Payer: MEDICARE

## 2024-08-02 ENCOUNTER — HOSPITAL ENCOUNTER (OUTPATIENT)
Dept: NUCLEAR MEDICINE | Facility: HOSPITAL | Age: 89
Discharge: HOME | End: 2024-08-02
Attending: STUDENT IN AN ORGANIZED HEALTH CARE EDUCATION/TRAINING PROGRAM
Payer: MEDICARE

## 2024-08-02 DIAGNOSIS — M54.50 LUMBAR PAIN: ICD-10-CM

## 2024-08-02 DIAGNOSIS — S32.000A LUMBAR COMPRESSION FRACTURE, CLOSED, INITIAL ENCOUNTER (H): ICD-10-CM

## 2024-08-02 PROCEDURE — A9503 TC99M MEDRONATE: HCPCS | Performed by: STUDENT IN AN ORGANIZED HEALTH CARE EDUCATION/TRAINING PROGRAM

## 2024-08-02 PROCEDURE — 343N000001 HC RX 343: Performed by: STUDENT IN AN ORGANIZED HEALTH CARE EDUCATION/TRAINING PROGRAM

## 2024-08-02 PROCEDURE — 78315 BONE IMAGING 3 PHASE: CPT

## 2024-08-02 RX ORDER — TC 99M MEDRONATE 20 MG/10ML
20-30 INJECTION, POWDER, LYOPHILIZED, FOR SOLUTION INTRAVENOUS ONCE
Status: COMPLETED | OUTPATIENT
Start: 2024-08-02 | End: 2024-08-02

## 2024-08-02 RX ADMIN — TC 99M MEDRONATE 26.2 MILLICURIE: 20 INJECTION, POWDER, LYOPHILIZED, FOR SOLUTION INTRAVENOUS at 10:22

## 2024-08-08 ENCOUNTER — MYC MEDICAL ADVICE (OUTPATIENT)
Dept: INTERVENTIONAL RADIOLOGY/VASCULAR | Facility: CLINIC | Age: 89
End: 2024-08-08
Payer: MEDICARE

## 2024-08-08 ENCOUNTER — TRANSFERRED RECORDS (OUTPATIENT)
Dept: HEALTH INFORMATION MANAGEMENT | Facility: CLINIC | Age: 89
End: 2024-08-08
Payer: MEDICARE

## 2024-08-19 ENCOUNTER — HOSPITAL ENCOUNTER (OUTPATIENT)
Dept: INTERVENTIONAL RADIOLOGY/VASCULAR | Facility: HOSPITAL | Age: 89
Discharge: HOME | End: 2024-08-19
Attending: RADIOLOGY | Admitting: RADIOLOGY
Payer: MEDICARE

## 2024-08-19 VITALS
OXYGEN SATURATION: 96 % | DIASTOLIC BLOOD PRESSURE: 67 MMHG | RESPIRATION RATE: 10 BRPM | SYSTOLIC BLOOD PRESSURE: 142 MMHG | TEMPERATURE: 97.5 F | HEART RATE: 73 BPM

## 2024-08-19 DIAGNOSIS — S32.010A CLOSED COMPRESSION FRACTURE OF L1 VERTEBRA, INITIAL ENCOUNTER (H): ICD-10-CM

## 2024-08-19 DIAGNOSIS — M80.08XS AGE-RELATED OSTEOPOROSIS WITH CURRENT PATHOLOGICAL FRACTURE, VERTEBRA(E), SEQUELA: ICD-10-CM

## 2024-08-19 LAB
ERYTHROCYTE [DISTWIDTH] IN BLOOD BY AUTOMATED COUNT: 15.5 % (ref 10–15)
HCT VFR BLD AUTO: 36.7 % (ref 40–53)
HGB BLD-MCNC: 12 G/DL (ref 13.3–17.7)
INR PPP: 1.18 (ref 0.85–1.15)
MCH RBC QN AUTO: 31.3 PG (ref 26.5–33)
MCHC RBC AUTO-ENTMCNC: 32.7 G/DL (ref 31.5–36.5)
MCV RBC AUTO: 96 FL (ref 78–100)
PLATELET # BLD AUTO: 179 10E3/UL (ref 150–450)
RBC # BLD AUTO: 3.83 10E6/UL (ref 4.4–5.9)
WBC # BLD AUTO: 4.4 10E3/UL (ref 4–11)

## 2024-08-19 PROCEDURE — 85027 COMPLETE CBC AUTOMATED: CPT | Performed by: NURSE PRACTITIONER

## 2024-08-19 PROCEDURE — 250N000011 HC RX IP 250 OP 636: Performed by: NURSE PRACTITIONER

## 2024-08-19 PROCEDURE — 36415 COLL VENOUS BLD VENIPUNCTURE: CPT | Performed by: NURSE PRACTITIONER

## 2024-08-19 PROCEDURE — 99152 MOD SED SAME PHYS/QHP 5/>YRS: CPT

## 2024-08-19 PROCEDURE — C1889 IMPLANT/INSERT DEVICE, NOC: HCPCS

## 2024-08-19 PROCEDURE — 85610 PROTHROMBIN TIME: CPT | Performed by: NURSE PRACTITIONER

## 2024-08-19 PROCEDURE — 272N000507 HC NEEDLE CR7

## 2024-08-19 RX ORDER — FENTANYL CITRATE 50 UG/ML
25-50 INJECTION, SOLUTION INTRAMUSCULAR; INTRAVENOUS EVERY 5 MIN PRN
Status: DISCONTINUED | OUTPATIENT
Start: 2024-08-19 | End: 2024-08-20 | Stop reason: HOSPADM

## 2024-08-19 RX ORDER — NALOXONE HYDROCHLORIDE 0.4 MG/ML
0.4 INJECTION, SOLUTION INTRAMUSCULAR; INTRAVENOUS; SUBCUTANEOUS
Status: DISCONTINUED | OUTPATIENT
Start: 2024-08-19 | End: 2024-08-20 | Stop reason: HOSPADM

## 2024-08-19 RX ORDER — SODIUM CHLORIDE 9 MG/ML
INJECTION, SOLUTION INTRAVENOUS CONTINUOUS
Status: DISCONTINUED | OUTPATIENT
Start: 2024-08-19 | End: 2024-08-20 | Stop reason: HOSPADM

## 2024-08-19 RX ORDER — NALOXONE HYDROCHLORIDE 0.4 MG/ML
0.2 INJECTION, SOLUTION INTRAMUSCULAR; INTRAVENOUS; SUBCUTANEOUS
Status: DISCONTINUED | OUTPATIENT
Start: 2024-08-19 | End: 2024-08-20 | Stop reason: HOSPADM

## 2024-08-19 RX ORDER — CEFAZOLIN SODIUM/WATER 2 G/20 ML
2 SYRINGE (ML) INTRAVENOUS
Status: COMPLETED | OUTPATIENT
Start: 2024-08-19 | End: 2024-08-19

## 2024-08-19 RX ORDER — METHOCARBAMOL 750 MG/1
750 TABLET, FILM COATED ORAL 4 TIMES DAILY PRN
COMMUNITY
Start: 2024-06-21

## 2024-08-19 RX ORDER — ONDANSETRON 2 MG/ML
4 INJECTION INTRAMUSCULAR; INTRAVENOUS
Status: DISCONTINUED | OUTPATIENT
Start: 2024-08-19 | End: 2024-08-20 | Stop reason: HOSPADM

## 2024-08-19 RX ORDER — FLUMAZENIL 0.1 MG/ML
0.2 INJECTION, SOLUTION INTRAVENOUS
Status: DISCONTINUED | OUTPATIENT
Start: 2024-08-19 | End: 2024-08-20 | Stop reason: HOSPADM

## 2024-08-19 RX ADMIN — FENTANYL CITRATE 25 MCG: 50 INJECTION, SOLUTION INTRAMUSCULAR; INTRAVENOUS at 13:56

## 2024-08-19 RX ADMIN — FENTANYL CITRATE 50 MCG: 50 INJECTION, SOLUTION INTRAMUSCULAR; INTRAVENOUS at 13:49

## 2024-08-19 RX ADMIN — MIDAZOLAM HYDROCHLORIDE 1 MG: 1 INJECTION, SOLUTION INTRAMUSCULAR; INTRAVENOUS at 13:53

## 2024-08-19 RX ADMIN — MIDAZOLAM HYDROCHLORIDE 1 MG: 1 INJECTION, SOLUTION INTRAMUSCULAR; INTRAVENOUS at 14:02

## 2024-08-19 RX ADMIN — FENTANYL CITRATE 25 MCG: 50 INJECTION, SOLUTION INTRAMUSCULAR; INTRAVENOUS at 14:08

## 2024-08-19 RX ADMIN — Medication 2 G: at 13:51

## 2024-08-19 NOTE — PRE-PROCEDURE
GENERAL PRE-PROCEDURE:   Procedure:  L1 vertebral augmentation    Written consent obtained?: Yes    Risks and benefits: Risks, benefits and alternatives were discussed    Consent given by:  Patient  Patient states understanding of procedure being performed: Yes    Patient's understanding of procedure matches consent: Yes    Procedure consent matches procedure scheduled: Yes    Expected level of sedation:  Moderate  Appropriately NPO:  Yes  ASA Class:  3  Mallampati  :  Grade 2- soft palate, base of uvula, tonsillar pillars, and portion of posterior pharyngeal wall visible  Lungs:  Lungs clear with good breath sounds bilaterally  Heart:  Normal heart sounds and rate  History & Physical reviewed:  History and physical reviewed and no updates needed  Statement of review:  I have reviewed the lab findings, diagnostic data, medications, and the plan for sedation

## 2024-08-19 NOTE — PROGRESS NOTES
Pre Procedure Note    Reason for procedure: L1 compression fracture     HPI: Familia Sales is a 89 year old male who had a fall in May 2020 with ongoing pain. He was evaluated at [facility] 8/8/2024. Continues to have significant pain and referred for L1 vertebral augmentation. Dr. No reviewed imaging and ok to proceed with L1 compression fracture.       History and physical reviewed and no updates needed.  7/22/2024    Past Medical History:   Diagnosis Date    Afib (H)     Amyloid heart disease (H)     Bilateral low back pain without sciatica     CAD (coronary artery disease)     Diabetes mellitus, type 2 (H)     Hiatal hernia     Hx of heart artery stent 2014    Hyperlipidemia LDL goal <100     Sensorineural hearing loss, bilateral     Sleep apnea      Past Surgical History:   Procedure Laterality Date    APPENDECTOMY  1950    CORONARY STENT PLACEMENT  4/30/2014    EYE SURGERY      FOOT SURGERY      HC DEST LOC LES CHOROID, PHOTOCOAG >=1 SESSION  12/13/06    LASIK    HERNIA REPAIR      HERNIA REPAIR, UMBILICAL  09/19/06    OTHER SURGICAL HISTORY      decompression l3    OTHER SURGICAL HISTORY      spinal fusion L4-L5    RELEASE CARPAL TUNNEL Right 10/29/2014    REPLACEMENT TOTAL KNEE      SURGICAL HISTORY OF -   APRIL 1959    CARTILAGE REMOVED FROM NOSE    SURGICAL HISTORY OF -   08/27/2008    LUMBAR DECOMPRESSION AT L2-3 AND L3-4    SURGICAL HISTORY OF -   1/20/09 THRU 3/06/09    CORTIZONE INJECTIOSN A SERIES OF 8    SURGICAL HISTORY OF -   8/09    INJECTION L3 NERVE ENDING    [facility] ANESTH,LUMBAR SPINE,CORD SURGERY  10/28/97 & 09/21/98    L3 to sacrum with spinal fusion L4-L% and decompression L5-S1    Z ANESTH,TOTAL KNEE ARTHROPLASTY  04/16/03    with revision left 08/02/04    [facility] APPENDECTOMY  MAY 1949 OR 1950    [facility] FOOT/TOES SURGERY PROC UNLISTED  MARCH 1989       Imaging:   EXAM: NM BONE SCAN 3 PHASE  LOCATION: [facility]  DATE: 8/2/2024  IMPRESSION:    Findings  suspicious for acute/subacute L1 superior endplate fracture.    EXAM: CT LUMBAR SPINE W/O CONTRAST  LOCATION: [facility]  DATE: 5/20/2024  IMPRESSION:  1.  Recent appearing mild superior endplate compression fracture at L1 with approximate 20% height loss.  2.  Chronic superior endplate compression fracture at L2.  3.  Postsurgical changes of anterior and posterior fusion at L4-L5.          Medications:  Current Outpatient Medications:     amiodarone (PACERONE) 200 MG tablet, Take 200 mg by mouth daily, Disp: , Rfl:     ELIQUIS ANTICOAGULANT 5 MG tablet, Take 5 mg by mouth 2 times daily, Disp: , Rfl:     fexofenadine (ALLEGRA) 180 MG tablet, Take 180 mg by mouth daily, Disp: , Rfl:     fish oil-omega-3 fatty acids 500 MG capsule, Take 1 capsule by mouth daily, Disp: , Rfl:     methocarbamol (ROBAXIN) 750 MG tablet, Take 750 mg by mouth 4 times daily as needed for other (pain), Disp: , Rfl:     sertraline (ZOLOFT) 50 MG tablet, Take 50 mg by mouth daily , Disp: , Rfl:     VYNDAQEL 20 MG, Take 1 capsule by mouth daily, Disp: , Rfl:     ammonium lactate (LAC-HYDRIN) 12 % external lotion, Apply topically daily as needed for dry skin, Disp: , Rfl:     carboxymethylcellulose (REFRESH PLUS) 0.5 % SOLN ophthalmic solution, Place 1 drop into both eyes daily as needed for dry eyes, Disp: , Rfl:     [employer] GLUCOSAMINE-CHONDROIT-MSM PO, 1 tablet daily, Disp: , Rfl:     diclofenac (VOLTAREN) 1 % topical gel, Apply topically daily as needed, Disp: , Rfl:     ferrous sulfate (FE TABS) 325 (65 Fe) MG EC tablet, Take 325 mg by mouth daily (Patient not taking: Reported on 8/19/2024), Disp: , Rfl:     Flaxseed, Linseed, (FLAX SEED OIL) 1000 MG capsule, Take by mouth daily (Patient not taking: Reported on 8/19/2024), Disp: , Rfl:     hydrocortisone (CORTAID) 1 % external ointment, Apply topically 2 times daily as needed, Disp: , Rfl:     ketoconazole (NIZORAL) 2 % external shampoo, Shampoo scalp 3 times weekly,  Disp: , Rfl:     lidocaine (LIDODERM) 5 % patch, Place 2 patches onto the skin daily as needed for moderate pain To prevent lidocaine toxicity, patient should be patch free for 12 hrs daily., Disp: , Rfl:     nitroGLYcerin (NITROSTAT) 0.4 MG sublingual tablet, Place 0.4 mg under the tongue every 5 minutes as needed for chest pain For chest pain place 1 tablet under the tongue every 5 minutes for 3 doses. If symptoms persist 5 minutes after 1st dose call 911., Disp: , Rfl:     olopatadine (PATANOL) 0.1 % ophthalmic solution, Place 1 drop into both eyes 2 times daily prn, Disp: , Rfl:     oxyCODONE (ROXICODONE) 5 MG tablet, Take 0.5 tablets (2.5 mg) by mouth every 4 hours as needed for moderate to severe pain, Disp: 5 tablet, Rfl: 0    polyethylene glycol (MIRALAX) 17 GM/Dose powder, Take 17 g by mouth daily as needed for constipation, Disp: 116 g, Rfl: 0    Current Facility-Administered Medications:     ceFAZolin Sodium (ANCEF) injection 2 g, 2 g, Intravenous, Pre-Op/Pre-procedure x 1 dose, Mima Sutherland APRN CNP    fentaNYL (PF) (SUBLIMAZE) injection 25-50 mcg, 25-50 mcg, Intravenous, Q5 Min PRN, Mima Sutherland APRN CNP    flumazenil (ROMAZICON) injection 0.2 mg, 0.2 mg, Intravenous, q1 min prn, Mima Sutherland APRN CNP    HOLD: apixaban (ELIQUIS) pre-procedure, , Does not apply, HOLD, Mima Sutherland APRN CNP    lidocaine 1 % 0.1-1 mL, 0.1-1 mL, Other, Q1H PRN, Mima Sutherland APRN CNP    lidocaine 1 % 1-30 mL, 1-30 mL, Intradermal, Once PRN, Mima Sutherland APRN CNP    medication instruction, , Does not apply, Continuous PRN, Mima Sutherland APRN CNP    midazolam (VERSED) injection 0.5-2 mg, 0.5-2 mg, Intravenous, Q4 Min PRN, [place]ienow, Mima S, APRN CNP    naloxone (NARCAN) injection 0.2 mg, 0.2 mg, Intravenous, Q2 Min PRN **OR** naloxone (NARCAN) injection 0.4 mg, 0.4 mg, Intravenous, Q2 Min PRN **OR** naloxone (NARCAN) injection 0.2 mg, 0.2 mg, Intramuscular, Q2 Min PRN **OR** naloxone (NARCAN)  injection 0.4 mg, 0.4 mg, Intramuscular, Q2 Min PRN, Mima Sutherland APRN CNP    ondansetron (ZOFRAN) injection 4 mg, 4 mg, Intravenous, Once PRN, Mima Sutherland APRN CNP    sodium chloride 0.9 % infusion, , Intravenous, Continuous, Mima Sutherland APRN CNP    Allergies:   Allergies   Allergen Reactions    Codeine     Duloxetine     Dye [Contrast Dye]      ONE REACTION TO INJECTED DYE IN SHOULDER, ABOUT MAY 1965.    Morphine     Morphine And Codeine Itching    Motrin [Ibuprofen Micronized] Itching    Tagamet Itching    Vicodin [Hydrocodone-Acetaminophen]      EXTREME SWEATING, FLUSHING AND LIGHT-HEADEDNESS.      Hay Fever & [A.R.M.]        Pre-Procedure Assessment done at 1300.  BP (!) 148/70 (BP Location: Right arm)   Pulse 67   Temp 97.5  F (36.4  C) (Oral)   Resp 16   SpO2 96%     General: resting in bed, daughter in room   Neuro: alert and oriented    Heart: Normal heart sounds and rate  Lungs: Lungs Clear with good breath sounds bilaterally.  Mallampati:  Grade 2:  Soft palate, base of uvula, tonsillar pillars, and portion of posterior pharyngeal wall visible  ASA Class:  Class 3 - SEVERE SYSTEMIC DISEASE, DEFINITE FUNCTIONAL LIMITATIONS.  Abnormal reaction to sedation in the past: No   Expected Level:  Moderate Sedation  PO Intake:  Appropriately NPO for procedure  Sleep apnea: Yes    Labs:  Lab Results   Component Value Date    HGB 12.0 08/19/2024     Lab Results   Component Value Date     08/19/2024     Lab Results   Component Value Date    INR 1.18 08/19/2024       Impression: 89 year old male with presumed osteoporosis who sustained L1 compression fracture in May who continue to have significant, 6/10, back pain. On Eliquis - last dose 8/16. Adequately NPO.     Plan:   Acute/subacute L1 compression fracture   - Lumbar one vertebral augmentation under moderate sedation    - Ancef 2 g IV ordered       Procedural education was reviewed with patient and daughter and family including risks,  benefits, and alternatives. Questions answered and the patient verbalized understanding.     21 minutes spent with patient at bedside.  22 minutes spent in reviewing imaging, test results and in consultation with colleagues.  Total time: 43 minutes     Azul Limon NP, CNP  [phone]

## 2024-08-19 NOTE — PROCEDURES
Neurointerventional Surgery  Post-procedure     Patient Name: Familia Sales  MRN: [id number]  Date of Procedure: August 19, 2024    Procedure: L1 vertebroplasty   Radiologist: Anil Buck MD    Contrast: 0 ml Isovue  Fluoro Time: 5.8 minutes  Air Kerma: 540 mGy    Medications:  Versed 2 mg IV                        Fentanyl 100 mcg IV    Ancef 2 g IV     Sedation Time: 12 minutes    EBL: minimal    Complications: none     Patient reevaluated immediately prior to sedation and prior to procedure.    Preliminary Report:   (See dictation for full detail)  Technically successful L1 vertebroplasty.     Assess/Plan:  Routine post procedure monitoring   Bedrest x 2 hours   Discharge when meets criteria   Report to ordering    Anil Buck MD MD  Emergency pager: [phone]  Office: [phone]

## 2024-08-19 NOTE — SEDATION DOCUMENTATION
Patient Name: Familia Sales  Medical Record Number: [id number]  Today's Date: 8/19/2024    Procedure: L1 vertebralplasty  Proceduralist: Dr. Buck    Procedure Start: 1357  Procedure end: 1409  Sedation medications administered: 2 mg midazolam and 100 mcg fentanyl   Sedation time: 12 minutes    Other Notes: Pt arrived to IR room 1 from  Pre/post [facility] . Consent reviewed. Pt denies any questions or concerns regarding procedure. Pt positioned Prone and monitored per protocol. Pt tolerated procedure without any noted complications. VSS on Transfer. Pt transferred back to  Pre/post bay 7 .

## 2024-08-19 NOTE — DISCHARGE INSTRUCTIONS
1. You are required to have someone accompany you home.    2. Rest today. Do not drive or operate machinery today. Over-activity may produce nausea and dizziness.    3. You should follow your normal diet. Drink plenty of fluids. No alcoholic beverages for 24 hours. *(Alcohol may interact with the medications you received today)    4. Monitor your puncture site for signs of infection, increase pain, redness, swelling or any drainage.     5. Resume normal activity without restrictions the day after the procedure.    6. You may shower in _____ days. Do not soak in a tub or swim for one week after your procedure, Keep your back clean and dry.    7. [facility] will call to check on you in one week.    8. Call the [facility] office with an increase in back pain, fever, chills, or any questions.   [facility]   [phone]

## 2024-08-19 NOTE — PROGRESS NOTES
Pt tolerated procedure well. Denies pain at time of discharge, VSS. Pt and his daughter verbalize understanding of discharge instructions.  Pt brought to front entrance via wheelchair accompanied by his daughter as his .

## 2024-09-11 ENCOUNTER — TRANSFERRED RECORDS (OUTPATIENT)
Dept: HEALTH INFORMATION MANAGEMENT | Facility: CLINIC | Age: 89
End: 2024-09-11
Payer: MEDICARE

## 2024-10-11 ENCOUNTER — HOSPITAL ENCOUNTER (OUTPATIENT)
Dept: NUCLEAR MEDICINE | Facility: CLINIC | Age: 89
Setting detail: NUCLEAR MEDICINE
Discharge: HOME | End: 2024-10-11
Attending: STUDENT IN AN ORGANIZED HEALTH CARE EDUCATION/TRAINING PROGRAM
Payer: MEDICARE

## 2024-10-11 DIAGNOSIS — M54.6 PAIN IN THORACIC SPINE: ICD-10-CM

## 2024-10-11 PROCEDURE — 343N000001 HC RX 343 MED OP 636: Performed by: STUDENT IN AN ORGANIZED HEALTH CARE EDUCATION/TRAINING PROGRAM

## 2024-10-11 PROCEDURE — A9561 TC99M OXIDRONATE: HCPCS | Performed by: STUDENT IN AN ORGANIZED HEALTH CARE EDUCATION/TRAINING PROGRAM

## 2024-10-11 PROCEDURE — 78315 BONE IMAGING 3 PHASE: CPT

## 2024-10-11 RX ADMIN — Medication 25.6 MILLICURIE: at 07:50

## 2024-10-13 ENCOUNTER — APPOINTMENT (OUTPATIENT)
Dept: CT IMAGING | Facility: HOSPITAL | Age: 89
End: 2024-10-13
Attending: EMERGENCY MEDICINE
Payer: MEDICARE

## 2024-10-13 ENCOUNTER — HOSPITAL ENCOUNTER (EMERGENCY)
Facility: HOSPITAL | Age: 89
Discharge: HOME | End: 2024-10-13
Attending: EMERGENCY MEDICINE | Admitting: EMERGENCY MEDICINE
Payer: MEDICARE

## 2024-10-13 ENCOUNTER — APPOINTMENT (OUTPATIENT)
Dept: RADIOLOGY | Facility: HOSPITAL | Age: 89
End: 2024-10-13
Attending: EMERGENCY MEDICINE
Payer: MEDICARE

## 2024-10-13 VITALS
TEMPERATURE: 97.6 F | SYSTOLIC BLOOD PRESSURE: 145 MMHG | HEART RATE: 83 BPM | OXYGEN SATURATION: 92 % | BODY MASS INDEX: 25.84 KG/M2 | RESPIRATION RATE: 21 BRPM | DIASTOLIC BLOOD PRESSURE: 69 MMHG | WEIGHT: 175 LBS
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
## 2024-10-13 DIAGNOSIS — M50.30 DEGENERATIVE DISC DISEASE, CERVICAL: ICD-10-CM

## 2024-10-13 DIAGNOSIS — Z79.01 ANTICOAGULATED BY ANTICOAGULATION TREATMENT: ICD-10-CM

## 2024-10-13 DIAGNOSIS — K59.00 CONSTIPATION, UNSPECIFIED CONSTIPATION TYPE: ICD-10-CM

## 2024-10-13 DIAGNOSIS — Z87.81 HISTORY OF COMPRESSION FRACTURE OF SPINE: ICD-10-CM
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
## 2024-10-13 DIAGNOSIS — M25.552 HIP PAIN, LEFT: ICD-10-CM

## 2024-10-13 LAB
ANION GAP SERPL CALCULATED.3IONS-SCNC: 9 MMOL/L (ref 7–15)
BASOPHILS # BLD AUTO: 0 10E3/UL (ref 0–0.2)
BASOPHILS NFR BLD AUTO: 0 %
BUN SERPL-MCNC: 25.9 MG/DL (ref 8–23)
CALCIUM SERPL-MCNC: 10.4 MG/DL (ref 8.8–10.4)
CHLORIDE SERPL-SCNC: 103 MMOL/L (ref 98–107)
CREAT SERPL-MCNC: 0.85 MG/DL (ref 0.67–1.17)
EGFRCR SERPLBLD CKD-EPI 2021: 81 ML/MIN/1.73M2
EOSINOPHIL # BLD AUTO: 0.1 10E3/UL (ref 0–0.7)
EOSINOPHIL NFR BLD AUTO: 2 %
ERYTHROCYTE [DISTWIDTH] IN BLOOD BY AUTOMATED COUNT: 14.8 % (ref 10–15)
GLUCOSE SERPL-MCNC: 139 MG/DL (ref 70–99)
HCO3 SERPL-SCNC: 26 MMOL/L (ref 22–29)
HCT VFR BLD AUTO: 39.6 % (ref 40–53)
HGB BLD-MCNC: 12.9 G/DL (ref 13.3–17.7)
HOLD SPECIMEN: NORMAL
IMM GRANULOCYTES # BLD: 0 10E3/UL
IMM GRANULOCYTES NFR BLD: 0 %
LYMPHOCYTES # BLD AUTO: 1.4 10E3/UL (ref 0.8–5.3)
LYMPHOCYTES NFR BLD AUTO: 26 %
MAGNESIUM SERPL-MCNC: 2.2 MG/DL (ref 1.7–2.3)
MCH RBC QN AUTO: 31.3 PG (ref 26.5–33)
MCHC RBC AUTO-ENTMCNC: 32.6 G/DL (ref 31.5–36.5)
MCV RBC AUTO: 96 FL (ref 78–100)
MONOCYTES # BLD AUTO: 0.5 10E3/UL (ref 0–1.3)
MONOCYTES NFR BLD AUTO: 10 %
NEUTROPHILS # BLD AUTO: 3.2 10E3/UL (ref 1.6–8.3)
NEUTROPHILS NFR BLD AUTO: 61 %
NRBC # BLD AUTO: 0 10E3/UL
NRBC BLD AUTO-RTO: 0 /100
PLATELET # BLD AUTO: 205 10E3/UL (ref 150–450)
POTASSIUM SERPL-SCNC: 4.2 MMOL/L (ref 3.4–5.3)
RBC # BLD AUTO: 4.12 10E6/UL (ref 4.4–5.9)
SODIUM SERPL-SCNC: 138 MMOL/L (ref 135–145)
TROPONIN T SERPL HS-MCNC: 47 NG/L
TROPONIN T SERPL HS-MCNC: 49 NG/L
WBC # BLD AUTO: 5.3 10E3/UL (ref 4–11)

## 2024-10-13 PROCEDURE — 36415 COLL VENOUS BLD VENIPUNCTURE: CPT | Performed by: EMERGENCY MEDICINE

## 2024-10-13 PROCEDURE — 96374 THER/PROPH/DIAG INJ IV PUSH: CPT | Mod: 59

## 2024-10-13 PROCEDURE — 80048 BASIC METABOLIC PNL TOTAL CA: CPT | Performed by: EMERGENCY MEDICINE

## 2024-10-13 PROCEDURE — 70450 CT HEAD/BRAIN W/O DYE: CPT | Mod: MA

## 2024-10-13 PROCEDURE — 999N000104 CT LUMBAR SPINE RECONSTRUCTED

## 2024-10-13 PROCEDURE — 72125 CT NECK SPINE W/O DYE: CPT | Mod: MA

## 2024-10-13 PROCEDURE — 99285 EMERGENCY DEPT VISIT HI MDM: CPT | Mod: 25

## 2024-10-13 PROCEDURE — 250N000013 HC RX MED GY IP 250 OP 250 PS 637: Performed by: EMERGENCY MEDICINE

## 2024-10-13 PROCEDURE — 84484 ASSAY OF TROPONIN QUANT: CPT | Performed by: EMERGENCY MEDICINE

## 2024-10-13 PROCEDURE — 250N000011 HC RX IP 250 OP 636: Performed by: EMERGENCY MEDICINE

## 2024-10-13 PROCEDURE — 74177 CT ABD & PELVIS W/CONTRAST: CPT | Mod: MA

## 2024-10-13 PROCEDURE — 73070 X-RAY EXAM OF ELBOW: CPT | Mod: RT

## 2024-10-13 PROCEDURE — 83735 ASSAY OF MAGNESIUM: CPT | Performed by: EMERGENCY MEDICINE

## 2024-10-13 PROCEDURE — 85004 AUTOMATED DIFF WBC COUNT: CPT | Performed by: EMERGENCY MEDICINE

## 2024-10-13 PROCEDURE — 93005 ELECTROCARDIOGRAM TRACING: CPT | Performed by: EMERGENCY MEDICINE

## 2024-10-13 RX ORDER — HYDROMORPHONE HCL IN WATER/PF 6 MG/30 ML
0.5 PATIENT CONTROLLED ANALGESIA SYRINGE INTRAVENOUS ONCE
Status: COMPLETED | OUTPATIENT
Start: 2024-10-13 | End: 2024-10-13

## 2024-10-13 RX ORDER — LIDOCAINE 4 G/G
1 PATCH TOPICAL ONCE
Status: DISCONTINUED | OUTPATIENT
Start: 2024-10-13 | End: 2024-10-13 | Stop reason: HOSPADM

## 2024-10-13 RX ORDER — DOCUSATE SODIUM 100 MG/1
100 CAPSULE, LIQUID FILLED ORAL 2 TIMES DAILY
Qty: 20 CAPSULE | Refills: 0 | Status: SHIPPED | OUTPATIENT
Start: 2024-10-13 | End: 2024-10-23

## 2024-10-13 RX ORDER — IOPAMIDOL 755 MG/ML
80 INJECTION, SOLUTION INTRAVASCULAR ONCE
Status: COMPLETED | OUTPATIENT
Start: 2024-10-13 | End: 2024-10-13

## 2024-10-13 RX ADMIN — IOPAMIDOL 80 ML: 755 INJECTION, SOLUTION INTRAVENOUS at 10:05

## 2024-10-13 RX ADMIN — LIDOCAINE 1 PATCH: 4 PATCH TOPICAL at 13:32

## 2024-10-13 RX ADMIN — HYDROMORPHONE HYDROCHLORIDE 0.5 MG: 0.2 INJECTION, SOLUTION INTRAMUSCULAR; INTRAVENOUS; SUBCUTANEOUS at 09:58

## 2024-10-13 ASSESSMENT — ACTIVITIES OF DAILY LIVING (ADL)
ADLS_ACUITY_SCORE: 38

## 2024-10-13 NOTE — ED NOTES
Walked to bathroom on side 3 with walker,patient said how he walks at home just a little slower and sore

## 2024-10-13 NOTE — ED NOTES
Bed: [place] [address]  Expected date:   Expected time:   Means of arrival: Ambulance  Comments:  [facility]: Fall, hip pain, on thinners

## 2024-10-13 NOTE — ED TRIAGE NOTES
Pt here via EMS from home after a fall at about 1700 last night. Fell after standing from chair; felt dizzy and couldn't catch himself on his walker. Said he hit his head but no LOC. On thinners. Trauma alert called.   Pt endorses left hip pain and tenderness and tingling down left leg      Triage Assessment (Adult)       Row Name 10/13/24 0914          Triage Assessment    Airway WDL WDL        Respiratory WDL    Respiratory WDL WDL        Skin Circulation/Temperature WDL    Skin Circulation/Temperature WDL WDL        Cardiac WDL    Cardiac WDL WDL        Cognitive/Neuro/Behavioral WDL    Cognitive/Neuro/Behavioral WDL WDL

## 2024-10-13 NOTE — ED PROVIDER NOTES
EMERGENCY DEPARTMENT ENCOUNTER      NAME: Familia Sales  AGE: 89 year old male  YOB: 1931  MRN: [id number]  EVALUATION DATE & TIME: 10/13/2024  9:11 AM    PCP: Josefa Saleh    ED PROVIDER: Brenda Pozo M.D.      CHIEF COMPLAINT     Chief Complaint   Patient presents with    Fall    Hip Pain         FINAL IMPRESSION:     1. Fall at home, initial encounter    2. Hip pain, left    3. Constipation, unspecified constipation type    4. Degenerative disc disease, cervical    5. History of compression fracture of spine    6. Anticoagulated by anticoagulation treatment          MEDICAL DECISION MAKING:     ED Course as of 10/13/24 1702   Sun Oct 13, 2024   0917 89-year-old male presents via EMS after a fall.  He said he was walking with his walker felt dizzy and landed on his buttocks and backwards hit his head.  He is anticoagulated.  Denies losing consciousness.   0917 He was able to get up on his own.  But has noted left hip pain.   0917 He has a history of chronic pain takes oxycodone and Dilaudid.  When I ask him specifically where is the pain that he takes his medications for his age for his vertebra.   0917 he otherwise reports that has been doing well no recent illnesses.   0917 On exam he is pale cooperative and pleasant no signs of head injury.  Bilateral hearing aids.   0918 Surgical scars lumbar spine left hip left knee.  Has full range of motion of the left hip.  Pain is located in the buttocks.  There is no hematoma or ecchymosis.  GCS of 15.  Surgical scar   0921 Differential diagnosis include but not limited to arrhythmia near syncope anemia sepsis trauma among others.   0921 Trauma alert called.  Will image head spine lumbar spine and hip.   0921 IV established patient placed on postoperative blood pressure monitors.   0921 EKG reviewed by me reveals provide complex sinus rhythm block inverted T waves in V1 V2 left axis deviation Q waves inferiorly   1033 Labs Reveals what dose of 5.3  hemoglobin 12.9 normal platelets.  Normal renal function of platelets.  Slightly elevated troponin of 47 normal magnesium.   1158 Updated patient and daughter regarding findings.   1241 Troponin 47 and 49 few months ago was in the 50s patient denies any chest pain.   1241 Daughter at  bedside.  Concerned about pain control at home patient is already on Dilaudid oxycodone and Tylenol.   1654 Patient able to ambulate as usual.  Has home care and daughter lives at home  Both feel comfortable with discharge  Patient wondering about cause of left sided abdominal pain. No trauma but large amount to stool could be a contributor  Already on miralax   Will add colace    Clinical impression and decision making  89 yo male fell trying to stand up  No chest pain  Trauma evaluate negative for acute  Multiple other chronic findings   Able to ambulate. Has pain medications at home  Recommended re evaluation by PCP this week and return for any concerns    I considered admission patient able to ambulated  No chest pain  reliable           Medical Decision Making    History:  Supplemental history from: EMS sister  External Record(s) reviewed: Other: [facility], 8/19/2024, L1 vertebroplasty. Diabetes, anemia, CAD, A-fib, is taking Eliquis    Work Up:  Chart documentation includes differential considered and any EKGs or imaging independently interpreted by provider, where specified.  In additional to work up documented, I considered the following work up: UA patient denies urinary symptoms    External consultation:  Discussion of management with another provider: Documented in chart, if applicable    Complicating factors:  Care impacted by chronic illness: Other: Diabetes, anemia, CAD, A-fib, is taking Eliquis      Disposition considerations: Discharge. I prescribed additional prescription strength medication(s) as charted. See documentation for any additional details.    Adult Minor Head Trauma:Age 65 years or older and  anticoagulated        ED COURSE   9:07 AM I met with the patient, obtained history, performed an initial exam, and discussed options and plan for diagnostics and treatment here in the ED.  10:34 AM Rechecked and updated patient on lab results. Discussed further plan of care.  10:41 AM Per daughter, patient had a vertebroplasty and he is scheduled to have another one.   1:21 PM Patient will be discharged home with prescription medications.    At the conclusion of the encounter I discussed the results of all of the tests and the disposition. The questions were answered. The patient and his daughter acknowledged understanding and was agreeable with the care plan.         MEDICATIONS GIVEN IN THE EMERGENCY:     Medications   HYDROmorphone (DILAUDID) injection 0.5 mg (0.5 mg Intravenous $Given 10/13/24 3395)   iopamidol (ISOVUE-370) solution 80 mL (80 mLs Intravenous $Given 10/13/24 1005)       NEW PRESCRIPTIONS STARTED AT TODAY'S ER VISIT     Discharge Medication List as of 10/13/2024  1:33 PM        START taking these medications    Details   docusate sodium (COLACE) 100 MG capsule Take 1 capsule (100 mg) by mouth 2 times daily for 10 days., Disp-20 capsule, R-0, Local Print                =================================================================    HPI     Patient information was obtained from: EMS, patient, daughter    Use of : N/A        Familia Sales is a 89 year old male who presents by ambulance for evaluation of fall and left hip pain.    Per EMS, patient fell last night and doesn't recall the exact time he fell. He developed left hip pain at ~1700 at home. States he was trying to stand after he was sitting, got up and held onto his walker, and fell while standing. He denied hitting his head. He is on a blood thinner, but doesn't indicate which one. He is living in an independent home with his daughter. EMS states patient is quite mobile and sometimes uses a walker. He has a history of  "spontaneous back fractures per EMS. He took Hydromorphone and oxycodone and last took Dilaudid at 0800 this morning. He had left hip pain initially when he fell yesterday but the pain worsened today which prompted him to call the ambulance. Patient described the pain as \"unbearable\"'. Patient has a history of a hip replacement.    Patient reports he didn't notice any of his hips looked different after he fell. Patient got up off the floor by himself and notes it was difficult to do that. He notes he felt really dizzy prior to the fall. He tried to grab onto his walker, which helped, but then he eventually fell with the walker. Patient believes he may have landed on his left hip but isn't sure. He did fall back and hit his head after he fell. He has pain in his left buttocks and posterior left hip. States his teeth and nose feel fine. Mentions he has a stimulator in his hip that is \"dead\". He denies diarrhea, bloody stool, black stools, cough, or chest pain prior or after the fall. He reports LLQ abdominal pain as well. He takes pain medications because he had \"3 vertebrae compression fractures\". His current left hip pain (anterior) feels \"different\" than hip pain he felt in the past. When patient was asked what the current year is, he says \"2024\". When asked what the current month is, he reports \"October\".    Per daughter, patient had a vertebroplasty and he is scheduled to have another one. Patient felt dizzy and when asked what he meant by \"dizzy\", he reports he felt lightheaded and unsteady.      REVIEW OF SYSTEMS   Review of Systems - Refer to the HPI, otherwise negative    PAST MEDICAL HISTORY:     Past Medical History:   Diagnosis Date    Afib (H)     Amyloid heart disease (H)     Bilateral low back pain without sciatica     CAD (coronary artery disease)     Diabetes mellitus, type 2 (H)     Hiatal hernia     Hx of heart artery stent 2014    Hyperlipidemia LDL goal <100     Sensorineural hearing loss, " bilateral     Sleep apnea        PAST SURGICAL HISTORY:     Past Surgical History:   Procedure Laterality Date    APPENDECTOMY  1950    CORONARY STENT PLACEMENT  4/30/2014    EYE SURGERY      FOOT SURGERY      HC DEST LOC LES CHOROID, PHOTOCOAG >=1 SESSION  12/13/06    LASIK    HERNIA REPAIR      HERNIA REPAIR, UMBILICAL  09/19/06    IR LUMBAR VERTEBROPLASTY  8/19/2024    OTHER SURGICAL HISTORY      decompression l3    OTHER SURGICAL HISTORY      spinal fusion L4-L5    RELEASE CARPAL TUNNEL Right 10/29/2014    REPLACEMENT TOTAL KNEE      SURGICAL HISTORY OF -   APRIL 1959    CARTILAGE REMOVED FROM NOSE    SURGICAL HISTORY OF -   08/27/2008    LUMBAR DECOMPRESSION AT L2-3 AND L3-4    SURGICAL HISTORY OF -   1/20/09 THRU 3/06/09    CORTIZONE INJECTIOSN A SERIES OF 8    SURGICAL HISTORY OF -   8/09    INJECTION L3 NERVE ENDING    [facility] ANESTH,LUMBAR SPINE,CORD SURGERY  10/28/97 & 09/21/98    L3 to sacrum with spinal fusion L4-L% and decompression L5-S1    Z ANESTH,TOTAL KNEE ARTHROPLASTY  04/16/03    with revision left 08/02/04    [facility] APPENDECTOMY  MAY 1949 OR 1950    [facility] FOOT/TOES SURGERY PROC UNLISTED  MARCH 1989         CURRENT MEDICATIONS:   amiodarone (PACERONE) 200 MG tablet  ammonium lactate (LAC-HYDRIN) 12 % external lotion  carboxymethylcellulose (REFRESH PLUS) 0.5 % SOLN ophthalmic solution  [facility] GLUCOSAMINE-CHONDROIT-MSM PO  diclofenac (VOLTAREN) 1 % topical gel  docusate sodium (COLACE) 100 MG capsule  ELIQUIS ANTICOAGULANT 5 MG tablet  ferrous sulfate (FE TABS) 325 (65 Fe) MG EC tablet  fexofenadine (ALLEGRA) 180 MG tablet  fish oil-omega-3 fatty acids 500 MG capsule  Flaxseed, Linseed, (FLAX SEED OIL) 1000 MG capsule  hydrocortisone (CORTAID) 1 % external ointment  ketoconazole (NIZORAL) 2 % external shampoo  lidocaine (LIDODERM) 5 % patch  methocarbamol (ROBAXIN) 750 MG tablet  nitroGLYcerin (NITROSTAT) 0.4 MG sublingual tablet  olopatadine (PATANOL) 0.1 % ophthalmic solution  oxyCODONE (ROXICODONE) 5 MG  tablet  polyethylene glycol (MIRALAX) 17 GM/Dose powder  sertraline (ZOLOFT) 50 MG tablet  VYNDAQEL 20 MG         ALLERGIES:     Allergies   Allergen Reactions    Codeine     Duloxetine     Dye [Contrast Dye]      10/13/2024-- Patient had CT contrast without premedication and did fine. Patient also states that he had contrast before and was fine too. - BY([facility] CT)    ONE REACTION TO INJECTED DYE IN SHOULDER, ABOUT MAY 1965.      Morphine     Morphine And Codeine Itching    Motrin [Ibuprofen Micronized] Itching    Tagamet Itching    Vicodin [Hydrocodone-Acetaminophen]      EXTREME SWEATING, FLUSHING AND LIGHT-HEADEDNESS.      Hay Fever & [A.R.M.]        FAMILY HISTORY:     Family History   Problem Relation Age of Onset    Gastrointestinal Disease Mother         ulcers    Cancer Mother     Cancer Maternal Grandfather     Depression Daughter     Liver Disease Daughter     Diabetes Daughter     Cancer Father        SOCIAL HISTORY:     Social History     Socioeconomic History    Marital status:    Tobacco Use    Smoking status: Former     Current packs/day: 0.00     Average packs/day: 1 pack/day for 24.0 years (24.0 ttl pk-yrs)     Types: Cigarettes     Start date: 1940     Quit date: 1964     Years since quittin.8    Smokeless tobacco: Never   Vaping Use    Vaping status: Never Used   Substance and Sexual Activity    Alcohol use: Yes     Comment: very rare    Drug use: No    Sexual activity: Not Currently     Partners: Female       VITALS:   BP (!) 145/69   Pulse 83   Temp 97.6  F (36.4  C) (Oral)   Resp 21   Wt 79.4 kg (175 lb)   SpO2 92%   BMI 25.84 kg/m      PHYSICAL EXAM     Physical Exam  Vitals and nursing note reviewed. Exam conducted with a chaperone present.   Constitutional:       Appearance: Normal appearance. He is not ill-appearing or diaphoretic.   Abdominal:          Comments: Location of pain no guarding or rebound   Neurological:      Mental Status: He is alert.         Physical  Exam   Constitutional: Pale. Cooperative and pleasant. No signs of head injury.    Head: Atraumatic.     Nose: Nose normal.     Mouth/Throat: Oropharynx is clear and moist.     Eyes: EOM are normal. Pupils are equal, round, and reactive to light. Wearing glasses. No hyphema.    Ears: Bilateral pearly white tympanic membranes. No hemotympanum. Wearing hearing aids.    Neck: Normal range of motion. Neck supple.     Cardiovascular: Normal rate, regular rhythm and normal heart sounds.  2+ femoral pulses/radial/DP pulses B    Pulmonary/Chest: Normal effort  and breath sounds normal.     Abdominal: soft nontender. Surgical scar in the LLQ.    Musculoskeletal: Normal range of motion.    Neurological: Moves upper and lower extremities equally. GCS of 15.    Lymphatics: no edema, no calves pain, no palpable cords.    : with chaperon normal male anatomy     Skin: Skin is warm and dry. Redness on right elbow with pain. Surgical scar on his lumbar back area. Surgical scar on the left knee. Surgical scar on the left hip.    Psychiatric: Normal mood and affect. Behavior is normal.       LAB:     All pertinent labs reviewed and interpreted.  Labs Ordered and Resulted from Time of ED Arrival to Time of ED Departure   BASIC METABOLIC PANEL - Abnormal       Result Value    Sodium 138      Potassium 4.2      Chloride 103      Carbon Dioxide (CO2) 26      Anion Gap 9      Urea Nitrogen 25.9 (*)     Creatinine 0.85      GFR Estimate 81      Calcium 10.4      Glucose 139 (*)    TROPONIN T, HIGH SENSITIVITY - Abnormal    Troponin T, High Sensitivity 47 (*)    CBC WITH PLATELETS AND DIFFERENTIAL - Abnormal    WBC Count 5.3      RBC Count 4.12 (*)     Hemoglobin 12.9 (*)     Hematocrit 39.6 (*)     MCV 96      MCH 31.3      MCHC 32.6      RDW 14.8      Platelet Count 205      % Neutrophils 61      % Lymphocytes 26      % Monocytes 10      % Eosinophils 2      % Basophils 0      % Immature Granulocytes 0      NRBCs per 100 WBC 0       Absolute Neutrophils 3.2      Absolute Lymphocytes 1.4      Absolute Monocytes 0.5      Absolute Eosinophils 0.1      Absolute Basophils 0.0      Absolute Immature Granulocytes 0.0      Absolute NRBCs 0.0     TROPONIN T, HIGH SENSITIVITY - Abnormal    Troponin T, High Sensitivity 49 (*)    MAGNESIUM - Normal    Magnesium 2.2          RADIOLOGY:     Reviewed all pertinent imaging. Please see official radiology report.  XR Elbow Right 2 Views   Final Result   IMPRESSION: Anatomic alignment right elbow. No acute displaced right elbow fracture. Mild right elbow osteoarthritis with chondrocalcinosis. Enthesopathic change at the medial and lateral humeral condyles. No sizable elbow joint effusion. Diffuse bone    demineralization.         CT Lumbar Spine Reconstructed   Final Result   IMPRESSION:   1.  Diffuse osseous demineralization. No definite new/acute lumbar spine fracture.   2.  Redemonstrated subacute-appearing T12 compression fracture with moderate vertebral height loss.   3.  Redemonstrated L1 compression deformity status post vertebroplasty.   4.  Unchanged mild L2 vertebral height loss.   5.  Stable changes status post L4-L5 fusion.   6.  Multilevel degenerative changes, as described.            CT Abdomen Pelvis w Contrast   Final Result   IMPRESSION:    1.  No acute traumatic finding in the abdomen or pelvis.   2.  Large hiatal hernia.   3.  Large amount of retained stool in the colon.   4.  Refer to separate lumbar spine CT report.         CT Cervical Spine w/o Contrast   Final Result   IMPRESSION:   1.  Diffuse osseous demineralization. No definite acute cervical spine fracture or posttraumatic malalignment.   2.  Multilevel degenerative changes, as described.   3.  Moderate to severe spinal canal stenosis at C3-C4.      Head CT w/o contrast   Final Result   IMPRESSION:   1.  No CT evidence for acute intracranial process.   2.  Brain atrophy and presumed chronic microvascular ischemic changes as above.                  EKG:       I have independently reviewed and interpreted the EKG(s) documented above.      PROCEDURES:     Procedures      I, Karen Landeros, am serving as a scribe to document services personally performed by Dr. Pozo based on my observation and the provider's statements to me. I, Brenda Pozo MD attest that Karen Landeros is acting in a scribe capacity, has observed my performance of the services and has documented them in accordance with my direction.    Brenda Pozo M.D.  Emergency Medicine  [facility] EMERGENCY DEPARTMENT  [address] [place] [id number]  [phone]  Dept: [phone]       Brenad Pozo MD  10/13/24 1702       Brenda Pozo MD  10/13/24 9827

## 2024-10-14 LAB
ATRIAL RATE - MUSE: 76 BPM
DIASTOLIC BLOOD PRESSURE - MUSE: 80 MMHG
INTERPRETATION ECG - MUSE: NORMAL
P AXIS - MUSE: 76 DEGREES
PR INTERVAL - MUSE: 254 MS
QRS DURATION - MUSE: 148 MS
QT - MUSE: 454 MS
QTC - MUSE: 510 MS
R AXIS - MUSE: -87 DEGREES
SYSTOLIC BLOOD PRESSURE - MUSE: 151 MMHG
T AXIS - MUSE: 73 DEGREES
VENTRICULAR RATE- MUSE: 76 BPM

## 2024-10-18 DIAGNOSIS — S22.080G COMPRESSION FRACTURE OF T12 VERTEBRA WITH DELAYED HEALING, SUBSEQUENT ENCOUNTER: Primary | ICD-10-CM

## 2024-10-19 ENCOUNTER — HEALTH MAINTENANCE LETTER (OUTPATIENT)
Age: 89
End: 2024-10-19

## 2024-10-24 ENCOUNTER — TELEPHONE (OUTPATIENT)
Dept: INTERVENTIONAL RADIOLOGY/VASCULAR | Facility: HOSPITAL | Age: 89
End: 2024-10-24
Payer: MEDICARE

## 2024-11-01 ENCOUNTER — TRANSCRIBE ORDERS (OUTPATIENT)
Dept: OTHER | Age: 89
End: 2024-11-01

## 2024-11-01 DIAGNOSIS — M54.50 LOW BACK PAIN: Primary | ICD-10-CM

## 2024-11-05 ENCOUNTER — HOSPITAL ENCOUNTER (OUTPATIENT)
Dept: INTERVENTIONAL RADIOLOGY/VASCULAR | Facility: HOSPITAL | Age: 89
Discharge: HOME | End: 2024-11-05
Attending: NURSE PRACTITIONER
Payer: MEDICARE

## 2024-11-05 VITALS
TEMPERATURE: 97.8 F | DIASTOLIC BLOOD PRESSURE: 82 MMHG | HEART RATE: 76 BPM | OXYGEN SATURATION: 99 % | SYSTOLIC BLOOD PRESSURE: 152 MMHG | RESPIRATION RATE: 16 BRPM

## 2024-11-05 DIAGNOSIS — M80.00XG AGE-RELATED OSTEOPOROSIS WITH CURRENT PATHOLOGICAL FRACTURE WITH DELAYED HEALING, SUBSEQUENT ENCOUNTER: Primary | ICD-10-CM

## 2024-11-05 PROCEDURE — 250N000011 HC RX IP 250 OP 636: Performed by: RADIOLOGY

## 2024-11-05 PROCEDURE — 99152 MOD SED SAME PHYS/QHP 5/>YRS: CPT

## 2024-11-05 RX ORDER — FLUMAZENIL 0.1 MG/ML
0.2 INJECTION, SOLUTION INTRAVENOUS
Status: DISCONTINUED | OUTPATIENT
Start: 2024-11-05 | End: 2024-11-06 | Stop reason: HOSPADM

## 2024-11-05 RX ORDER — NALOXONE HYDROCHLORIDE 0.4 MG/ML
0.2 INJECTION, SOLUTION INTRAMUSCULAR; INTRAVENOUS; SUBCUTANEOUS
Status: DISCONTINUED | OUTPATIENT
Start: 2024-11-05 | End: 2024-11-06 | Stop reason: HOSPADM

## 2024-11-05 RX ORDER — ONDANSETRON 2 MG/ML
4 INJECTION INTRAMUSCULAR; INTRAVENOUS
Status: DISCONTINUED | OUTPATIENT
Start: 2024-11-05 | End: 2024-11-06 | Stop reason: HOSPADM

## 2024-11-05 RX ORDER — ACETAMINOPHEN 325 MG/1
325-650 TABLET ORAL EVERY 4 HOURS PRN
Status: DISCONTINUED | OUTPATIENT
Start: 2024-11-05 | End: 2024-11-06 | Stop reason: HOSPADM

## 2024-11-05 RX ORDER — NALOXONE HYDROCHLORIDE 0.4 MG/ML
0.4 INJECTION, SOLUTION INTRAMUSCULAR; INTRAVENOUS; SUBCUTANEOUS
Status: DISCONTINUED | OUTPATIENT
Start: 2024-11-05 | End: 2024-11-06 | Stop reason: HOSPADM

## 2024-11-05 RX ORDER — FENTANYL CITRATE 50 UG/ML
25-50 INJECTION, SOLUTION INTRAMUSCULAR; INTRAVENOUS EVERY 5 MIN PRN
Status: DISCONTINUED | OUTPATIENT
Start: 2024-11-05 | End: 2024-11-06 | Stop reason: HOSPADM

## 2024-11-05 RX ADMIN — MIDAZOLAM HYDROCHLORIDE 2 MG: 1 INJECTION, SOLUTION INTRAMUSCULAR; INTRAVENOUS at 15:55

## 2024-11-05 RX ADMIN — FENTANYL CITRATE 50 MCG: 50 INJECTION, SOLUTION INTRAMUSCULAR; INTRAVENOUS at 15:57

## 2024-11-05 RX ADMIN — FENTANYL CITRATE 50 MCG: 50 INJECTION, SOLUTION INTRAMUSCULAR; INTRAVENOUS at 16:01

## 2024-11-05 NOTE — IR NOTE
Patient Name: Familia Sales  Medical Record Number: [id number]  Today's Date: 11/5/2024    Procedure: Verterboplasy  Proceduralist: Marie      Sedation medications administered: 2 mg midazolam and 100 mcg fentanyl   Sedation time: 15 minutes

## 2024-11-05 NOTE — PROCEDURES
Neurointerventional Surgery:  Post-procedure note    Patient Name: Familia Sales  MRN: [id number]  Date of Procedure: November 5, 2024    Procedure: T12 vertebroplasty    Radiologist: Jesus Salazar MD    Fluoro Time: 3.4 minutes    Air Kerma: 344 mGy    Medications:   Ancef 2 grams IV  Versed 2 mg IV  Fentanyl 100 mcg IV    Sedation Time:   15 minutes    EBL:   minimal      Complications:   none    Patient reevaluated immediately prior to sedation and prior to procedure.    Preliminary Report:   (See dictation for full detail)  Technically successful T12 vertebroplasty.    Assess/Plan:  Routine post sedation monitoring.  Bedrest x 2 hours post procedure.  Discharge when meets criteria.  -okay to resume Eliquis tomorrow evening    Jesus Salazar MD   Emergency pager: [phone]  Office: [phone]

## 2024-11-05 NOTE — DISCHARGE INSTRUCTIONS
1. You are required to have someone accompany you home.    2. Rest today. Do not drive or operate machinery today. Over-activity may produce nausea and dizziness.    3. You should follow your normal diet. Drink plenty of fluids. No alcoholic beverages for 24 hours. *(Alcohol may interact with the medications you received today)    4. Monitor your puncture site for signs of infection, increase pain, redness, swelling or any drainage.     5. Resume normal activity without restrictions the day after the procedure.    6. You may shower in _____ days. Do not soak in a tub or swim for one week after your procedure, Keep your back clean and dry.    7. [facility] will call to check on you in one week.    8. Call the [facility] office with an increase in back pain, fever, chills, or any questions.   [facility]   [phone]

## 2024-12-22 ENCOUNTER — HEALTH MAINTENANCE LETTER (OUTPATIENT)
Age: 89
End: 2024-12-22

## 2025-01-26 ENCOUNTER — HEALTH MAINTENANCE LETTER (OUTPATIENT)
Age: OVER 89
End: 2025-01-26

## 2025-05-03 ENCOUNTER — HEALTH MAINTENANCE LETTER (OUTPATIENT)
Age: OVER 89
End: 2025-05-03

## 2025-08-16 ENCOUNTER — HEALTH MAINTENANCE LETTER (OUTPATIENT)
Age: OVER 89
End: 2025-08-16

## (undated) RX ORDER — CEFAZOLIN SODIUM/WATER 2 G/20 ML
SYRINGE (ML) INTRAVENOUS
Status: DISPENSED
Start: 2024-08-19

## (undated) RX ORDER — FENTANYL CITRATE 50 UG/ML
INJECTION, SOLUTION INTRAMUSCULAR; INTRAVENOUS
Status: DISPENSED
Start: 2024-08-19

## (undated) RX ORDER — LIDOCAINE HYDROCHLORIDE 10 MG/ML
INJECTION, SOLUTION EPIDURAL; INFILTRATION; INTRACAUDAL; PERINEURAL
Status: DISPENSED
Start: 2024-08-19